# Patient Record
Sex: MALE | Race: WHITE | NOT HISPANIC OR LATINO | Employment: OTHER | URBAN - METROPOLITAN AREA
[De-identification: names, ages, dates, MRNs, and addresses within clinical notes are randomized per-mention and may not be internally consistent; named-entity substitution may affect disease eponyms.]

---

## 2018-03-13 ENCOUNTER — OFFICE VISIT CONVERTED (OUTPATIENT)
Dept: CARDIOLOGY | Facility: CLINIC | Age: 53
End: 2018-03-13
Attending: NURSE PRACTITIONER

## 2018-03-15 ENCOUNTER — OFFICE VISIT CONVERTED (OUTPATIENT)
Dept: SURGERY | Facility: CLINIC | Age: 53
End: 2018-03-15
Attending: UROLOGY

## 2018-10-05 ENCOUNTER — OFFICE VISIT CONVERTED (OUTPATIENT)
Dept: CARDIOLOGY | Facility: CLINIC | Age: 53
End: 2018-10-05
Attending: NURSE PRACTITIONER

## 2018-11-26 ENCOUNTER — OFFICE VISIT CONVERTED (OUTPATIENT)
Dept: SURGERY | Facility: CLINIC | Age: 53
End: 2018-11-26
Attending: UROLOGY

## 2018-12-17 ENCOUNTER — OFFICE VISIT CONVERTED (OUTPATIENT)
Dept: SURGERY | Facility: CLINIC | Age: 53
End: 2018-12-17
Attending: UROLOGY

## 2019-01-29 ENCOUNTER — OFFICE VISIT CONVERTED (OUTPATIENT)
Dept: PULMONOLOGY | Facility: CLINIC | Age: 54
End: 2019-01-29
Attending: INTERNAL MEDICINE

## 2019-03-12 ENCOUNTER — HOSPITAL ENCOUNTER (OUTPATIENT)
Dept: CARDIOLOGY | Facility: HOSPITAL | Age: 54
Discharge: HOME | End: 2019-03-12
Attending: INTERNAL MEDICINE

## 2019-03-27 ENCOUNTER — HOSPITAL ENCOUNTER (OUTPATIENT)
Dept: OTHER | Facility: HOSPITAL | Age: 54
Discharge: HOME | End: 2019-03-27
Attending: INTERNAL MEDICINE

## 2019-03-27 LAB
ALBUMIN SERPL-MCNC: 4.4 G/DL (ref 3.5–5)
ALBUMIN/GLOB SERPL: 1.4 {RATIO} (ref 1.4–2.6)
ALP SERPL-CCNC: 88 U/L (ref 56–119)
ALT SERPL-CCNC: 13 U/L (ref 10–40)
ANION GAP SERPL CALC-SCNC: 15 MMOL/L (ref 8–19)
AST SERPL-CCNC: 24 U/L (ref 15–50)
BILIRUB SERPL-MCNC: 0.21 MG/DL (ref 0.2–1.3)
BUN SERPL-MCNC: 7 MG/DL (ref 5–25)
BUN/CREAT SERPL: 8 {RATIO} (ref 6–20)
CALCIUM SERPL-MCNC: 9.7 MG/DL (ref 8.7–10.4)
CHLORIDE SERPL-SCNC: 99 MMOL/L (ref 99–111)
CHOLEST SERPL-MCNC: 116 MG/DL (ref 107–200)
CHOLEST/HDLC SERPL: 3.1 {RATIO} (ref 3–6)
CONV CO2: 27 MMOL/L (ref 22–32)
CONV TOTAL PROTEIN: 7.5 G/DL (ref 6.3–8.2)
CREAT UR-MCNC: 0.87 MG/DL (ref 0.7–1.2)
GFR SERPLBLD BASED ON 1.73 SQ M-ARVRAT: >60 ML/MIN/{1.73_M2}
GLOBULIN UR ELPH-MCNC: 3.1 G/DL (ref 2–3.5)
GLUCOSE SERPL-MCNC: 92 MG/DL (ref 70–99)
HDLC SERPL-MCNC: 37 MG/DL (ref 40–60)
LDLC SERPL CALC-MCNC: 57 MG/DL (ref 70–100)
OSMOLALITY SERPL CALC.SUM OF ELEC: 282 MOSM/KG (ref 273–304)
POTASSIUM SERPL-SCNC: 4.3 MMOL/L (ref 3.5–5.3)
SODIUM SERPL-SCNC: 137 MMOL/L (ref 135–147)
TRIGL SERPL-MCNC: 108 MG/DL (ref 40–150)
VLDLC SERPL-MCNC: 22 MG/DL (ref 5–37)

## 2019-04-05 ENCOUNTER — OFFICE VISIT CONVERTED (OUTPATIENT)
Dept: CARDIOLOGY | Facility: CLINIC | Age: 54
End: 2019-04-05
Attending: NURSE PRACTITIONER

## 2019-04-05 ENCOUNTER — CONVERSION ENCOUNTER (OUTPATIENT)
Dept: OTHER | Facility: HOSPITAL | Age: 54
End: 2019-04-05

## 2019-04-22 ENCOUNTER — CONVERSION ENCOUNTER (OUTPATIENT)
Dept: CARDIOLOGY | Facility: CLINIC | Age: 54
End: 2019-04-22
Attending: INTERNAL MEDICINE

## 2019-06-08 ENCOUNTER — HOSPITAL ENCOUNTER (OUTPATIENT)
Dept: ULTRASOUND IMAGING | Facility: HOSPITAL | Age: 54
Discharge: HOME | End: 2019-06-08

## 2019-06-20 ENCOUNTER — HOSPITAL ENCOUNTER (OUTPATIENT)
Dept: CT IMAGING | Facility: HOSPITAL | Age: 54
Discharge: HOME | End: 2019-06-20

## 2019-07-01 ENCOUNTER — OFFICE VISIT CONVERTED (OUTPATIENT)
Dept: PULMONOLOGY | Facility: CLINIC | Age: 54
End: 2019-07-01
Attending: PHYSICIAN ASSISTANT

## 2019-08-29 ENCOUNTER — HOSPITAL ENCOUNTER (OUTPATIENT)
Dept: FAMILY MEDICINE CLINIC | Facility: CLINIC | Age: 54
Discharge: HOME | End: 2019-08-29
Attending: FAMILY MEDICINE

## 2019-08-29 ENCOUNTER — OFFICE VISIT CONVERTED (OUTPATIENT)
Dept: FAMILY MEDICINE CLINIC | Facility: CLINIC | Age: 54
End: 2019-08-29
Attending: FAMILY MEDICINE

## 2019-08-29 LAB
ALBUMIN SERPL-MCNC: 4.3 G/DL (ref 3.5–5)
ALBUMIN/GLOB SERPL: 1.7 {RATIO} (ref 1.4–2.6)
ALP SERPL-CCNC: 74 U/L (ref 56–119)
ALT SERPL-CCNC: 25 U/L (ref 10–40)
ANION GAP SERPL CALC-SCNC: 16 MMOL/L (ref 8–19)
AST SERPL-CCNC: 24 U/L (ref 15–50)
BILIRUB SERPL-MCNC: 0.3 MG/DL (ref 0.2–1.3)
BUN SERPL-MCNC: 10 MG/DL (ref 5–25)
BUN/CREAT SERPL: 11 {RATIO} (ref 6–20)
CALCIUM SERPL-MCNC: 9 MG/DL (ref 8.7–10.4)
CHLORIDE SERPL-SCNC: 103 MMOL/L (ref 99–111)
CHOLEST SERPL-MCNC: 110 MG/DL (ref 107–200)
CHOLEST/HDLC SERPL: 3.2 {RATIO} (ref 3–6)
CONV CO2: 22 MMOL/L (ref 22–32)
CONV TOTAL PROTEIN: 6.8 G/DL (ref 6.3–8.2)
CREAT UR-MCNC: 0.94 MG/DL (ref 0.7–1.2)
GFR SERPLBLD BASED ON 1.73 SQ M-ARVRAT: >60 ML/MIN/{1.73_M2}
GLOBULIN UR ELPH-MCNC: 2.5 G/DL (ref 2–3.5)
GLUCOSE SERPL-MCNC: 77 MG/DL (ref 70–99)
HDLC SERPL-MCNC: 34 MG/DL (ref 40–60)
LDLC SERPL CALC-MCNC: 64 MG/DL (ref 70–100)
OSMOLALITY SERPL CALC.SUM OF ELEC: 282 MOSM/KG (ref 273–304)
POTASSIUM SERPL-SCNC: 4.2 MMOL/L (ref 3.5–5.3)
SODIUM SERPL-SCNC: 137 MMOL/L (ref 135–147)
TRIGL SERPL-MCNC: 58 MG/DL (ref 40–150)
VLDLC SERPL-MCNC: 12 MG/DL (ref 5–37)

## 2019-09-19 ENCOUNTER — CONVERSION ENCOUNTER (OUTPATIENT)
Dept: NEUROLOGY | Facility: CLINIC | Age: 54
End: 2019-09-19

## 2019-09-19 ENCOUNTER — OFFICE VISIT CONVERTED (OUTPATIENT)
Dept: NEUROSURGERY | Facility: CLINIC | Age: 54
End: 2019-09-19
Attending: PHYSICIAN ASSISTANT

## 2019-09-26 ENCOUNTER — HOSPITAL ENCOUNTER (OUTPATIENT)
Dept: OTHER | Facility: HOSPITAL | Age: 54
Discharge: HOME | End: 2019-09-26
Attending: NURSE PRACTITIONER

## 2019-09-26 LAB
ALBUMIN SERPL-MCNC: 4.6 G/DL (ref 3.5–5)
ALBUMIN/GLOB SERPL: 1.9 {RATIO} (ref 1.4–2.6)
ALP SERPL-CCNC: 75 U/L (ref 56–119)
ALT SERPL-CCNC: 26 U/L (ref 10–40)
ANION GAP SERPL CALC-SCNC: 14 MMOL/L (ref 8–19)
AST SERPL-CCNC: 19 U/L (ref 15–50)
BILIRUB SERPL-MCNC: 0.42 MG/DL (ref 0.2–1.3)
BUN SERPL-MCNC: 9 MG/DL (ref 5–25)
BUN/CREAT SERPL: 10 {RATIO} (ref 6–20)
CALCIUM SERPL-MCNC: 9.6 MG/DL (ref 8.7–10.4)
CHLORIDE SERPL-SCNC: 103 MMOL/L (ref 99–111)
CHOLEST SERPL-MCNC: 161 MG/DL (ref 107–200)
CHOLEST/HDLC SERPL: 3.7 {RATIO} (ref 3–6)
CONV CO2: 24 MMOL/L (ref 22–32)
CONV TOTAL PROTEIN: 7 G/DL (ref 6.3–8.2)
CREAT UR-MCNC: 0.88 MG/DL (ref 0.7–1.2)
GFR SERPLBLD BASED ON 1.73 SQ M-ARVRAT: >60 ML/MIN/{1.73_M2}
GLOBULIN UR ELPH-MCNC: 2.4 G/DL (ref 2–3.5)
GLUCOSE SERPL-MCNC: 95 MG/DL (ref 70–99)
HDLC SERPL-MCNC: 44 MG/DL (ref 40–60)
LDLC SERPL CALC-MCNC: 101 MG/DL (ref 70–100)
OSMOLALITY SERPL CALC.SUM OF ELEC: 282 MOSM/KG (ref 273–304)
POTASSIUM SERPL-SCNC: 4.3 MMOL/L (ref 3.5–5.3)
SODIUM SERPL-SCNC: 137 MMOL/L (ref 135–147)
TRIGL SERPL-MCNC: 78 MG/DL (ref 40–150)
VLDLC SERPL-MCNC: 16 MG/DL (ref 5–37)

## 2019-10-04 ENCOUNTER — OFFICE VISIT CONVERTED (OUTPATIENT)
Dept: CARDIOLOGY | Facility: CLINIC | Age: 54
End: 2019-10-04
Attending: NURSE PRACTITIONER

## 2019-10-18 ENCOUNTER — HOSPITAL ENCOUNTER (OUTPATIENT)
Dept: MRI IMAGING | Facility: HOSPITAL | Age: 54
Discharge: HOME | End: 2019-10-18
Attending: PHYSICIAN ASSISTANT

## 2019-10-31 ENCOUNTER — OFFICE VISIT CONVERTED (OUTPATIENT)
Dept: NEUROSURGERY | Facility: CLINIC | Age: 54
End: 2019-10-31
Attending: PHYSICIAN ASSISTANT

## 2019-10-31 ENCOUNTER — CONVERSION ENCOUNTER (OUTPATIENT)
Dept: NEUROLOGY | Facility: CLINIC | Age: 54
End: 2019-10-31

## 2019-11-07 ENCOUNTER — OFFICE VISIT CONVERTED (OUTPATIENT)
Dept: PULMONOLOGY | Facility: CLINIC | Age: 54
End: 2019-11-07
Attending: NURSE PRACTITIONER

## 2019-12-13 ENCOUNTER — HOSPITAL ENCOUNTER (OUTPATIENT)
Dept: FAMILY MEDICINE CLINIC | Facility: CLINIC | Age: 54
Discharge: HOME | End: 2019-12-13
Attending: FAMILY MEDICINE

## 2019-12-13 ENCOUNTER — OFFICE VISIT CONVERTED (OUTPATIENT)
Dept: FAMILY MEDICINE CLINIC | Facility: CLINIC | Age: 54
End: 2019-12-13
Attending: FAMILY MEDICINE

## 2019-12-13 LAB
ALBUMIN SERPL-MCNC: 4.1 G/DL (ref 3.5–5)
ALBUMIN/GLOB SERPL: 1.6 {RATIO} (ref 1.4–2.6)
ALP SERPL-CCNC: 64 U/L (ref 56–119)
ALT SERPL-CCNC: 21 U/L (ref 10–40)
ANION GAP SERPL CALC-SCNC: 15 MMOL/L (ref 8–19)
AST SERPL-CCNC: 24 U/L (ref 15–50)
BASOPHILS # BLD AUTO: 0.04 10*3/UL (ref 0–0.2)
BASOPHILS NFR BLD AUTO: 0.4 % (ref 0–3)
BILIRUB SERPL-MCNC: 0.25 MG/DL (ref 0.2–1.3)
BUN SERPL-MCNC: 6 MG/DL (ref 5–25)
BUN/CREAT SERPL: 6 {RATIO} (ref 6–20)
CALCIUM SERPL-MCNC: 9.3 MG/DL (ref 8.7–10.4)
CHLORIDE SERPL-SCNC: 103 MMOL/L (ref 99–111)
CHOLEST SERPL-MCNC: 103 MG/DL (ref 107–200)
CHOLEST/HDLC SERPL: 2.6 {RATIO} (ref 3–6)
CONV ABS IMM GRAN: 0.03 10*3/UL (ref 0–0.2)
CONV CO2: 26 MMOL/L (ref 22–32)
CONV IMMATURE GRAN: 0.3 % (ref 0–1.8)
CONV TOTAL PROTEIN: 6.6 G/DL (ref 6.3–8.2)
CREAT UR-MCNC: 1.02 MG/DL (ref 0.7–1.2)
DEPRECATED RDW RBC AUTO: 44.3 FL (ref 35.1–43.9)
EOSINOPHIL # BLD AUTO: 0.15 10*3/UL (ref 0–0.7)
EOSINOPHIL # BLD AUTO: 1.4 % (ref 0–7)
ERYTHROCYTE [DISTWIDTH] IN BLOOD BY AUTOMATED COUNT: 12.9 % (ref 11.6–14.4)
GFR SERPLBLD BASED ON 1.73 SQ M-ARVRAT: >60 ML/MIN/{1.73_M2}
GLOBULIN UR ELPH-MCNC: 2.5 G/DL (ref 2–3.5)
GLUCOSE SERPL-MCNC: 80 MG/DL (ref 70–99)
HCT VFR BLD AUTO: 41.3 % (ref 42–52)
HDLC SERPL-MCNC: 39 MG/DL (ref 40–60)
HGB BLD-MCNC: 13.6 G/DL (ref 14–18)
LDLC SERPL CALC-MCNC: 52 MG/DL (ref 70–100)
LYMPHOCYTES # BLD AUTO: 3.19 10*3/UL (ref 1–5)
LYMPHOCYTES NFR BLD AUTO: 30.4 % (ref 20–45)
MCH RBC QN AUTO: 30.8 PG (ref 27–31)
MCHC RBC AUTO-ENTMCNC: 32.9 G/DL (ref 33–37)
MCV RBC AUTO: 93.4 FL (ref 80–96)
MONOCYTES # BLD AUTO: 1.09 10*3/UL (ref 0.2–1.2)
MONOCYTES NFR BLD AUTO: 10.4 % (ref 3–10)
NEUTROPHILS # BLD AUTO: 5.98 10*3/UL (ref 2–8)
NEUTROPHILS NFR BLD AUTO: 57.1 % (ref 30–85)
NRBC CBCN: 0 % (ref 0–0.7)
OSMOLALITY SERPL CALC.SUM OF ELEC: 287 MOSM/KG (ref 273–304)
PLATELET # BLD AUTO: 216 10*3/UL (ref 130–400)
PMV BLD AUTO: 12.6 FL (ref 9.4–12.4)
POTASSIUM SERPL-SCNC: 4.1 MMOL/L (ref 3.5–5.3)
RBC # BLD AUTO: 4.42 10*6/UL (ref 4.7–6.1)
SODIUM SERPL-SCNC: 140 MMOL/L (ref 135–147)
T4 FREE SERPL-MCNC: 0.9 NG/DL (ref 0.9–1.8)
TRIGL SERPL-MCNC: 60 MG/DL (ref 40–150)
TSH SERPL-ACNC: 1.63 M[IU]/L (ref 0.27–4.2)
VLDLC SERPL-MCNC: 12 MG/DL (ref 5–37)
WBC # BLD AUTO: 10.48 10*3/UL (ref 4.8–10.8)

## 2019-12-16 ENCOUNTER — HOSPITAL ENCOUNTER (OUTPATIENT)
Dept: FAMILY MEDICINE CLINIC | Facility: CLINIC | Age: 54
Discharge: HOME | End: 2019-12-16
Attending: FAMILY MEDICINE

## 2019-12-16 LAB
IRON SATN MFR SERPL: 23 % (ref 20–55)
IRON SATN MFR SERPL: 28 % (ref 20–55)
IRON SERPL-MCNC: 105 UG/DL (ref 70–180)
IRON SERPL-MCNC: 91 UG/DL (ref 70–180)
TIBC SERPL-MCNC: 376 UG/DL (ref 245–450)
TIBC SERPL-MCNC: 388 UG/DL (ref 245–450)
TRANSFERRIN SERPL-MCNC: 263 MG/DL (ref 215–365)
TRANSFERRIN SERPL-MCNC: 271 MG/DL (ref 215–365)

## 2019-12-20 ENCOUNTER — HOSPITAL ENCOUNTER (OUTPATIENT)
Dept: CT IMAGING | Facility: HOSPITAL | Age: 54
Discharge: HOME | End: 2019-12-20

## 2020-01-24 ENCOUNTER — OFFICE VISIT CONVERTED (OUTPATIENT)
Dept: PULMONOLOGY | Facility: CLINIC | Age: 55
End: 2020-01-24
Attending: NURSE PRACTITIONER

## 2020-01-29 ENCOUNTER — HOSPITAL ENCOUNTER (OUTPATIENT)
Dept: CT IMAGING | Facility: HOSPITAL | Age: 55
Discharge: HOME | End: 2020-01-29
Attending: NURSE PRACTITIONER

## 2020-01-29 LAB
CREAT BLD-MCNC: 0.9 MG/DL (ref 0.6–1.4)
GFR SERPLBLD BASED ON 1.73 SQ M-ARVRAT: >60 ML/MIN/{1.73_M2}

## 2020-03-02 ENCOUNTER — HOSPITAL ENCOUNTER (OUTPATIENT)
Dept: FAMILY MEDICINE CLINIC | Facility: CLINIC | Age: 55
Discharge: HOME | End: 2020-03-02
Attending: FAMILY MEDICINE

## 2020-03-02 ENCOUNTER — OFFICE VISIT CONVERTED (OUTPATIENT)
Dept: FAMILY MEDICINE CLINIC | Facility: CLINIC | Age: 55
End: 2020-03-02
Attending: FAMILY MEDICINE

## 2020-03-02 ENCOUNTER — CONVERSION ENCOUNTER (OUTPATIENT)
Dept: FAMILY MEDICINE CLINIC | Facility: CLINIC | Age: 55
End: 2020-03-02

## 2020-03-02 LAB
ALBUMIN SERPL-MCNC: 4.1 G/DL (ref 3.5–5)
ALBUMIN/GLOB SERPL: 1.6 {RATIO} (ref 1.4–2.6)
ALP SERPL-CCNC: 70 U/L (ref 56–119)
ALT SERPL-CCNC: 13 U/L (ref 10–40)
ANION GAP SERPL CALC-SCNC: 22 MMOL/L (ref 8–19)
AST SERPL-CCNC: 18 U/L (ref 15–50)
BASOPHILS # BLD AUTO: 0.05 10*3/UL (ref 0–0.2)
BASOPHILS NFR BLD AUTO: 0.5 % (ref 0–3)
BILIRUB SERPL-MCNC: 0.16 MG/DL (ref 0.2–1.3)
BUN SERPL-MCNC: 6 MG/DL (ref 5–25)
BUN/CREAT SERPL: 6 {RATIO} (ref 6–20)
CALCIUM SERPL-MCNC: 9.4 MG/DL (ref 8.7–10.4)
CHLORIDE SERPL-SCNC: 105 MMOL/L (ref 99–111)
CHOLEST SERPL-MCNC: 101 MG/DL (ref 107–200)
CHOLEST/HDLC SERPL: 3.2 {RATIO} (ref 3–6)
CONV ABS IMM GRAN: 0.04 10*3/UL (ref 0–0.2)
CONV CO2: 20 MMOL/L (ref 22–32)
CONV IMMATURE GRAN: 0.4 % (ref 0–1.8)
CONV TOTAL PROTEIN: 6.7 G/DL (ref 6.3–8.2)
CREAT UR-MCNC: 0.96 MG/DL (ref 0.7–1.2)
DEPRECATED RDW RBC AUTO: 44.9 FL (ref 35.1–43.9)
EOSINOPHIL # BLD AUTO: 0.19 10*3/UL (ref 0–0.7)
EOSINOPHIL # BLD AUTO: 1.9 % (ref 0–7)
ERYTHROCYTE [DISTWIDTH] IN BLOOD BY AUTOMATED COUNT: 13.2 % (ref 11.6–14.4)
GFR SERPLBLD BASED ON 1.73 SQ M-ARVRAT: >60 ML/MIN/{1.73_M2}
GLOBULIN UR ELPH-MCNC: 2.6 G/DL (ref 2–3.5)
GLUCOSE SERPL-MCNC: 83 MG/DL (ref 70–99)
HCT VFR BLD AUTO: 42.9 % (ref 42–52)
HDLC SERPL-MCNC: 32 MG/DL (ref 40–60)
HGB BLD-MCNC: 13.7 G/DL (ref 14–18)
LDLC SERPL CALC-MCNC: 59 MG/DL (ref 70–100)
LYMPHOCYTES # BLD AUTO: 2.26 10*3/UL (ref 1–5)
LYMPHOCYTES NFR BLD AUTO: 23 % (ref 20–45)
MCH RBC QN AUTO: 29.7 PG (ref 27–31)
MCHC RBC AUTO-ENTMCNC: 31.9 G/DL (ref 33–37)
MCV RBC AUTO: 93.1 FL (ref 80–96)
MONOCYTES # BLD AUTO: 0.87 10*3/UL (ref 0.2–1.2)
MONOCYTES NFR BLD AUTO: 8.8 % (ref 3–10)
NEUTROPHILS # BLD AUTO: 6.43 10*3/UL (ref 2–8)
NEUTROPHILS NFR BLD AUTO: 65.4 % (ref 30–85)
NRBC CBCN: 0 % (ref 0–0.7)
OSMOLALITY SERPL CALC.SUM OF ELEC: 293 MOSM/KG (ref 273–304)
PLATELET # BLD AUTO: 261 10*3/UL (ref 130–400)
PMV BLD AUTO: 12.7 FL (ref 9.4–12.4)
POTASSIUM SERPL-SCNC: 4.3 MMOL/L (ref 3.5–5.3)
RBC # BLD AUTO: 4.61 10*6/UL (ref 4.7–6.1)
SODIUM SERPL-SCNC: 143 MMOL/L (ref 135–147)
TRIGL SERPL-MCNC: 49 MG/DL (ref 40–150)
VLDLC SERPL-MCNC: 10 MG/DL (ref 5–37)
WBC # BLD AUTO: 9.84 10*3/UL (ref 4.8–10.8)

## 2020-03-17 ENCOUNTER — OFFICE VISIT CONVERTED (OUTPATIENT)
Dept: GASTROENTEROLOGY | Facility: CLINIC | Age: 55
End: 2020-03-17
Attending: NURSE PRACTITIONER

## 2020-04-17 ENCOUNTER — TELEMEDICINE CONVERTED (OUTPATIENT)
Dept: CARDIOLOGY | Facility: CLINIC | Age: 55
End: 2020-04-17
Attending: NURSE PRACTITIONER

## 2020-05-14 ENCOUNTER — OFFICE VISIT CONVERTED (OUTPATIENT)
Dept: PULMONOLOGY | Facility: CLINIC | Age: 55
End: 2020-05-14
Attending: INTERNAL MEDICINE

## 2020-08-18 ENCOUNTER — HOSPITAL ENCOUNTER (OUTPATIENT)
Dept: GENERAL RADIOLOGY | Facility: HOSPITAL | Age: 55
Discharge: HOME | End: 2020-08-18
Attending: NURSE PRACTITIONER

## 2020-11-17 ENCOUNTER — TELEMEDICINE CONVERTED (OUTPATIENT)
Dept: CARDIOLOGY | Facility: CLINIC | Age: 55
End: 2020-11-17
Attending: NURSE PRACTITIONER

## 2020-11-27 ENCOUNTER — HOSPITAL ENCOUNTER (OUTPATIENT)
Dept: URGENT CARE | Facility: CLINIC | Age: 55
Discharge: HOME | End: 2020-11-27

## 2020-11-27 LAB — SARS-COV-2 RNA SPEC QL NAA+PROBE: NOT DETECTED

## 2020-12-08 ENCOUNTER — HOSPITAL ENCOUNTER (OUTPATIENT)
Dept: URGENT CARE | Facility: CLINIC | Age: 55
Discharge: HOME | End: 2020-12-08
Attending: FAMILY MEDICINE

## 2020-12-16 ENCOUNTER — OFFICE VISIT CONVERTED (OUTPATIENT)
Dept: ONCOLOGY | Facility: HOSPITAL | Age: 55
End: 2020-12-16
Attending: NURSE PRACTITIONER

## 2020-12-24 ENCOUNTER — OFFICE VISIT CONVERTED (OUTPATIENT)
Dept: ONCOLOGY | Facility: HOSPITAL | Age: 55
End: 2020-12-24
Attending: NURSE PRACTITIONER

## 2021-01-20 ENCOUNTER — HOSPITAL ENCOUNTER (OUTPATIENT)
Dept: GENERAL RADIOLOGY | Facility: HOSPITAL | Age: 56
Discharge: HOME | End: 2021-01-20
Attending: NURSE PRACTITIONER

## 2021-01-21 ENCOUNTER — OFFICE VISIT CONVERTED (OUTPATIENT)
Dept: ONCOLOGY | Facility: HOSPITAL | Age: 56
End: 2021-01-21
Attending: NURSE PRACTITIONER

## 2021-01-25 ENCOUNTER — HOSPITAL ENCOUNTER (OUTPATIENT)
Dept: PET IMAGING | Facility: HOSPITAL | Age: 56
Discharge: HOME | End: 2021-01-25
Attending: NURSE PRACTITIONER

## 2021-01-25 ENCOUNTER — HOSPITAL ENCOUNTER (OUTPATIENT)
Dept: PREADMISSION TESTING | Facility: HOSPITAL | Age: 56
Discharge: HOME | End: 2021-01-25
Attending: INTERNAL MEDICINE

## 2021-01-25 LAB — SARS-COV-2 RNA SPEC QL NAA+PROBE: NOT DETECTED

## 2021-01-28 ENCOUNTER — HOSPITAL ENCOUNTER (OUTPATIENT)
Dept: CARDIOLOGY | Facility: HOSPITAL | Age: 56
Discharge: HOME | End: 2021-01-28
Attending: INTERNAL MEDICINE

## 2021-01-28 ENCOUNTER — OFFICE VISIT CONVERTED (OUTPATIENT)
Dept: ONCOLOGY | Facility: HOSPITAL | Age: 56
End: 2021-01-28
Attending: INTERNAL MEDICINE

## 2021-01-29 ENCOUNTER — HOSPITAL ENCOUNTER (OUTPATIENT)
Dept: GASTROENTEROLOGY | Facility: HOSPITAL | Age: 56
Setting detail: HOSPITAL OUTPATIENT SURGERY
Discharge: HOME | End: 2021-01-29
Attending: INTERNAL MEDICINE

## 2021-01-29 LAB
EPI CELLS NFR FLD: 20 %
LYMPHOCYTES NFR FLD MANUAL: 10 %
MACROPHAGE FLUID: 10 /100{WBCS}
NEUTROPHILS NFR FLD MANUAL: 60 %
VISUAL PRESENCE OF BLOOD: NORMAL

## 2021-02-01 LAB
AMPICILLIN SUSC ISLT: <=0.25
BACTERIA SPEC AEROBE CULT: ABNORMAL
BACTERIA SPEC AEROBE CULT: ABNORMAL
CEFOTAXIME SUSC ISLT.MENING: <=0.12
CEFOTAXIME SUSC ISLT: <=0.12
CEFOTAXIME SUSC ISLT: <=0.12
CEFTRIAXONE SUSC ISLT.MENING: <=0.12
CEFTRIAXONE SUSC ISLT: <=0.12
CEFTRIAXONE SUSC ISLT: <=0.12
CLINDAMYCIN SUSC ISLT: <=0.25
CLINDAMYCIN SUSC ISLT: <=0.25
CONV BRONCHIAL WASH CULTURE: ABNORMAL
CONV NOCARDIA STAIN (PARTIAL,MODIFIED ACID FAST): NORMAL
ERYTHROMYCIN SUSC ISLT: <=0.12
LEVOFLOXACIN SUSC ISLT: 0.5
LEVOFLOXACIN SUSC ISLT: 1
PENICILLIN G SUSC ISLT: 0.12
TETRACYCLINE SUSC ISLT: <=0.25
TETRACYCLINE SUSC ISLT: <=0.25
TIGECYCLINE SUSC ISLT: <=0.06
TIGECYCLINE SUSC ISLT: <=0.06
TMP SMX SUSC ISLT: <=10
VANCOMYCIN SUSC ISLT: 0.5
VANCOMYCIN SUSC ISLT: 0.5

## 2021-02-04 ENCOUNTER — OFFICE VISIT CONVERTED (OUTPATIENT)
Dept: ONCOLOGY | Facility: HOSPITAL | Age: 56
End: 2021-02-04
Attending: NURSE PRACTITIONER

## 2021-02-05 ENCOUNTER — HOSPITAL ENCOUNTER (OUTPATIENT)
Dept: MRI IMAGING | Facility: HOSPITAL | Age: 56
Discharge: HOME | End: 2021-02-05
Attending: NURSE PRACTITIONER

## 2021-02-08 LAB — CONV LEGIONELLA CULTURE: NORMAL

## 2021-02-09 ENCOUNTER — OFFICE VISIT CONVERTED (OUTPATIENT)
Dept: OTOLARYNGOLOGY | Facility: CLINIC | Age: 56
End: 2021-02-09
Attending: OTOLARYNGOLOGY

## 2021-02-11 ENCOUNTER — OFFICE VISIT CONVERTED (OUTPATIENT)
Dept: ONCOLOGY | Facility: HOSPITAL | Age: 56
End: 2021-02-11
Attending: INTERNAL MEDICINE

## 2021-02-11 ENCOUNTER — HOSPITAL ENCOUNTER (OUTPATIENT)
Dept: ONCOLOGY | Facility: HOSPITAL | Age: 56
Discharge: HOME | End: 2021-02-11
Attending: INTERNAL MEDICINE

## 2021-02-16 ENCOUNTER — OFFICE VISIT CONVERTED (OUTPATIENT)
Dept: ONCOLOGY | Facility: HOSPITAL | Age: 56
End: 2021-02-16
Attending: THORACIC SURGERY (CARDIOTHORACIC VASCULAR SURGERY)

## 2021-03-22 ENCOUNTER — HOSPITAL ENCOUNTER (OUTPATIENT)
Dept: PREADMISSION TESTING | Facility: HOSPITAL | Age: 56
Discharge: HOME | End: 2021-03-22
Attending: THORACIC SURGERY (CARDIOTHORACIC VASCULAR SURGERY)

## 2021-03-22 LAB — SARS-COV-2 RNA SPEC QL NAA+PROBE: NOT DETECTED

## 2021-04-06 ENCOUNTER — HOSPITAL ENCOUNTER (OUTPATIENT)
Dept: GENERAL RADIOLOGY | Facility: HOSPITAL | Age: 56
Discharge: HOME | End: 2021-04-06
Attending: THORACIC SURGERY (CARDIOTHORACIC VASCULAR SURGERY)

## 2021-04-06 ENCOUNTER — HOSPITAL ENCOUNTER (OUTPATIENT)
Dept: ONCOLOGY | Facility: HOSPITAL | Age: 56
Discharge: HOME | End: 2021-04-06
Attending: THORACIC SURGERY (CARDIOTHORACIC VASCULAR SURGERY)

## 2021-04-06 ENCOUNTER — OFFICE VISIT CONVERTED (OUTPATIENT)
Dept: ONCOLOGY | Facility: HOSPITAL | Age: 56
End: 2021-04-06
Attending: THORACIC SURGERY (CARDIOTHORACIC VASCULAR SURGERY)

## 2021-04-13 ENCOUNTER — HOSPITAL ENCOUNTER (OUTPATIENT)
Dept: FAMILY MEDICINE CLINIC | Facility: CLINIC | Age: 56
Discharge: HOME | End: 2021-04-13
Attending: FAMILY MEDICINE

## 2021-04-13 ENCOUNTER — HOSPITAL ENCOUNTER (OUTPATIENT)
Dept: VACCINE CLINIC | Facility: HOSPITAL | Age: 56
Discharge: HOME | End: 2021-04-13
Attending: INTERNAL MEDICINE

## 2021-04-13 ENCOUNTER — OFFICE VISIT CONVERTED (OUTPATIENT)
Dept: FAMILY MEDICINE CLINIC | Facility: CLINIC | Age: 56
End: 2021-04-13
Attending: FAMILY MEDICINE

## 2021-04-13 LAB
ALBUMIN SERPL-MCNC: 3.6 G/DL (ref 3.5–5)
ALBUMIN/GLOB SERPL: 1 {RATIO} (ref 1.4–2.6)
ALP SERPL-CCNC: 96 U/L (ref 56–119)
ALT SERPL-CCNC: 9 U/L (ref 10–40)
ANION GAP SERPL CALC-SCNC: 17 MMOL/L (ref 8–19)
AST SERPL-CCNC: 14 U/L (ref 15–50)
BASOPHILS # BLD AUTO: 0.09 10*3/UL (ref 0–0.2)
BASOPHILS NFR BLD AUTO: 0.7 % (ref 0–3)
BILIRUB SERPL-MCNC: <0.15 MG/DL (ref 0.2–1.3)
BUN SERPL-MCNC: 13 MG/DL (ref 5–25)
BUN/CREAT SERPL: 19 {RATIO} (ref 6–20)
CALCIUM SERPL-MCNC: 9.9 MG/DL (ref 8.7–10.4)
CHLORIDE SERPL-SCNC: 104 MMOL/L (ref 99–111)
CHOLEST SERPL-MCNC: 143 MG/DL (ref 107–200)
CHOLEST/HDLC SERPL: 3.1 {RATIO} (ref 3–6)
CONV ABS IMM GRAN: 0.06 10*3/UL (ref 0–0.2)
CONV CO2: 22 MMOL/L (ref 22–32)
CONV IMMATURE GRAN: 0.5 % (ref 0–1.8)
CONV TOTAL PROTEIN: 7.2 G/DL (ref 6.3–8.2)
CREAT UR-MCNC: 0.7 MG/DL (ref 0.7–1.2)
DEPRECATED RDW RBC AUTO: 57.3 FL (ref 35.1–43.9)
EOSINOPHIL # BLD AUTO: 0.27 10*3/UL (ref 0–0.7)
EOSINOPHIL # BLD AUTO: 2.1 % (ref 0–7)
ERYTHROCYTE [DISTWIDTH] IN BLOOD BY AUTOMATED COUNT: 17.2 % (ref 11.6–14.4)
GFR SERPLBLD BASED ON 1.73 SQ M-ARVRAT: >60 ML/MIN/{1.73_M2}
GLOBULIN UR ELPH-MCNC: 3.6 G/DL (ref 2–3.5)
GLUCOSE SERPL-MCNC: 81 MG/DL (ref 70–99)
HCT VFR BLD AUTO: 35.5 % (ref 42–52)
HDLC SERPL-MCNC: 46 MG/DL (ref 40–60)
HGB BLD-MCNC: 10.7 G/DL (ref 14–18)
LDLC SERPL CALC-MCNC: 85 MG/DL (ref 70–100)
LYMPHOCYTES # BLD AUTO: 2.56 10*3/UL (ref 1–5)
LYMPHOCYTES NFR BLD AUTO: 19.7 % (ref 20–45)
MCH RBC QN AUTO: 27.7 PG (ref 27–31)
MCHC RBC AUTO-ENTMCNC: 30.1 G/DL (ref 33–37)
MCV RBC AUTO: 92 FL (ref 80–96)
MONOCYTES # BLD AUTO: 1.01 10*3/UL (ref 0.2–1.2)
MONOCYTES NFR BLD AUTO: 7.8 % (ref 3–10)
NEUTROPHILS # BLD AUTO: 9.03 10*3/UL (ref 2–8)
NEUTROPHILS NFR BLD AUTO: 69.2 % (ref 30–85)
NRBC CBCN: 0 % (ref 0–0.7)
OSMOLALITY SERPL CALC.SUM OF ELEC: 285 MOSM/KG (ref 273–304)
PLATELET # BLD AUTO: 650 10*3/UL (ref 130–400)
PMV BLD AUTO: 9.9 FL (ref 9.4–12.4)
POTASSIUM SERPL-SCNC: 4.7 MMOL/L (ref 3.5–5.3)
RBC # BLD AUTO: 3.86 10*6/UL (ref 4.7–6.1)
SODIUM SERPL-SCNC: 138 MMOL/L (ref 135–147)
TRIGL SERPL-MCNC: 60 MG/DL (ref 40–150)
VLDLC SERPL-MCNC: 12 MG/DL (ref 5–37)
WBC # BLD AUTO: 13.02 10*3/UL (ref 4.8–10.8)

## 2021-04-20 ENCOUNTER — OFFICE VISIT CONVERTED (OUTPATIENT)
Dept: ONCOLOGY | Facility: HOSPITAL | Age: 56
End: 2021-04-20
Attending: THORACIC SURGERY (CARDIOTHORACIC VASCULAR SURGERY)

## 2021-04-20 ENCOUNTER — HOSPITAL ENCOUNTER (OUTPATIENT)
Dept: ONCOLOGY | Facility: HOSPITAL | Age: 56
Discharge: HOME | End: 2021-04-20
Attending: INTERNAL MEDICINE

## 2021-05-04 ENCOUNTER — HOSPITAL ENCOUNTER (OUTPATIENT)
Dept: VACCINE CLINIC | Facility: HOSPITAL | Age: 56
Discharge: HOME | End: 2021-05-04
Attending: INTERNAL MEDICINE

## 2021-05-10 NOTE — H&P
History and Physical      Patient Name: Hollis Haji   Patient ID: [id number]   Sex: Male   YOB: 1965    Primary Care Provider: SETH AVERY   Referring Provider: Jannet AVERY    Visit Date: February 9, 2021    Provider: Rodo Bermudez MD   Location: [facility]   Location Address: [address] [place] [address], [address]  [id number]   Location Phone: [phone]          Chief Complaint     1.  PET scan abnormality    2.  Hypermetabolism within the oral cavity       History Of Present Illness  Hollis Haji is a 55 year old /White male who presents to the office today as a consult from Jannet AVERY.      He presents the clinic today for evaluation of an incidental finding of hypermetabolism within the oral cavity seen on a recent PET scan performed for evaluation of a lobulated mass in the right hilum of the lungs which appears to be a malignancy.  The patient is a longtime smoker, and continues to smoke about 1 pack/day.  He was previously smoking 2 packs/day or more.  He has had weight loss.  He is not currently having any issues with oral cavity pain, notes no history of oral lesions.  He has had no symptoms in the area that was hypermetabolic on scan.  The radiologist did mention the possibility of muscular activity as the cause of the hypermetabolism.  He is currently getting work-up and is due to begin treatment for his lung cancer.       Past Medical History  Anxiety; Arthritis; Bipolar 1 disorder, mixed, mild; Cervical spinal stenosis; Cervical spine pain; COPD (chronic obstructive pulmonary disease); Coronary artery disease; Depression; Essential hypertension; Heart Attack (MI); Herniated disc, C5-6; Hyperlipemia; PTSD (post-traumatic stress disorder); Tobacco abuse; Tobacco abuse counseling         Past Surgical History  Arm Surgery; Colonoscopy; Direct revascularization cardiac with catheter stent, prosthesis, or vein  graft; skin graft         Medication List  albuterol sulfate 90 mcg/actuation inhalation HFA aerosol inhaler; Aspir-81 81 mg oral tablet,delayed release (DR/EC); budesonide-formoterol 160-4.5 mcg/actuation inhalation HFA aerosol inhaler; hydrocodone-acetaminophen 7.5-325 mg oral tablet; hydroxyzine HCl 50 mg oral tablet; lisinopril 10 mg oral tablet; metoprolol succinate 25 mg oral tablet extended release 24 hr; Nitrostat 0.4 mg sublingual tablet, sublingual; omeprazole 40 mg oral capsule,delayed release(DR/EC); prazosin 2 mg oral capsule; ProAir HFA 90 mcg/actuation inhalation HFA aerosol inhaler; quetiapine 300 mg oral tablet; rosuvastatin 40 mg oral tablet; Symbicort 160-4.5 mcg/actuation inhalation HFA aerosol inhaler; Ventolin HFA 90 mcg/actuation inhalation HFA aerosol inhaler         Allergy List  NO KNOWN DRUG ALLERGIES         Family Medical History  Adopted - no noted history; Unobtainable family history due to adoption         Social History  Alcohol (Never); lives with spouse; ; Tobacco (Current every day); Unemployed         Immunizations  Name Date Admin   Influenza 09/30/2019   Influenza 10/30/2018   [id number] 01/08/2018         Review of Systems  · Constitutional  o Admits  o : night sweats  o Denies  o : fever, weight loss  · Eyes  o Denies  o : discharge from eye, impaired vision  · HENT  o Admits  o : *See HPI  · Cardiovascular  o Denies  o : chest pain, irregular heart beats  · Respiratory  o Admits  o : shortness of breath  o Denies  o : wheezing, coughing up blood  · Gastrointestinal  o Admits  o : heartburn  o Denies  o : reflux, vomiting blood  · Genitourinary  o Admits  o : frequency  · Integument  o Denies  o : rash, skin dryness  · Neurologic  o Denies  o : seizures, loss of balance, loss of consciousness, dizziness  · Endocrine  o Denies  o : cold intolerance, heat intolerance  · Heme-Lymph  o Denies  o : easy bleeding, anemia      Vitals  Date Time BP Position Site L\R Cuff Size  HR RR TEMP (F) WT  HT  BMI kg/m2 BSA m2 O2 Sat FR L/min FiO2 HC       02/09/2021 02:59 PM        96.9 181lbs 6oz 6'   24.6 2.04             Physical Examination  · Constitutional  o Appearance  o : well developed, well-nourished, alert and in no acute distress, voice clear and strong  · Head and Face  o Head  o :   § Inspection  § : no deformities or lesions  o Face  o :   § Inspection  § : No facial lesions; House-Brackmann I/VI bilaterally  § Palpation  § : No TMJ crepitus nor  muscle tenderness bilaterally  · Eyes  o Vision  o :   § Visual Fields  § : Extraocular movements are intact. No spontaneous or gaze-induced nystagmus.  o Conjunctivae  o : clear  o Sclerae  o : clear  o Pupils and Irises  o : pupils equal, round, and reactive to light.   · Ears, Nose, Mouth and Throat  o Ears  o :   § External Ears  § : appearance within normal limits, no lesions present  § Otoscopic Examination  § : tympanic membrane appearance within normal limits bilaterally without perforations, well-aerated middle ears  § Hearing  § : intact to conversational voice both ears  o Nose  o :   § External Nose  § : appearance normal  § Intranasal Exam  § : mucosa within normal limits, vestibules normal, no intranasal lesions present, septum midline, sinuses non tender to percussion  o Oral Cavity  o :   § Oral Mucosa  § : oral mucosa normal without pallor or cyanosis  § Lips  § : lip appearance normal  § Teeth  § : Edentulous  § Gums  § : gums pink, non-swollen, no bleeding present  § Tongue  § : tongue appearance normal; normal mobility  § Palate  § : hard palate normal, soft palate appearance normal with symmetric mobility  o Throat  o :   § Oropharynx  § : no inflammation or lesions present, tonsils within normal limits  § Hypopharynx  § : appearance within normal limits, superior epiglottis within normal limits  § Larynx  § : appearance within normal limits, vocal cords within normal limits, no lesions  present  · Neck  o Inspection/Palpation  o : normal appearance, no masses or tenderness, trachea midline; thyroid size normal, nontender, no nodules or masses present on palpation  · Respiratory  o Respiratory Effort  o : breathing unlabored  o Inspection of Chest  o : normal appearance, no retractions  · Cardiovascular  o Heart  o : regular rate and rhythm  · Lymphatic  o Neck  o : no lymphadenopathy present  o Supraclavicular Nodes  o : no lymphadenopathy present  o Preauricular Nodes  o : no lymphadenopathy present  · Skin and Subcutaneous Tissue  o General Inspection  o : Regarding face and neck - there are no rashes present, no lesions present, and no areas of discoloration  · Neurologic  o Cranial Nerves  o : cranial nerves II-XII are grossly intact bilaterally  o Gait and Station  o : normal gait, able to stand without diffculty  · Psychiatric  o Judgement and Insight  o : judgment and insight intact  o Mood and Affect  o : mood normal, affect appropriate          Assessment  · Oral lesion     528.9/K13.70      Plan  · Medications  o Medications have been Reconciled  o Transition of Care or Provider Policy  · Instructions  o He presents the clinic today for evaluation of an incidental finding of hypermetabolism within the oral cavity seen on a recent PET scan performed for evaluation of a lobulated mass in the right hilum of the lungs which appears to be a malignancy. The patient is a longtime smoker, and continues to smoke about 1 pack/day. He was previously smoking 2 packs/day or more. He has had weight loss. He is not currently having any issues with oral cavity pain, notes no history of oral lesions. He has had no symptoms in the area that was hypermetabolic on scan. The radiologist did mention the possibility of muscular activity as the cause of the hypermetabolism. He is currently getting work-up and is due to begin treatment for his lung cancer. Exam of the oral cavity revealed no mucosal lesions or  palpable abnormality. I think that it is likely that the hypermetabolism was due to muscle activity and movement. I discussed this with the patient, and of asked him to contact me should there be any changes so that I may reevaluate him. We had a lengthy discussion about the importance of not smoking and he will make attempts to cut down on his smoking with the goal to quit.  o Electronically Identified Patient Education Materials Provided Electronically  · Correspondence  o ENT Letter to Referring MD (Jannet AVERY) - 02/12/2021            Electronically Signed by: Rodo Bermudez MD -Author on February 12, 2021 01:22:09 PM

## 2021-05-12 NOTE — PROGRESS NOTES
Quick Note      Patient Name: Hollis Haji   Patient ID: [id number]   Sex: Male   YOB: 1965    Primary Care Provider: MARTIN BARBOUR PA-C   Referring Provider: Eugeino Cedeño DO    Visit Date: April 17, 2020    Provider: BENNIE Dupree   Location: [facility]   Location Address: [address] [place] [address], [facility]   [place], [place]  [id number]   Location Phone: [phone]          History Of Present Illness  TELEHEALTH TELEPHONE VISIT  Chief Complaint: Shortness of breath.   Hollis Haji is a 55 year old /White male who is presenting for evaluation via telehealth telephone visit due to Covid-19. Verbal consent obtained before beginning visit. The patient continues to be shortness of breath. He said it is unchanged from his last visit. He continues to smoke 2 packs a day with no desire to stop. Denies any chest pain or pressure. No palpitations, swelling, dizziness, syncope, PND, or orthopnea. He does not get any regular exercise, and if his scales are aligned with ours, he has gained a couple of pounds since his last visit.   Provider spent 5 minutes with the patient during telehealth visit.   The following staff were present during this visit: Provider only.   Past Medical History/Overview of Patient Symptoms     PAST MEDICAL HISTORY:  Coronary artery disease with previous stent to the RCA; hypertension; hyperlipidemia; nicotine dependence.      FAMILY HISTORY:  Positive for heart disease.  Negative for diabetes and hypertension.      PSYCHOSOCIAL HISTORY:  He does not drink alcohol.  He smokes 2 packs per day.      CURRENT MEDICATIONS:  Rosuvastatin 40 mg daily; aspirin 81 mg daily; Hydrocodone t.i.d.; Lisinopril 10 mg daily; Omeprazole 40 mg daily; Prazosin 2  mg daily; Seroquel 300 mg daily; Toprol 25 mg 1/2 mg b.i.d.; Symbicort; Albuterol; Spiriva. The dosage and frequency of the medications were reviewed with the patient.     REVIEW OF SYSTEMS:    Cardiac:  Admits shortness of breath. Denies chest pain, palpitations, swelling.  Respiratory:  Denies chronic or frequent cough, asthma or wheezing.      LABS:  In March, blood sugar 83, triglycerides 49, total cholesterol 101, HDL 32, LDL 59.           Assessment     1.  Coronary artery disease with previous stent without angina.   2.  Hypertension, unknown control.  3.  Hyperlipidemia, with LDL at goal.  4.  Nicotine dependence.       Plan     1.  Smoking cessation counseling was performed, but he declines any aids and has no desire to stop.   2.  Will continue current medication.   3.  Follow up in six months or earlier if needed.       BENNIE Grijalva    This note was transcribed by Kristal Everett. [email or web address]  The above service was transcribed by Kristal Everett, and I attest to the accuracy of the note.  JF/pap             Electronically Signed by: Perlita Everett-, Other -Author on April 23, 2020 01:48:23 PM  Electronically Co-signed by: BENNIE Dupree -Reviewer on April 30, 2020 07:23:58 AM

## 2021-05-13 NOTE — PROGRESS NOTES
Progress Note      Patient Name: Hollis Haji   Patient ID: [id number]   Sex: Male   YOB: 1965    Primary Care Provider: Eugenio Cedeño DO   Referring Provider: Jannet AVERY    Visit Date: November 17, 2020    Provider: BENNIE Dupree   Location: [facility]   Location Address: [address] [place] [address], [address]   [place]  [id number]   Location Phone: [phone]          Chief Complaint     Shortness of breath.       History Of Present Illness  Video Conferencing Visit  Hollis Haji is a 55 year old /White male who is presenting for evaluation via video conferencing. Verbal consent obtained before beginning visit. Mr. Haji who continues to be short of breath. It is mild with moderate exertion and that is long-term and normal for him. He denies any chest pain, palpitations, swelling, dizziness, syncope, PND, or orthopnea. Cardiac-wise, he is feeling very well. He continues to smoke at least 2 packs a day. He says he is just bored at home and not doing anything particular, but he is thinking about stopping smoking. He has also not gone out and done his lab work and he has no way of checking his vitals.   The following staff were present during this visit: Provider only.   PAST MEDICAL HISTORY: Coronary artery disease with previous stent to the RCA; Hyperlipidemia; Hypertension; Nicotine dependence.   CURRENT MEDICATIONS: Rosuvastatin 40 mg daily; nitroglycerin 0.4 mg p.r.n.; aspirin 81 mg daily; hydrocodone t.i.d.; lisinopril 10 mg daily; omeprazole 40 mg daily; prazosin 2 mg daily; Seroquel 300 mg daily; Toprol 25 mg 1/2 b.i.d.; Symbicort b.i.d.; albuterol p.r.n.; Spiriva p.r.n.      ALLERGIES:  No known drug allergies.       Review of Systems  · Cardiovascular  o Admits  o : shortness of breath while walking or lying flat  o Denies  o : palpitations (fast, fluttering, or skipping beats), swelling (feet, ankles, hands), chest pain or angina pectoris  "  · Respiratory  o Denies  o : chronic or frequent cough      Vitals     Patient unable to obtain vitals.  Weight 192.  Height 6'0\".       Physical Examination  · Labs  o Labs  o : Not done recently.          Assessment     1.  Coronary artery disease with previous stent without angina.  2.  Shortness of breath related to chronic lung disease, stable.  3.  Hypertension, uncertain control.  4.  Hyperlipidemia, unknown control.  5.  Nicotine dependence.       Plan     1.  He agrees to get his labs done in the next few weeks, and we will adjust meds if needed.  2.  Smoking cessation counseling was performed.  He said he has tried a lot of medications before with no        success.  He is on Seroquel by a psych provider.  Encouraged to discuss if it is okay to add Wellbutrin to        help with smoking cessation.    3.  Follow up in 6 months with labs or earlier if needed.        BENNIE Grijalva  JF:sima             Electronically Signed by: Mona Simpson-, Other -Author on November 23, 2020 08:04:14 AM  Electronically Co-signed by: BENNIE Dupree -Reviewer on November 27, 2020 09:36:13 PM  "

## 2021-05-14 VITALS
SYSTOLIC BLOOD PRESSURE: 138 MMHG | WEIGHT: 179.25 LBS | TEMPERATURE: 97.8 F | DIASTOLIC BLOOD PRESSURE: 91 MMHG | OXYGEN SATURATION: 100 % | HEIGHT: 72 IN | HEART RATE: 92 BPM | BODY MASS INDEX: 24.28 KG/M2

## 2021-05-14 VITALS — TEMPERATURE: 96.9 F | BODY MASS INDEX: 24.57 KG/M2 | HEIGHT: 72 IN | WEIGHT: 181.37 LBS

## 2021-05-14 NOTE — PROGRESS NOTES
Progress Note      Patient Name: Hollis Haji   Patient ID: [id number]   Sex: Male   YOB: 1965    Primary Care Provider: SETH AVERY   Referring Provider: Jannet AVERY    Visit Date: April 13, 2021    Provider: Eugenio Cedeño DO   Location: [facility]   Location Address: [address] [place] [address]  [id number]   Location Phone: [phone]          Chief Complaint  · Follow up - HTN, Bipolar 1 disorder, HLD, CAD, Cervical Spine Stenosis, COPD  · medication refills      History Of Present Illness  Hollis Haji is a 56 year old /White male who presents for evaluation and treatment of: HTN- He states that he does NOT check his BP outside the office.      HLD- he states that he is taking his statin daily        CAD- No chest pain. He had a recent thoracic surgery for removed of right lung lobes due to cancer and had no cardiac issues.         COPD- He is slightly more sob since his lobectomy. He is still smoking        Lung Cancer- He had lobectomy just 2 weeks ago. He is still to do chemotherapy.       Past Medical History  Disease Name Date Onset Notes   Anxiety --  --    Arthritis --  --    Bipolar 1 disorder, mixed, mild 08/29/2019 He goes to [facility] and sees a Counsellor   Cervical spinal stenosis 10/19/2016 C5/6 secondary to central disc protrusion   Cervical spine pain --  --    COPD (chronic obstructive pulmonary disease) --  --    Coronary artery disease --  --    Depression --  --    Essential hypertension --  --    Heart Attack (MI) --  --    Herniated disc, C5-6 10/19/2016 --    Hyperlipemia --  --    PTSD (post-traumatic stress disorder) 08/29/2019 --    Small cell carcinoma of lung --  --    Tobacco abuse 08/29/2019 --    Tobacco abuse counseling 08/29/2019 He has tried it all w/o any success         Past Surgical History  Procedure Name Date Notes   Arm Surgery 2015 2013   Colonoscopy 2017 --    Direct  Consent: Doc Bernabe, who was seen by synchronous (real-time) audio-video technology, and/or her healthcare decision maker, is aware that this patient-initiated, Telehealth encounter on 5/28/2020 is a billable service, with coverage as determined by her insurance carrier. She is aware that she may receive a bill and has provided verbal consent to proceed: Yes. Doc Bernabe is a 39 y.o. female    Patient's last menstrual period was 05/23/2020 (exact date). has a past medical history of BMI 30.0-30.9,adult (3/17/2017), COVID-19 (5/28/2020), IBS (irritable bowel syndrome), IUD (intrauterine device) in place (3/17/2017), Pre-diabetes (4/19/2017), Stool color black, and Vitamin D deficiency (4/19/2017). 4/18/2020 [facility] She is a CNA nurse. Symptomatic 5 days of cough, fever, headache, sore throat, myalgias, lost sense of smell. Still testing Positive 2.5 weeks after. Then at 3.5 weeks she tested negative. Adjustment disorder with anxiety about health  Emotionally she's doing a whole lot better since our last conversation. Sleep is better. HLD: we discussed diet for now    Prediabetes: a1c 5.8% 5/2020    Weight loss counseling we discussed exercise and diet and calories count. Aim for 1500 daily. Reviewed: active problem list, medication list, allergies, notes from last encounter, lab results    A comprehensive review of systems was negative except for that written in the HPI. Assessment & Plan:   Diagnoses and all orders for this visit:    1. High cholesterol    2. Prediabetes    3. Weight loss counseling, encounter for    4. COVID-19      Follow-up and Dispositions    · Return in about 1 year (around 5/28/2021) for annual exam, sooner as needed.        I spent at least 15 minutes with this established patient, and >50% of the time was spent counseling and/or coordinating care regarding annual exam and stress, was positive for covid 19  998  Subjective:   Conrad Cooper is a 39 y.o. female who was seen for Results and Cough      Prior to Admission medications    Medication Sig Start Date End Date Taking? Authorizing Provider   cyanocobalamin 1,000 mcg tablet Take 1,000 mcg by mouth daily. Yes Provider, Historical   cholecalciferol (VITAMIN D3) (1000 Units /25 mcg) tablet Take 1,000 Units by mouth daily. Yes Provider, Historical   ascorbate sodium/Zn/Echin purp (ZINC WITH VIT C AND ECHINACEA PO) Take  by mouth. Yes Provider, Historical   homeopathic drugs (SAMBUCUS PO) Take  by mouth. Yes Provider, Historical   promethazine-dextromethorphan (PROMETHAZINE-DM) 6.25-15 mg/5 mL syrup Take 5 mL by mouth nightly as needed for Cough. 4/18/20  Yes Rani Quintana MD   fluticasone propionate (FLONASE) 50 mcg/actuation nasal spray 2 Sprays by Both Nostrils route daily.  3/6/20  Yes Stephane Calhoun MD     Allergies   Allergen Reactions    Latex Rash    Banana Other (comments)     GERD    Kiwi Other (comments)     Itchy throat    Macrobid [Nitrofurantoin Monohyd/M-Cryst] Hives       Past Medical History:   Diagnosis Date    BMI 30.0-30.9,adult 3/17/2017    COVID-19 5/28/2020    IBS (irritable bowel syndrome)     IUD (intrauterine device) in place 3/17/2017    Pre-diabetes 4/19/2017    Stool color black     Vitamin D deficiency 4/19/2017     Past Surgical History:   Procedure Laterality Date    HX ORTHOPAEDIC      Lt wrist cyst removed         Objective:   Vital Signs: (As obtained by patient/caregiver at home)  Visit Vitals  /84   Pulse 89   Ht 5' 2\" (1.575 m)   Wt 155 lb 9.6 oz (70.6 kg)   LMP 05/23/2020 (Exact Date)   BMI 28.46 kg/m²        Constitutional: [x] Appears well-developed and well-nourished [x] No apparent distress      [] Abnormal -     Mental status: [x] Alert and awake  [x] Oriented to person/place/time [x] Able to follow commands    [] Abnormal -     Eyes:   EOM    [x]  Normal    [] Abnormal -   Sclera  [x] revascularization cardiac with catheter stent, prosthesis, or vein graft --  --    skin graft --  --          Medication List  Name Date Started Instructions   albuterol sulfate 90 mcg/actuation inhalation HFA aerosol inhaler  inhale 1 puff (90 mcg) by inhalation route every 6 hours as needed   Aspir-81 81 mg oral tablet,delayed release (DR/EC)  take 1 tablet (81 mg) by oral route once daily   budesonide-formoterol 160-4.5 mcg/actuation inhalation HFA aerosol inhaler 11/04/2020 INHALE 2 PUFFS TWICE DAILY   hydrocodone-acetaminophen 7.5-325 mg oral tablet  take 1 tablet by oral route every 8 hours as needed for pain   hydroxyzine HCl 50 mg oral tablet  take 1 tablet (50 mg) by oral route 4 times per day   lisinopril 10 mg oral tablet 11/04/2020 TAKE ONE TABLET BY MOUTH EVERY DAY   metoprolol succinate 25 mg oral tablet extended release 24 hr 11/04/2020 TAKE 1/2 TABLET BY MOUTH TWICE DAILY   Nitrostat 0.4 mg sublingual tablet, sublingual 12/10/2019 place 1 tablet (0.4 mg) by sublingual route every 5 minutes as needed for chest pain. Do not exceed 3 doses in 15 minutes.   omeprazole 40 mg oral capsule,delayed release(DR/EC) 11/04/2020 TAKE ONE CAPSULE BY MOUTH ONCE EVERY DAY BEFORE a MEAL   oxycodone 5 mg oral tablet  take 1 tablet (5 mg) by oral route every 6 hours as needed   prazosin 2 mg oral capsule  take 1 capsule by oral route daily for 999 days   ProAir HFA 90 mcg/actuation inhalation HFA aerosol inhaler  inhale 1 puff (90 mcg) by inhalation route every 6 hours as needed   quetiapine 300 mg oral tablet  take 1 tablet (300 mg) by oral route once daily   rosuvastatin 40 mg oral tablet 10/27/2020 TAKE ONE TABLET BY MOUTH EVERY DAY   Symbicort 160-4.5 mcg/actuation inhalation HFA aerosol inhaler 09/01/2020 2 PUFF INH RTBID   Ventolin HFA 90 mcg/actuation inhalation HFA aerosol inhaler  --          Allergy List  Allergen Name Date Reaction Notes   NO KNOWN DRUG ALLERGIES --  --  --        Allergies  Normal    [] Abnormal -          Discharge [x]  None visible   [] Abnormal -     HENT: [x] Normocephalic, atraumatic  [] Abnormal -   [] Mouth/Throat: Mucous membranes are moist    External Ears [x] Normal  [] Abnormal -    Neck: [x] No visualized mass [] Abnormal -     Pulmonary/Chest: [x] Respiratory effort normal   [x] No visualized signs of difficulty breathing or respiratory distress        [] Abnormal -      Musculoskeletal:   [x] Normal gait with no signs of ataxia         [x] Normal range of motion of neck        [] Abnormal -     Neurological:        [x] No Facial Asymmetry (Cranial nerve 7 motor function) (limited exam due to video visit)          [x] No gaze palsy        [] Abnormal -          Skin:        [x] No significant exanthematous lesions or discoloration noted on facial skin                Psychiatric:       [x] Normal Affect [] Abnormal -        [x] No Hallucinations      We discussed the expected course, resolution and complications of the diagnosis(es) in detail. Medication risks, benefits, costs, interactions, and alternatives were discussed as indicated. I advised her to contact the office if her condition worsens, changes or fails to improve as anticipated. She expressed understanding with the diagnosis(es) and plan. Leonarda Snider is a 39 y.o. female being evaluated by a video visit encounter for concerns as above. A caregiver was present when appropriate. Due to this being a TeleHealth encounter (During [facility]- public health emergency), evaluation of the following organ systems was limited: Vitals/Constitutional/EENT/Resp/CV/GI//MS/Neuro/Skin/Heme-Lymph-Imm.   Pursuant to the emergency declaration under the [address] [place] [address] and the [employer] Act, this Virtual  Visit was conducted, with patient's (and/or legal guardian's) consent, to reduce the patient's risk of Reconciled  Family Medical History  Disease Name Relative/Age Notes   Adopted - no noted history  --    Unobtainable family history due to adoption  --          Social History  Finding Status Start/Stop Quantity Notes   Alcohol Never --/-- --  does not drink, less than 1 drink per day, has been drinking for less than 1 year  08/29/2019 -    lives with spouse --  --/-- --  --     --  --/-- --  --    Tobacco Current every day --/-- 2 PPD current every day smoker, 2 packs per day, smoked 31 or more years   Unemployed --  --/-- --  --          Immunizations  NameDate Admin Mfg Trade Name Lot Number Route Inj VIS Given VIS Publication   Owomdjtqp30/30/2019 SKB Fluarix, quadrivalent, preservative free [id number] NE NE 09/30/2019    Comments: [facility]   [id number]/08/2018 NE Not Entered  NE NE 01/08/2018    Comments: [facility]         Review of Systems  · Constitutional  o Admits  o : fatigue  · Eyes  o Denies  o : blurred vision, changes in vision  · HENT  o Denies  o : headaches  · Cardiovascular  o Denies  o : chest pain, irregular heart beats, rapid heart rate  · Respiratory  o Admits  o : dyspnea on exertion  o Denies  o : shortness of breath, wheezing, cough      Vitals  Date Time BP Position Site L\R Cuff Size HR RR TEMP (F) WT  HT  BMI kg/m2 BSA m2 O2 Sat FR L/min FiO2 HC       03/02/2020 10:01 /65 Sitting    73 - R   204lbs 16oz 6'   27.8 2.17 97 %  21%    03/17/2020 10:58 /63 Sitting    70 - R  98.8 201lbs 4oz 6'   27.29 2.15       04/13/2021 11:08 /91 Sitting    92 - R  97.8 179lbs 4oz 6'   24.31 2.03 100 %  21%          Physical Examination  · Constitutional  o Appearance  o : well-nourished, well developed, alert, in no acute distress, well-tended appearance  · Head and Face  o Head  o :   § Inspection  § : atraumatic, normocephalic  o Face  o :   § Inspection  § : no facial lesions  o HEENT  o : Unremarkable  · Eyes  o Conjunctivae  o : conjunctivae normal  o Sclerae  o :  exposure to COVID-19 and provide necessary medical care. Services were provided through a video synchronous discussion virtually to substitute for in-person clinic visit. Patient and provider were located at their individual homes.         Alida Moncada MD sclerae white  o Pupils and Irises  o : pupils equal and round, pupils reactive to light bilaterally  o Eyelids/Ocular Adnexae  o : eyelid appearance normal  · Ears, Nose, Mouth and Throat  o Ears  o :   § External Ears  § : appearance within normal limits, no lesions present  § Otoscopic Examination  § : tympanic membrane appearance within normal limits bilaterally without perforations, mobility normal  o Nose  o :   § External Nose  § : appearance normal  o Oral Cavity  o :   § Oral Mucosa  § : oral mucosa normal  § Lips  § : lip appearance normal  § Teeth  § : eudentureless  § Gums  § : gums pink, non-swollen, no bleeding present  § Tongue  § : tongue appearance normal  § Palate  § : hard palate normal, soft palate appearance normal  o Throat  o :   § Oropharynx  § : no inflammation or lesions present, tonsils within normal limits  · Neck  o Inspection/Palpation  o : normal appearance, no masses or tenderness, trachea midline  o Thyroid  o : gland size normal, nontender, no nodules or masses present on palpation  · Respiratory  o Respiratory Effort  o : breathing unlabored  o Auscultation of Lungs  o : normal breath sounds  · Cardiovascular  o Heart  o :   § Auscultation of Heart  § : regular rate, normal rhythm, no murmurs present  o Peripheral Vascular System  o :   § Extremities  § : no edema  · Lymphatic  o Neck  o : no lymphadenopathy           Assessment  · Screening for depression     V79.0/Z13.89  · Essential hypertension     401.9/I10  good control  · Tobacco abuse     305.1/Z72.0  I instructed him he needs to QUIT!  · Hyperlipemia     272.4/E78.5  will update   · Coronary artery disease     414.00/I25.10  stable  · Small cell carcinoma of lung     162.9/C34.90  [facility]      Plan  · Orders  o ACO-18: Negative screen for clinical depression using a standardized tool () - V79.0/Z13.89 - 04/13/2021  o CBC with Auto Diff [facility] (24482) - 162.9/C34.90 - 04/13/2021  o CMP  [facility] (93930) - 272.4/E78.5, 414.00/I25.10, 401.9/I10 - 04/13/2021  o Lipid Panel [facility] (69868) - 272.4/E78.5, 414.00/I25.10, 401.9/I10 - 04/13/2021  o ACO-39: Current medications updated and reviewed (1159F, ) - - 04/13/2021  · Medications  o lisinopril 10 mg oral tablet   SIG: take 1 tablet (10 mg) by oral route once daily for 90 days   DISP: (90) Tablet with 3 refills  Adjusted on 04/13/2021     o metoprolol succinate 25 mg oral tablet extended release 24 hr   SIG: TAKE 1/2 TABLET BY MOUTH TWICE DAILY   DISP: (90) Tablet with 3 refills  Adjusted on 04/13/2021     o omeprazole 40 mg oral capsule,delayed release(DR/EC)   SIG: take 1 capsule (40 mg) by oral route once daily before a meal for 90 days   DISP: (90) Capsule with 3 refills  Adjusted on 04/13/2021     o rosuvastatin 40 mg oral tablet   SIG: take 1 tablet (40 mg) by oral route once daily at bedtime for 90 days   DISP: (90) Tablet with 3 refills  Adjusted on 04/13/2021     o Medications have been Reconciled  o Transition of Care or Provider Policy  · Instructions  o Depression Screen completed and scanned into the EMR under the designated folder within the patient's documents.  o Today's PHQ-9 result is 3  o Patient advised to monitor blood pressure (B/P) at home and journal readings. Patient informed that a B/P reading at home of more than 130/80 is considered hypertension. For readings greater yrko178/90 or higher patient is advised to follow up in the office with readings for management. Patient advised to limit sodium intake.  o Patient is taking medications as prescribed and doing well.   o Take all medications as prescribed/directed.  o Patient instructed/educated on their diet and exercise program.  o Patient was educated/instructed on their diagnosis, treatment and medications prior to discharge from the clinic today.  o Patient counseled to stop smoking.  o Patient instructed to seek medical attention urgently for new or worsening  symptoms.  o Call the office with any concerns or questions.  o Bring all medicines with their bottles to each office visit.  · Disposition  o Call or Return if symptoms worsen or persist.  o Return Visit Request in/on 6 months +/- 2 days ([id number]).            Electronically Signed by: Eugenio Cedeño, DO -Author on April 13, 2021 11:52:03 AM

## 2021-05-15 VITALS
HEART RATE: 73 BPM | HEIGHT: 72 IN | BODY MASS INDEX: 27.77 KG/M2 | SYSTOLIC BLOOD PRESSURE: 104 MMHG | WEIGHT: 205 LBS | DIASTOLIC BLOOD PRESSURE: 65 MMHG | OXYGEN SATURATION: 97 %

## 2021-05-15 VITALS
WEIGHT: 204 LBS | HEIGHT: 72 IN | TEMPERATURE: 97.7 F | BODY MASS INDEX: 27.63 KG/M2 | OXYGEN SATURATION: 97 % | HEART RATE: 70 BPM | SYSTOLIC BLOOD PRESSURE: 106 MMHG | DIASTOLIC BLOOD PRESSURE: 68 MMHG

## 2021-05-15 VITALS
HEART RATE: 70 BPM | BODY MASS INDEX: 27.26 KG/M2 | HEIGHT: 72 IN | WEIGHT: 201.25 LBS | TEMPERATURE: 98.8 F | DIASTOLIC BLOOD PRESSURE: 63 MMHG | SYSTOLIC BLOOD PRESSURE: 108 MMHG

## 2021-05-15 VITALS
SYSTOLIC BLOOD PRESSURE: 105 MMHG | DIASTOLIC BLOOD PRESSURE: 62 MMHG | BODY MASS INDEX: 28.21 KG/M2 | WEIGHT: 208.25 LBS | HEIGHT: 72 IN | OXYGEN SATURATION: 96 % | HEART RATE: 61 BPM

## 2021-05-15 VITALS
WEIGHT: 206 LBS | SYSTOLIC BLOOD PRESSURE: 114 MMHG | HEART RATE: 82 BPM | BODY MASS INDEX: 27.9 KG/M2 | DIASTOLIC BLOOD PRESSURE: 76 MMHG | HEIGHT: 72 IN

## 2021-05-15 VITALS
SYSTOLIC BLOOD PRESSURE: 116 MMHG | BODY MASS INDEX: 27.4 KG/M2 | DIASTOLIC BLOOD PRESSURE: 77 MMHG | WEIGHT: 202.31 LBS | HEIGHT: 72 IN

## 2021-05-15 VITALS
BODY MASS INDEX: 26.41 KG/M2 | WEIGHT: 195 LBS | HEIGHT: 72 IN | SYSTOLIC BLOOD PRESSURE: 122 MMHG | DIASTOLIC BLOOD PRESSURE: 88 MMHG | HEART RATE: 60 BPM

## 2021-05-15 VITALS — HEART RATE: 77 BPM | HEIGHT: 72 IN | WEIGHT: 204 LBS | BODY MASS INDEX: 27.63 KG/M2

## 2021-05-15 VITALS — HEIGHT: 72 IN | BODY MASS INDEX: 27.65 KG/M2 | WEIGHT: 204.12 LBS | RESPIRATION RATE: 12 BRPM

## 2021-05-16 VITALS
WEIGHT: 204 LBS | BODY MASS INDEX: 27.63 KG/M2 | DIASTOLIC BLOOD PRESSURE: 88 MMHG | HEART RATE: 64 BPM | SYSTOLIC BLOOD PRESSURE: 132 MMHG | HEIGHT: 72 IN

## 2021-05-16 VITALS
SYSTOLIC BLOOD PRESSURE: 122 MMHG | BODY MASS INDEX: 26.55 KG/M2 | HEIGHT: 72 IN | DIASTOLIC BLOOD PRESSURE: 86 MMHG | WEIGHT: 196 LBS

## 2021-05-16 VITALS — RESPIRATION RATE: 16 BRPM | HEIGHT: 72 IN | BODY MASS INDEX: 27.63 KG/M2 | WEIGHT: 204 LBS

## 2021-05-16 VITALS
BODY MASS INDEX: 26.55 KG/M2 | WEIGHT: 196 LBS | HEART RATE: 70 BPM | DIASTOLIC BLOOD PRESSURE: 88 MMHG | HEIGHT: 72 IN | SYSTOLIC BLOOD PRESSURE: 114 MMHG

## 2021-05-20 ENCOUNTER — HOSPITAL ENCOUNTER (OUTPATIENT)
Dept: ONCOLOGY | Facility: HOSPITAL | Age: 56
Discharge: HOME | End: 2021-05-20
Attending: INTERNAL MEDICINE

## 2021-05-20 ENCOUNTER — OFFICE VISIT CONVERTED (OUTPATIENT)
Dept: ONCOLOGY | Facility: HOSPITAL | Age: 56
End: 2021-05-20
Attending: INTERNAL MEDICINE

## 2021-05-20 LAB
ALBUMIN SERPL-MCNC: 4.2 G/DL (ref 3.5–5)
ALBUMIN/GLOB SERPL: 1.5 {RATIO} (ref 1.4–2.6)
ALP SERPL-CCNC: 92 U/L (ref 56–119)
ALT SERPL-CCNC: 12 U/L (ref 10–40)
ANION GAP SERPL CALC-SCNC: 11 MMOL/L (ref 8–19)
AST SERPL-CCNC: 16 U/L (ref 15–50)
BASOPHILS # BLD AUTO: 0.05 10*3/UL (ref 0–0.2)
BASOPHILS NFR BLD AUTO: 0.3 % (ref 0–3)
BILIRUB SERPL-MCNC: <0.15 MG/DL (ref 0.2–1.3)
BUN SERPL-MCNC: 12 MG/DL (ref 5–25)
BUN/CREAT SERPL: 14 {RATIO} (ref 6–20)
CALCIUM SERPL-MCNC: 9.5 MG/DL (ref 8.7–10.4)
CHLORIDE SERPL-SCNC: 100 MMOL/L (ref 99–111)
CONV ABS IMM GRAN: 0.02 10*3/UL (ref 0–0.54)
CONV CO2: 27 MMOL/L (ref 22–32)
CONV EOSINOPHILS PERCENT BY MANUAL COUNT: 1.5 % (ref 0–7)
CONV IMMATURE GRAN: 0.1 % (ref 0–0.4)
CONV TOTAL PROTEIN: 7 G/DL (ref 6.3–8.2)
CREAT UR-MCNC: 0.86 MG/DL (ref 0.7–1.2)
EOSINOPHIL # BLD MANUAL: 0.26 10*3/UL (ref 0–0.7)
ERYTHROCYTE [DISTWIDTH] IN BLOOD BY AUTOMATED COUNT: 15.9 % (ref 11.5–14.5)
ERYTHROCYTE [DISTWIDTH] IN BLOOD BY AUTOMATED COUNT: 52 FL
GFR SERPLBLD BASED ON 1.73 SQ M-ARVRAT: >60 ML/MIN/{1.73_M2}
GLOBULIN UR ELPH-MCNC: 2.8 G/DL (ref 2–3.5)
GLUCOSE SERPL-MCNC: 90 MG/DL (ref 70–99)
HBA1C MFR BLD: 13.4 G/DL (ref 14–18)
HCT VFR BLD AUTO: 41.6 % (ref 42–52)
LYMPHOCYTES # BLD AUTO: 3.43 10*3/UL (ref 1–5)
LYMPHOCYTES NFR BLD AUTO: 20.2 % (ref 20–45)
MAGNESIUM SERPL-MCNC: 1.96 MG/DL (ref 1.6–2.3)
MCH RBC QN AUTO: 28.4 PG (ref 27–31)
MCHC RBC AUTO-ENTMCNC: 32.2 G/DL (ref 33–37)
MCV RBC AUTO: 88.1 FL (ref 80–96)
MONOCYTES # BLD AUTO: 1.31 10*3/UL (ref 0.2–1.2)
MONOCYTES NFR BLD MANUAL: 7.7 % (ref 3–10)
NEUTROPHILS # BLD AUTO: 11.87 10*3/UL (ref 2–8)
NEUTROPHILS NFR BLD MANUAL: 70.2 % (ref 30–85)
OSMOLALITY SERPL CALC.SUM OF ELEC: 277 MOSM/KG (ref 273–304)
PLATELET # BLD AUTO: 279 10*3/UL (ref 130–400)
PMV BLD AUTO: 10.5 FL (ref 7.4–10.4)
POTASSIUM SERPL-SCNC: 4.4 MMOL/L (ref 3.5–5.3)
RBC MORPH BLD: 4.72 10*6/UL (ref 4.7–6.1)
SODIUM SERPL-SCNC: 134 MMOL/L (ref 135–147)
WBC # BLD AUTO: 16.94 10*3/UL (ref 4.8–10.8)

## 2021-05-23 ENCOUNTER — TRANSCRIBE ORDERS (OUTPATIENT)
Dept: ADMINISTRATIVE | Facility: HOSPITAL | Age: 56
End: 2021-05-23

## 2021-05-23 DIAGNOSIS — Z12.2 ENCOUNTER FOR SCREENING FOR LUNG CANCER: Primary | ICD-10-CM

## 2021-05-28 VITALS
SYSTOLIC BLOOD PRESSURE: 102 MMHG | TEMPERATURE: 96.7 F | HEIGHT: 72 IN | WEIGHT: 183.2 LBS | OXYGEN SATURATION: 99 % | WEIGHT: 181 LBS | DIASTOLIC BLOOD PRESSURE: 70 MMHG | TEMPERATURE: 96.5 F | RESPIRATION RATE: 20 BRPM | SYSTOLIC BLOOD PRESSURE: 104 MMHG | BODY MASS INDEX: 24.52 KG/M2 | HEART RATE: 93 BPM | BODY MASS INDEX: 24.85 KG/M2 | OXYGEN SATURATION: 100 % | DIASTOLIC BLOOD PRESSURE: 65 MMHG | HEART RATE: 75 BPM

## 2021-05-28 VITALS
SYSTOLIC BLOOD PRESSURE: 118 MMHG | OXYGEN SATURATION: 96 % | WEIGHT: 194 LBS | RESPIRATION RATE: 18 BRPM | BODY MASS INDEX: 26.28 KG/M2 | TEMPERATURE: 97.6 F | HEIGHT: 72 IN | DIASTOLIC BLOOD PRESSURE: 66 MMHG | HEART RATE: 89 BPM

## 2021-05-28 VITALS
RESPIRATION RATE: 14 BRPM | DIASTOLIC BLOOD PRESSURE: 71 MMHG | OXYGEN SATURATION: 97 % | WEIGHT: 202 LBS | SYSTOLIC BLOOD PRESSURE: 116 MMHG | HEART RATE: 73 BPM | RESPIRATION RATE: 14 BRPM | SYSTOLIC BLOOD PRESSURE: 111 MMHG | HEART RATE: 68 BPM | BODY MASS INDEX: 28.58 KG/M2 | DIASTOLIC BLOOD PRESSURE: 76 MMHG | WEIGHT: 211 LBS | TEMPERATURE: 98 F | OXYGEN SATURATION: 95 % | TEMPERATURE: 98.2 F | HEIGHT: 72 IN | BODY MASS INDEX: 27.36 KG/M2 | HEIGHT: 72 IN

## 2021-05-28 VITALS
RESPIRATION RATE: 12 BRPM | DIASTOLIC BLOOD PRESSURE: 71 MMHG | OXYGEN SATURATION: 100 % | SYSTOLIC BLOOD PRESSURE: 114 MMHG | HEART RATE: 61 BPM | WEIGHT: 199 LBS | BODY MASS INDEX: 26.95 KG/M2 | HEIGHT: 72 IN | TEMPERATURE: 97.6 F

## 2021-05-28 VITALS
OXYGEN SATURATION: 98 % | TEMPERATURE: 98.2 F | HEIGHT: 72 IN | SYSTOLIC BLOOD PRESSURE: 116 MMHG | DIASTOLIC BLOOD PRESSURE: 64 MMHG | WEIGHT: 200.44 LBS | RESPIRATION RATE: 14 BRPM | BODY MASS INDEX: 27.15 KG/M2 | HEART RATE: 60 BPM

## 2021-05-28 VITALS
BODY MASS INDEX: 24.43 KG/M2 | TEMPERATURE: 98.4 F | WEIGHT: 178.79 LBS | DIASTOLIC BLOOD PRESSURE: 72 MMHG | RESPIRATION RATE: 20 BRPM | BODY MASS INDEX: 24.25 KG/M2 | RESPIRATION RATE: 18 BRPM | TEMPERATURE: 98 F | SYSTOLIC BLOOD PRESSURE: 121 MMHG | HEART RATE: 84 BPM | DIASTOLIC BLOOD PRESSURE: 83 MMHG | OXYGEN SATURATION: 95 % | OXYGEN SATURATION: 100 % | WEIGHT: 180.12 LBS | SYSTOLIC BLOOD PRESSURE: 140 MMHG | HEART RATE: 96 BPM

## 2021-05-28 VITALS
DIASTOLIC BLOOD PRESSURE: 70 MMHG | RESPIRATION RATE: 18 BRPM | HEIGHT: 72 IN | TEMPERATURE: 96.1 F | HEART RATE: 92 BPM | BODY MASS INDEX: 25.06 KG/M2 | OXYGEN SATURATION: 98 % | WEIGHT: 185 LBS | SYSTOLIC BLOOD PRESSURE: 114 MMHG

## 2021-05-28 NOTE — PROGRESS NOTES
Patient: STEPHANE MCKEON     Acct: [id number]     Report: #[id number]  UNIT #: [id number]     : 1965    Encounter Date:2021  PRIMARY CARE: Eugenio Espinoza  ***Signed***  --------------------------------------------------------------------------------------------------------------------  Encounter Date      2021      LUNG CANCER            History of Present Illness      This is a pleasant 57 yo male who presents today for postoperative follow-up     after undergoing a right middle and lower bilobectomy for a stage IIb squamous     cell carcinoma.  Postoperatively he did well and was discharged home.  Since     discharge she is overall doing well except for complaints of neuropathic pain     that is exacerbated by coughing and movement.  He continues to smoke but     otherwise denies shortness of breath or dyspnea on exertion, fever, chills            Procedure: Right VATS exploration with posterior lateral thoracotomy and and     middle bilobectomy            Pathology: Stage IIb squamous cell carcinoma            ALLERGY/MEDS      Allergies      Coded Allergies:             NO KNOWN DRUG ALLERGIES (Verified  Allergy, Unknown, 21)            Medications      Last Reconciled on 21 13:16 by FLACO CAMACHO      MDI-Ipratropium/Albuterol (Combivent Respimat) 4 Gm Inh      1 PUFF INH RTQID, #1 INH         Reported         21       Lisinopril* (Lisinopril*) 5 Mg Tablet      MG PO HS, #30 TAB 0 Refills         Reported         21       HYDROcodone-Acetaminophen 7.5-325 Mg (HYDROcodone-Acetaminophen 7.5-325 Mg) 1     Tab Tablet      1 TAB PO Q8H PRN for PAIN, TAB         Reported         21       MDI-Albuterol (Proair HFA) 8.5 Gm Hfa.aer.ad      2 PUFFS INH Q4-6H PRN for SHORTNESS OF BREATH, #1 MDI 5 Refills         Prov: Rome Simpson         21       hydrOXYzine HCL (hydrOXYzine HCL) 50 Mg Tablet      80 MG PO TID, #90 TAB 0 Refills         Reported          12/16/20       Budesonide/Formoterol Fumarate (Symbicort 160/4.5 Mcg) Unknown Strength Inh      INH RTBID, #1 INH 0 Refills         Reported         12/8/20       Aspirin Chew (Aspirin Baby) 81 Mg Tab.chew      81 MG PO QDAY, #30 TAB.CHEW 0 Refills         Reported         12/8/20       Tiotropium Bromide (Spiriva Respimat 2.5 mcg/Puff) 4 Gm Mist.inhal               Prov: [facility]         1/24/20       Rosuvastatin Calcium (Crestor*) 40 Mg Tablet      40 MG PO HS, #30 TAB 0 Refills         Reported         11/7/19       Omeprazole (priLOSEC) 10 Mg Capsule.dr      40 MG PO QDAY, CAP         Reported         11/7/19       Prazosin HCl (Prazosin HCl) 2 Mg Capsule      2 MG PO QDAY, #60 CAP 0 Refills         Reported         7/1/19       QUEtiapine XR (SEROquel XR) 300 Mg Tab.er.24h      300 MG PO HS for 30 Days, #30 TAB.SR         Reported         6/29/17       Metoprolol Tartrate (Metoprolol Tartrate) 25 Mg Tablet      25 MG PO BID, #90 TAB 0 Refills         Reported         6/29/17      Medications Reviewed:  No Changes made to meds            Yes: Other Surgeries      Smoking status:  Current every day smoker (1.5 ppd x 40 years, 1 ppd currently)      Smoking packs/day:  1      Smoking history:  > 50 pack years      Alcohol intake:  None      Medical History:  Yes: Arthritis (BACK), Chronic Bronchitis/COPD (INHALER),     Depression, Anxiety, Bipolar Disorder, PTSD, Heart Attack (2013- 2 STENTS PLACED    SEES DR. OATES STRESS TEST 5/2017-OK), High Blood Pressure, Reflux Disease,     Shortness Of Breath; No: Blood Disease, Chemotherapy/Cancer, Deafness or Ringing    Ears, Seizures, Hemorrhoids/Rectal Prob, Sinus Trouble, Miscellaneous     Medical/oth            Vitals      Weight 178 lbs 12.689 oz / 81.1 kg      Temperature 98.4 F / 36.89 C - Temporal      Pulse 96      Respirations 18      Blood Pressure 140/72 Sitting, Left Arm      Pulse Oximetry 100%, RM AIR            General Appearance:  Alert,  Oriented X3      HEENT:  Orophraynx clear, No Erythema, No Exudates      Respiratory:  CTAB, Other (Incision well-healed no erythema or exudate)      Abdomen:  Soft, No NABS, No Masses, No Hernias, No Hepatosplenomegaly      Cardiovascular:  No Chest Tenderness; Regular Rate and Rhythm      Lymphatic:  No Neck      Extremeties:  Pulses Positive all 4 Ext      Neurological:  Mental Status WNL      Skin:  No Rash, No Cellulitis      Psychiatric:  Appropriate Affect            Imaging/Interpretation      I Personally reviewed the chest x-ray which shows expected postoperative changes    and a small right apical pneumothorax            Overall Mr. Haji is doing well and pleased with his progress.  We reviewed     his pathology with the patient and his wife.  We will plan to see him in     continued follow-up in 2 weeks along with medical oncology for consideration of     adjuvant therapy.  We will also plan to refill his oxycodone as well as start     him on gabapentin 300 mg p.o. 3 times daily to assist with his postthoracotomy     pain            PREVENTION      Hx Influenza Vaccination:  Yes      Date Influenza Vaccine Given:  Dec 1, 2020      Influenza Vaccine Declined:  No      2 or More Falls in Past Year?:  No      Fall Past Year with Injury?:  No      Hx Pneumococcal Vaccination:  Yes      Encouraged to follow-up with:  PCP regarding preventative exams.      Chart initiated by      JOSEPH RANDOLPH MA            Electronically signed by CARLOS OVLALES  04/06/2021 14:14       Disclaimer: Converted document may not contain table formatting or lab diagrams. Please see [employer] for the authenticated document.

## 2021-05-28 NOTE — PROGRESS NOTES
Patient: STEPHANE MCKEON     Acct: [id number]     Report: #[id number]  UNIT #: [id number]     : 1965    Encounter Date:2021  PRIMARY CARE: Eugenio Espinoza  ***Signed***  --------------------------------------------------------------------------------------------------------------------  NURSE INTAKE      Visit Type      New Patient Visit            Chief Complaint      LUNG CANCER      Intent of Therapy:  Curative            Referring Provider/Copies To      Referring Provider:  [facility]      Copies To:   [facility]            History and Present Illness      HPI - Oncology Interim      Patient presents for evaluation of newly diagnosed clear cell carcinoma the     lung.  Patient reports that he was diagnosed with pneumonia twice since 2020.  He was treated with antibiotics.  His cough improved but did not ever     completely go away.  This eventually led to a CT scan which demonstrated a mass     in the right hilar area.  He was then referred to pulmonology.  He underwent br    onchoscopy with biopsy of the right lower lobe 2021 which showed squamous     cell carcinoma.  Lymph node sampling was negative in the 11 L and 7 station but     positive in the 11 R station (N1).  He then underwent PET scan and MRI of the     brain.  He continues to report some nonproductive cough.  He does have inhalers     and nebulizer for underlying COPD.  He is working toward smoking cessation and     is down from 2.5 packs/day to 1 pack/day.  He reports good appetite and energy     level.  He denies any masses lymphadenopathy.  No unusual aches or pains.      He presents today for discussion of treatment options.  His PET scan did show     some questionable area in the mouth.  He reports seeing ENT yesterday with a     negative evaluation.  Those records will be requested.            Cancer Details            Squamous cell carcinoma.  Right lower lobe.            Metastatic Sites       Lung            Clinical Staging      T2a, N1, M0 (stage IIb)            Clinical Trial Participant      No            ECOG Performance Status      0            Most Recent Lab Findings      Laboratory Tests      21 22:24            Laboratory Tests            Test       21      22:24             Magnesium Level       1.83 mg/dL      (1.60-2.30)            Most Recent Imaging Findings      Patient: STEPHANE MCKEON   Acct: #[id number]   Report: #[id number]            UNIT #: [id number]    DOS: 21 1509      INSURANCE:BLUE MEDICAID   ORDER #:[id number]      LOCATION:PET     : 1965            PROVIDERS      ADMITTING:     ATTENDING: BALTAZAR MUNGUIA      FAMILY:  Eugenio Espinoza   ORDERING:  BALTAZAR MUNGUIA         OTHER: Eugenio Espinoza   DICTATING:  Hugh Rojas MD            REQ #:[id number]   EXAM:ISKBSMIDTH - SKULL BASE-MIDTHIGH INIT fdg      REASON FOR EXAM:  R91.8      REASON FOR VISIT:  R91.8            *******Signed******         PROCEDURE:   PET CT SKULL BASE TO MID THIGH INITIAL             COMPARISON:   [facility], CT, CHEST W/O CONTRAST,     2021, 12:15.  [facility], CR, CHEST AP/PA 1 VIEW, 2020, 10:02.  [facility], CT,       ABDOMEN W/ CONTRAST, 2020, 13:15.  [facility], CT, CHEST W/O     CONTRAST,       2019, 13:17.             INDICATIONS:   LUNG MASS             TECHNIQUE:   After obtaining the patient's consent, F-18 FDG was administered     intravenously.  A PET       scan with concurrent CT imaging was performed from the skull to the thigh with     multiplanar imaging.             RADIONUCLIDE:     12.53 MCI   F18 FDG- I.V.      LABS:                          Blood Glucose 88 mg/dl      UPTAKE TIME:        59 mins      MEDIASTINAL BACKGROUND UPTAKE:  SUV MAX 1.8.             FINDINGS:         Marked activity in the mouth is probably due to muscular  activity, direct     visualization is       recommended.             Rounded 3.8 cm lobulated mass is again seen in the right hilum.  SUV max 17.2.      The right lower       lobe bronchus is occluded.             No abnormal uptake is seen in the abdomen and pelvis.  No abnormal skeletal     uptake is evident.             CONCLUSION:         PET-CT demonstrating marked activity in the mouth probably due to muscular acti    vity, direct       visualization is recommended.             3.8 cm lobulated hypermetabolic mass, right hilum.              LEOPOLDO ORDAZ MD             Electronically Signed and Approved By: LEOPOLDO ORDAZ MD on 2021 at 16:40                                  Until signed, this is an unconfirmed preliminary report that may contain      errors and is subject to change.                                              COUST:      D:21 1640            Patient: STEPHANE MCKEON   Acct: #[id number]   Report: #[id number]            UNIT #: [id number]    DOS: 21 1225      INSURANCE:BLUE MEDICAID   ORDER #:[id number]      LOCATION:MRI     : 1965            PROVIDERS      ADMITTING:     ATTENDING: BALTAZAR MUNGUIA      FAMILY:  NONE,MD   ORDERING:  BALTAZAR MUNGUIA         OTHER:    DICTATING:  JOBY PRICE MD            REQ #:[id number]   EXAM:BRAB - MRI BRAIN w wo CONTRAST      REASON FOR EXAM:  LUNG CA      REASON FOR VISIT:  LUNG CA            *******Signed******         PROCEDURE:   MRI BRAIN WITHOUT AND WITH CONTRAST             COMPARISON:   [facility], PET, SKULL BASE TO MID THIGH INITIAL,     2021, 15:28.        [facility], CT, CHEST W/O CONTRAST, 2021, 12:15.      [facility], CR, CHEST AP/PA 1 VIEW, 2020, 10:02.             INDICATIONS:   MALIGNANT NEOPLASM OF LUNG             CONTRAST:   18ml  Multihance I.V.             TECHNIQUE:   A variety of imaging planes and parameters  were utilized for     visualization of suspected       pathology.  Images were performed without and with gadolinium contrast.             FINDINGS:         There is no abnormal diffusion restriction or findings to indicate acute     ischemia.  No mass effect       or midline shift.  The ventricles and cortical sulci are normally configured.      There is no       enhancing intra-axial lesion to indicate intracranial metastasis.  Mild     prominence of the       ventricles and cortical sulci consistent with generalized cerebral atrophy.  No     findings of       intracranial hemorrhage.  No abnormal extra-axial fluid collection.  Globes are     intact and       symmetric.  There is no retro-orbital abnormality.  The mastoid air cells are we    ll-aerated.        Craniocervical junction within normal limits.  Posterior fossa without acute     abnormality.             CONCLUSION:         1. No findings of intracranial metastatic disease.      2. No acute intracranial abnormality.              JOBY PRICE MD             Electronically Signed and Approved By: JOBY PRICE MD on 2/05/2021 at 13:34                               Until signed, this is an unconfirmed preliminary report that may contain      errors and is subject to change.                                              JOBYM:      D:02/05/21 1334            PAST, FAMILY   Past Medical History      Past Medical History:  COPD, Depression, Heart Attack, High Cholesterol, Mental     Disease, Short of Air      Hematology/Oncology (M):  Lung Cancer, None      Genetic/Metabolic:  None            Past Surgical History      Lung Biopsy            Family History      Family History:  No Family History            Patient is adopted and does not know his biologic family history            Social History      Marital Status:        Lives independently:  Yes      Occupation:  DISABLED            Tobacco Use      Tobacco status:  Current every day smoker (1.5 ppd x  40 years, 1 ppd currently)      Smoking packs/day:  1      Currently Vaping:  No      Smoking history:  > 50 pack years            Alcohol Use      Alcohol intake:  None            Substance Use      Substance use:  Painkillers            REVIEW OF SYSTEMS      General:  Admits: Night Sweats      Respiratory:  Admits: Shortness of Air      Musculoskeletal:  Admits Back Pain      Psychiatric:  Admits Depression            VITAL SIGNS AND SCORES      Vitals      Height 6 ft  / 182.88 cm      Weight 181 lbs  / 82.177170 kg      BSA 2.04 m2      BMI 24.5 kg/m2      Temperature 96.5 F / 35.83 C      Pulse 93      Blood Pressure 102/65, Left Arm      Pulse Oximetry 99%            Pain Score      Experiencing any pain?:  Yes      Pain Scale Used:  Numerical      Pain Intensity:  7            Fatigue Score      Experiencing any fatigue?:  No            Rehab to be Ordered      Type of Referral to be Ordered:  No needs identified            EXAM      General Appearance:  Positive for: Alert, Cooperative;          Negative for: Acute Distress      Eye:  Positive for: Anicteric Sclerae, Moist Conjunctiva      Neck:  Positive for: Supple;          Negative for: JVD, Masses      Respiratory:  Positive for: CTAB, Normal Respiratory Effort      Abdomen/Gastro:  Positive for: Normal Active Bowel Sounds, Soft;          Negative for: Distention, Hepatosplenomegaly, Tenderness      Cardiovascular:  Positive for: RRR;          Negative for: Gallop, Murmur, Peripheral Edema, Rub      Psychiatric:  Positive for: Appropriate Affect, Intact Judgement      Lymphatic:  Negative for: Cervical, Infraclavicular, Supraclavicular            PREVENTION      Hx Influenza Vaccination:  Yes      Date Influenza Vaccine Given:  Dec 1, 2020      Influenza Vaccine Declined:  No      Hx Pneumococcal Vaccination:  Yes      Encouraged to follow-up with:  PCP regarding preventative exams.            ALLERGY/MEDS      Allergies      Coded Allergies:              NO KNOWN DRUG ALLERGIES (Verified  Allergy, Unknown, 2/4/21)            Medications      Last Reconciled on 2/11/21 13:16 by FLACO CAMACHO      MDI-Ipratropium/Albuterol (Combivent Respimat) 4 Gm Inh      1 PUFF INH RTQID, #1 INH         Reported         2/4/21       Lisinopril* (Lisinopril*) 5 Mg Tablet      MG PO HS, #30 TAB 0 Refills         Reported         1/29/21       HYDROcodone-Acetaminophen 7.5-325 Mg (HYDROcodone-Acetaminophen 7.5-325 Mg) 1     Tab Tablet      1 TAB PO Q8H PRN for PAIN, TAB         Reported         1/29/21       MDI-Albuterol (Proair HFA) 8.5 Gm Hfa.aer.ad      2 PUFFS INH Q4-6H PRN for SHORTNESS OF BREATH, #1 MDI 5 Refills         Prov: Rome Simpson         1/28/21       hydrOXYzine HCL (hydrOXYzine HCL) 50 Mg Tablet      80 MG PO TID, #90 TAB 0 Refills         Reported         12/16/20       Budesonide/Formoterol Fumarate (Symbicort 160/4.5 Mcg) Unknown Strength Inh      INH RTBID, #1 INH 0 Refills         Reported         12/8/20       Aspirin Chew (Aspirin Baby) 81 Mg Tab.chew      81 MG PO QDAY, #30 TAB.CHEW 0 Refills         Reported         12/8/20       Tiotropium Bromide (Spiriva Respimat 2.5 mcg/Puff) 4 Gm Mist.inhal               Prov: BALTAZAR MUNGUIA [facility]         1/24/20       Rosuvastatin Calcium (Crestor*) 40 Mg Tablet      40 MG PO HS, #30 TAB 0 Refills         Reported         11/7/19       Omeprazole (priLOSEC) 10 Mg Capsule.dr      40 MG PO QDAY, CAP         Reported         11/7/19       Prazosin HCl (Prazosin HCl) 2 Mg Capsule      2 MG PO QDAY, #60 CAP 0 Refills         Reported         7/1/19       QUEtiapine XR (SEROquel XR) 300 Mg Tab.er.24h      300 MG PO HS for 30 Days, #30 TAB.SR         Reported         6/29/17       Metoprolol Tartrate (Metoprolol Tartrate) 25 Mg Tablet      25 MG PO BID, #90 TAB 0 Refills         Reported         6/29/17      Medications Reviewed:  No Changes made to meds            IMPRESSION/PLAN      Diagnosis      Primary cancer of  right lower lobe of lung - C34.31      Biopsy-proven squamous cell carcinoma the lung.  Patient has good performance     status, ECOG PS 0.  The right lower lobe and 11 are samara station both positive     consistent with a T2 a, N1, M0 = stage IIb lung cancer.  Based on NCCN guide    lines, he will be referred to thoracic oncology for consideration of resection.     His recent PFTs appear to be adequate.  Resection will be followed by     consideration of adjuvant chemotherapy based on final pathologic stage.  I     encouraged patient to continue working toward smoking cessation which he is     actively doing.  I will see him back in the postoperative setting to review his     final pathology or sooner if he is deemed not to be a candidate for resection.            Pain      Follow-up with PCP/Pain Management            Advanced Care Plan Discussion      Declines Discussion [id number]            Tobacco Counseling      Tobacco Cessation Counseling:  for 3 - 10 minutes            Electronically signed by FLACO CAMACHO  02/11/2021 13:16       Disclaimer: Converted document may not contain table formatting or lab diagrams. Please see [employer] for the authenticated document.

## 2021-05-28 NOTE — PROGRESS NOTES
Patient: STEPHANE HAJI     Acct: [id number]     Report: #[id number]  UNIT #: [id number]     : 1965    Encounter Date:2021  PRIMARY CARE: Eugenio Espinoza  ***Signed***  --------------------------------------------------------------------------------------------------------------------  TELEHEALTH NOTE      History of Present Illness            Chief Complaint: (LUNG CA)            Stephane Haji is presenting for evaluation via Telehealth visit by phone.     Verbal consent obtained before beginning visit.            Provider spent (30) minutes with the patient during telehealth visit.            The following staff were present during the visit: (Dr. Simpson)                         Past Med History      This is a 54 yo male who was found to have a right lower lobe NSCLC after     experiencing two bouts of pneumonia.  He has a history of tobacco abuse and     recently cut from 2.5 ppd to 1 ppd.  He has undergone EBUS as well as staging P    ET CT and Brain MRI      Overview of Symptoms      FEV1 2.99 73%      DLCO 51%            I personally reviewed the PET CT:  FINDINGS:         Marked activity in the mouth is probably due to muscular activity, direct     visualization is       recommended.             Rounded 3.8 cm lobulated mass is again seen in the right hilum.  SUV max 17.2.      The right lower       lobe bronchus is occluded.             No abnormal uptake is seen in the abdomen and pelvis.  No abnormal skeletal     uptake is evident.             CONCLUSION:         PET-CT demonstrating marked activity in the mouth probably due to muscular     activity, direct       visualization is recommended.             3.8 cm lobulated hypermetabolic mass, right hilum.                   Brain MRI shows no evidence of metastatic disease.            EBUS showed evidence of NSCLC in the right hilar mass as well as 11R lymph node            Most Recent Lab Findings      Laboratory Tests       2/2/21 22:24            Laboratory Tests            Test       2/2/21      22:24             Magnesium Level       1.83 mg/dL      (1.60-2.30)            Allergies/Medications      Allergies:        Coded Allergies:             NO KNOWN DRUG ALLERGIES (Verified  Allergy, Unknown, 2/15/21)      Medications    Last Reconciled on 2/11/21 13:16 by FLACO CAMACHO      MDI-Ipratropium/Albuterol (Combivent Respimat) 4 Gm Inh      1 PUFF INH RTQID, #1 INH         Reported         2/4/21       Lisinopril* (Lisinopril*) 5 Mg Tablet      MG PO HS, #30 TAB 0 Refills         Reported         1/29/21       HYDROcodone-Acetaminophen 7.5-325 Mg (HYDROcodone-Acetaminophen 7.5-325 Mg) 1     Tab Tablet      1 TAB PO Q8H PRN for PAIN, TAB         Reported         1/29/21       MDI-Albuterol (Proair HFA) 8.5 Gm Hfa.aer.ad      2 PUFFS INH Q4-6H PRN for SHORTNESS OF BREATH, #1 MDI 5 Refills         Prov: Rome Simpson         1/28/21       hydrOXYzine HCL (hydrOXYzine HCL) 50 Mg Tablet      80 MG PO TID, #90 TAB 0 Refills         Reported         12/16/20       Budesonide/Formoterol Fumarate (Symbicort 160/4.5 Mcg) Unknown Strength Inh      INH RTBID, #1 INH 0 Refills         Reported         12/8/20       Aspirin Chew (Aspirin Baby) 81 Mg Tab.chew      81 MG PO QDAY, #30 TAB.CHEW 0 Refills         Reported         12/8/20       Tiotropium Bromide (Spiriva Respimat 2.5 mcg/Puff) 4 Gm Mist.inhal               Prov: BAL[facility]AR MUNGUIA [facility]         1/24/20       Rosuvastatin Calcium (Crestor*) 40 Mg Tablet      40 MG PO HS, #30 TAB 0 Refills         Reported         11/7/19       Omeprazole (priLOSEC) 10 Mg Capsule.dr      40 MG PO QDAY, CAP         Reported         11/7/19       Prazosin HCl (Prazosin HCl) 2 Mg Capsule      2 MG PO QDAY, #60 CAP 0 Refills         Reported         7/1/19       QUEtiapine XR (SEROquel XR) 300 Mg Tab.er.24h      300 MG PO HS for 30 Days, #30 TAB.SR         Reported         6/29/17       Metoprolol Tartrate  (Metoprolol Tartrate) 25 Mg Tablet      25 MG PO BID, #90 TAB 0 Refills         Reported         6/29/17            Plan/Instructions      Mr. Haji presents today for evaluation of his newly diagnosed [id number] RLL sq    uamous cell ca.  We have reviewed his imaging and preoperative studies.  His     cardiopulmonary status supports resection.  We discussed the risks and benefits     of surgery including a lower lobectomy possible bilobectomy.  We will plan for     this in the near future.            Electronically signed by CARLOS OVALLES  02/17/2021 15:35       Disclaimer: Converted document may not contain table formatting or lab diagrams. Please see [employer] for the authenticated document.

## 2021-05-28 NOTE — PROGRESS NOTES
Patient: HOLLIS MCKEON     Acct: [id number]     Report: #[id number]  UNIT #: [id number]     : 1965    Encounter Date:2020  PRIMARY CARE: Eugenio Espinoza  ***Signed***  --------------------------------------------------------------------------------------------------------------------  History of Present Illness            Chief Complaint: F/U, COPD            Hollis Mckeon is presenting for evaluation via Telehealth visit. Verbal     consent obtained before beginning visit.            PAST MEDICAL HISTORY/OVERVIEW OF PATIENT SYMPTOMS            Symptoms: Cough, Wheezing, Soa            Any known Exposure to COVID-19: NO            Current Smoker: Pt started smoking at 11 years old, 1 1/2 ppd x 44 years, now     smokes 1 ppd            Provider spent 17 minutes with the patient during telehealth visit.            The following staff were present during this visit: Melody Jo CMA, Jannet AVERY             The patient is a 55 year old male patient of Dr. Moraes who is an everyday     smoker, smoking about a pack of cigarettes a day and has COPD with emphysema who    presents for TeleHealth follow up visit today. The patient states that he was     seen in the ER on 2020 due to having chest pain and chest discomfort and     having a cough, shortness of breath and wheezing.  The patient states that he     was told he had pneumonia and pleurisy and was prescribed Levaquin.  The patient    states he took Levaquin and unfortunately 2-3 of the tablets fell into the     garbage can and he was not able to finish his course and he was needing our     office to resend over three tablets of Levaquin for him to be able to finish his    seven days of treatment. The patient did have a chest x-ray in the ER on     2020 and it did show mild patchy airspace changes present within the right    lung base likely related to a mild pneumonia.  The patient states that since      hospital stay he is feeling somewhat better. The patient states he is still     having a productive cough and is still having some shortness of breath and     wheezing.  The patient states he is taking Spiriva and Symbicort everyday as     prescribed and uses albuterol inhaler as needed.  The patient states he use to     have a nebulizer machine and unfortunately he lost his nebulizer machine when he    moved and is needing our office to write a prescription for a new nebulizer     machine.  The patient currently denies any fever, chills, night sweats,     hemoptysis, swollen glands in the head and neck,  chest pain, chest tightness,     abdominal pain, nausea, vomiting, diarrhea.  The patient denies any headaches,     myalgias, sore throat, changes in sense of taste and/or smell or any other     coronavirus or flu-like symptoms.  The patient states that he did have a COVID19    test back on 2020 and it did come back not detected. The patient states he    is able to perform his ADLs.            I have personally reviewed the review of systems, past family, social, surgical     and medical histories and I agree with those as entered in the chart.              Physical exam deferred due to TeleHealth visit.              I reviewed patient's chest x-ray dated for 2020.              I reviewed Dr. Moraes's last office visit note.                     [facility]                PACS RADIOLOGY REPORT            Patient: STEPHANE MCKEON   Acct: #[id number]   Report: #[id number]            UNIT #: [id number]    DOS: 20 0942      INSURANCE:BLUE MEDICAID   ORDER #:[id number]      LOCATION:ER     : 1965            PROVIDERS      ADMITTING:     ATTENDING:       FAMILY:     ORDERING:  STAR FLORIAN         OTHER:    DICTATING:  Bobbi Santoro MD            REQ #:[id number]   EXAM:CXR1 - CHEST 1 View AP PA      REASON FOR EXAM:  Chest Pain       REASON FOR VISIT:  ANNEX-HEART COMPLICATIONS            *******Signed******         PROCEDURE:   CHEST AP/PA SINGLE VIEW             COMPARISON:   [facility], , CHEST AP/PA 1 VIEW, 4/06/2016,     13:54.             INDICATIONS:   RIGHT LOWER Chest Pain/POST NEEDLE ABRASION 3 WEEKS AGO             FINDINGS:         The lungs are adequately expanded.  Patchy airspace changes are present within     the right lung base.        The left lung appears clear.  No pneumothorax.  No significant pleural     effusion.. The pulmonary       vasculature appears within normal limits. The cardiac and mediastinal contours     are within normal       limits. The osseous structures appear intact.             CONCLUSION:         Mild patchy airspace changes are present within the right lung base, likely     related to a mild       pneumonia.  If there has been recent history of biopsy as per clinical history,     atelectasis and a       small amount of pulmonary hemorrhage possibly related to biopsy changes is also     in the       differential.  No evidence of pneumothorax.                LEA DE LA ROSA MD             Electronically Signed and Approved By: LEA DE LA ROSA MD on 12/09/2020 at 10:32                               Until signed, this is an unconfirmed preliminary report that may contain      errors and is subject to change.                                              KOGIL:      D:12/09/20 1032            .              Allergies and Medications      Allergies:        Coded Allergies:             NO KNOWN DRUG ALLERGIES (Verified  Allergy, Unknown, 12/8/20)      Medications    Last Reconciled on 12/16/20 13:38 by BALTAZAR MUNGUIA,       levoFLOXacin (levoFLOXacin) 500 Mg Tablet      500 MG PO QDAY for 3 Days, #3 TAB         [facility]         12/16/20       Nicotine Polacrilex (Nicorette) 2 Mg Gum      2 MG PO Q4-6H, #180 BOX         Prov: [facility]         12/16/20        Nicotine 21 Mg Patch (Nicoderm 21 Mg Patch) 1 Each Patch.td24      21 MG TRANSDERM QDAY for 30 Days, #30 PATCH         Prov: BALTAZAR MUNGUIA [facility]         12/16/20       Albuterol/Ipratropium (Duoneb) 3 Ml Ampul.neb      3 ML INH Q4H PRN for SHORTNESS OF BREATH, #120 NEB 5 Refills         Prov: BALTAZAR MUNGUIA [facility]         12/16/20       hydrOXYzine HCL (hydrOXYzine HCL) 50 Mg Tablet      50 MG PO TID, #90 TAB 0 Refills         Reported         12/16/20       Budesonide/Formoterol Fumarate (Symbicort 160/4.5 Mcg) Unknown Strength Inh      INH RTBID, #1 INH 0 Refills         Reported         12/8/20       Aspirin Chew (Aspirin Baby) 81 Mg Tab.chew      81 MG PO QDAY, #30 TAB.CHEW 0 Refills         Reported         12/8/20       (nebulizer tubing)   No Conflict Check      #1         [facility]         2/3/20       Tiotropium Bromide (Spiriva Respimat 2.5 mcg/Puff) 4 Gm Mist.inhal               Prov: [facility]         1/24/20       MDI-Albuterol (Proair HFA) 8.5 Gm Hfa.aer.ad      2 PUFFS INH Q4-6H PRN for SHORTNESS OF BREATH, #1 MDI 5 Refills         Prov: BALTAZAR MUNGUIA [facility]         11/7/19       Rosuvastatin Calcium (Crestor*) 40 Mg Tablet      40 MG PO HS, #30 TAB 0 Refills         Reported         11/7/19       Omeprazole (priLOSEC) 10 Mg Capsule.dr      20 MG PO QDAY, CAP         Reported         11/7/19       NEB-Albuterol Sulf (Albuterol) 2.5 Mg/3 Ml Vial.neb      2.5 MG INH Q4H PRN for SHORTNESS OF BREATH, #120 NEB 0 Refills         Prov: BALTAZAR MUNGUIA [facility]         11/7/19       Prazosin HCl (Prazosin HCl) 2 Mg Capsule      2 MG PO QDAY, #60 CAP 0 Refills         Reported         7/1/19       Nitroglycerin (Nitrostat*) 0.4 Mg Tablet      0.4 MG SL ASDIR, #25 TAB 1 Refill         Reported         6/29/17       QUEtiapine XR (SEROquel XR) 300 Mg Tab.er.24h      300 MG PO HS for 30 Days, #30 TAB.SR         Reported         6/29/17       Metoprolol Tartrate (Metoprolol  Tartrate) 25 Mg Tablet      12.5 MG PO BID, #90 TAB 0 Refills         Reported         6/29/17       HYDROcodone-Acetaminophen 5-325 Mg (HYDROcodone-Acetaminophen 5-325 Mg) 1 Each     Tablet      1 TAB PO Q4-6H PRN for BREAKTHROUGH PAIN, TAB 0 Refills         Reported         6/29/17            Assessment      Cough R05, Shortness of Air  R06.02            Plan      Orders:  Phone Eval 11-20 mi [id number]      Instructions      * Chronic conditions reviewed and taken in consideration for today's treatment       plan.      * Plan Of Care: ()      * Patient instructed to seek medical attention urgently for new or worsening       symptoms.      * Patient was educated/instructed on their diagnosis, treatment and medications       today.      * Recommend self monitoring. Instructions given.      * Recommend self quarantine for 14 days.      * Recommend self quarantine until without fever for 72 hours without using fever       reducing medications.      * Recommends over the counter medications for symptom management.            ASSESSMENT:       1.  COPD with emphysema with acute exacerbation.      2.  Bacterial pneumonia from unspecified organism.      3.  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 Villa.      4. History of lung nodule, resolved on imaging.      5. Tobacco abuse with cigarettes, ongoing.            PLAN:      1. The patient reports that he was prescribed Levaquin from the ER and     unfortunately he dropped he believes 2-3 tablets in the garbage can and is not     able to finish antibiotic regimen.  I will send Levaquin 500 mg for patient to     take for three days as he misplaced three of his tablets in the garbage can.      2.  The patient to continue Symbicort and Spiriva everyday as prescribed and     rinse his mouth out after each use.      3. The patient to continue albuterol inhaler as needed.      4. I will write patient a prescription for nebulizer  machine and I will also     write prescription for patient to use DuoNebs up to four times a day as needed.      5.  I will repeat chest CT scan again in six weeks to ensure resolution of     pneumonia.      6.  I spent five minutes today counseling the patient on smoking cessation.  I     counseled the patient on the risks of continued smoking including the risk of     lung cancer, head and neck cancer, renal cancer, heart disease, stroke, and     early death. Nicotine gum and nicotine patches sent to the pharmacy. The patient     is also advised to decrease the number of cigarettes he is smoking up to the     point to where he can quit.      7.  Patient is advised to call the office, 911 or go to the ER with any new or     worsening symptoms.      8.  The patient reports he is up-to-date with his flu and pneumonia vaccines.      9. Follow up with TeleHealth visit with myself next week and then follow up with     Dr. Simpson in six weeks after repeat chest CT scan.            Electronically signed by BALTAZAR MUNGUIA [facility]  12/21/2020 11:30       Disclaimer: Converted document may not contain table formatting or lab diagrams. Please see [email or web address] for the authenticated document.

## 2021-05-28 NOTE — PROGRESS NOTES
Patient: HOLLIS MCKEON     Acct: [id number]     Report: #[id number]  UNIT #: [id number]     : 1965    Encounter Date:2020  PRIMARY CARE: Eugenio Espionza  ***Signed***  --------------------------------------------------------------------------------------------------------------------  TELEHEALTH NOTE      History of Present Illness      Chief Complaint: 4 month follow up            Hollis Mckeon is presenting for evaluation via Telehealth visit. Verbal     consent obtained before beginning visit.            Provider spent 11 minutes with the patient during telehealth visit.            The following staff were present during the visit: Rosy Banuelos MA, Lexii Moraes DO            The patient is a 55 year old current every day smoker of cigarettes, he has     smoked 1.5 pack per day for the past 45 years. He has no specific complaints. He    is maintained on spiriva 2.5 and Symbicort 160 2 puffs twice daily. He cannot     quantify how much he uses his rescue but says he probably uses it more than he     should. He has no interest in quitting smoking. He has been referred to GI     physician who has asked for the patient to have an EGD. The patient was also     found to have a subcapsular liver lesion measuring 1.7 X 1.1 cm abutting the     right hepatic lobe. It was felt to be a possible lipoma or pseudo-lipoma. The     patient states he is aware of this finding and he is following up Dr. Villa but    has postponed the EGD secondary to COVID-19 pandemic. He has not required any     steroids for his breathing, no hospitalizations and is generally in good health     and denies complaints.             I personally reviewed her CT scan of the chest from 19 and the 5 mm nodule    had resolved.                      [facility]                PACS RADIOLOGY REPORT            Patient: HOLLIS MCKEON   Acct: #[id number]    Report: #[id number]            UNIT #: [id number]    DOS: 20       INSURANCE:BLUE MEDICAID   ORDER #:[id number]      LOCATION:CT     : 1965            PROVIDERS      ADMITTING:     ATTENDING: BALTAZAR MUNGUIA      FAMILY:  KATHY SALDANA   ORDERING:  BALTAZAR MUNGUIA         OTHER:    DICTATING:  JOBY PRICE MD            REQ #:[id number]   EXAM:ABDW - CT ABDOMEN with CONTRAST      REASON FOR EXAM:  ABN CT      REASON FOR VISIT:  ABN CT            *******Signed******         PROCEDURE:   CT ABDOMEN WITH INTRAVENOUS CONTRAST             COMPARISON:   [facility], CT, ABD PEL W/O CONTRAST,     2018, 7:20.  [facility], CT, CHEST W/O CONTRAST, 2019, 13:17.  [facility], CT, ABD       PEL W/O CONTRAST, 2019, 12:54.             INDICATIONS:   GENERALIZED ABDOMINAL PAIN X SEVERAL WEEKS. ABNORMAL X RAY             TECHNIQUE:   After obtaining the patient's consent, CT images were created with     non-ionic intravenous       contrast material.               PROTOCOL:     Standard imaging protocol performed                RADIATION:     DLP: 863 mGy*cm          Automated exposure control was utilized to minimize radiation dose.       CONTRAST:   100 cc Isovue 370 I.V.             FINDINGS:         The lung bases are clear.  Heart size normal.  No pericardial effusion or     pleural effusion.             The liver again demonstrates small fat containing subcapsular lesion abutting     the right hepatic       lobe which measures 1.7 x 1.1 cm on image 23, stable.  No new liver lesion.  No     intrahepatic       biliary dilatation.  Gallbladder present.  No pericholecystic inflammation.      Normal adrenal glands.        Pancreas without findings of pancreatitis.  Kidneys demonstrate symmetric     enhancement.  There is       no obstructing renal or proximal ureteral calculus.  The distal ureters and     bladder are not  within       the field of view of the exam.             No abnormally dilated loops of bowel.  The portal vein, splenic vein and     superior mesenteric vein       are patent.  The visualized abdominal aortic branch vasculature is patent.      Atherosclerotic       calcifications noted at the iliac vessels.  No acute osseous abnormality.             CONCLUSION:         1. No acute abnormality in the abdomen.      2. Subcapsular 1.7 cm fat containing lesion adjacent to the right hepatic lobe     is stable and again       could relate to lipoma or pseudolipoma, consider 6 month follow-up to ensure     stability as this was       a new finding on the prior exam from December 2019.              JOBY PRICE MD             Electronically Signed and Approved By: JOBY PRICE MD on 1/29/2020 at 13:53                        Until signed, this is an unconfirmed preliminary report that may contain      errors and is subject to change.                                              BIANCA:      D:01/29/20 1353                         Past Med History      Past Med History: COPD            Current Smoker: Pt started smoking at 11 years old, 1 1/2 ppd x 44 years            Flu and Pneumonia Vaccines: Current      Overview of Symptoms      Pt complains of: No complaints            Pt Denies: SOA, wheezing, Cough, fever, chills, nausea, vomiting            Allergies/Medications      Allergies:        Coded Allergies:             NO KNOWN ALLERGIES (Unverified , 1/24/20)      Medications    Last Reconciled on 5/14/20 14:09 by ETHAN REILLY,       Tiotropium Bromide (Spiriva Respimat 2.5 mcg/Puff) 4 Gm Mist.inhal      2 PUFFS INH RTQDAY, #1 MDI 5 Refills         Prov: ETHAN REILLY         5/14/20       Budesonide/Formoterol Fumarate (Symbicort 160/4.5 Mcg) 10.2 Gm Inh      2 PUFF INH RTBID, #1 INH 0 Refills         Prov: ETHAN REILLY         5/14/20       MDI-Albuterol (Ventolin HFA) 8 Gm Hfa.aer.ad      2 PUFFS INH Q4-6H PRN for  SHORTNESS OF BREATH, #1 MDI 3 Refills         Prov: ETHAN REILLY         5/14/20       Omeprazole (Omeprazole*) 40 Mg Capsule      40 MG PO QDAY, #30 CAP 0 Refills         Reported         5/14/20       Aspirin Chew (Aspirin Baby) 81 Mg Tab.chew      81 MG PO QDAY, #30 TAB.CHEW 0 Refills         Reported         5/14/20       (nebulizer tubing)   No Conflict Check      #1         Prov: BALTAZAR MUNGUIA [facility]         2/3/20       Tiotropium Bromide (Spiriva Respimat 2.5 mcg/Puff) 4 Gm Mist.inhal               [facility]: [facility]         1/24/20       MDI-Albuterol (Proair HFA) 8.5 Gm Hfa.aer.ad      2 PUFFS INH Q4-6H PRN for SHORTNESS OF BREATH, #1 MDI 5 Refills         Prov: BALTAZAR MUNGUIA [facility]         11/7/19       Rosuvastatin Calcium (Crestor*) 40 Mg Tablet      40 MG PO HS, #30 TAB 0 Refills         Reported         11/7/19       Omeprazole (priLOSEC) 10 Mg Capsule.dr      20 MG PO QDAY, CAP         Reported         11/7/19       NEB-Albuterol Sulf (Albuterol) 2.5 Mg/3 Ml Vial.neb      2.5 MG INH Q4H PRN for SHORTNESS OF BREATH, #120 NEB 0 Refills         Prov[facility]GUIA [facility]         11/7/19       Lisinopril* (Lisinopril*) 10 Mg Tablet      10 MG PO QDAY, #30 TAB 0 Refills         Reported         7/1/19       Prazosin HCl (Prazosin HCl) 2 Mg Capsule      2 MG PO QDAY, #60 CAP 0 Refills         Reported         7/1/19       Nitroglycerin (Nitrostat*) 0.4 Mg Tablet      0.4 MG SL ASDIR, #25 TAB 1 Refill         Reported         6/29/17       QUEtiapine XR (SEROquel XR) 300 Mg Tab.er.24h      300 MG PO HS for 30 Days, #30 TAB.SR         Reported         6/29/17       Metoprolol Tartrate (Metoprolol Tartrate) 25 Mg Tablet      12.5 MG PO BID, #90 TAB 0 Refills         Reported         6/29/17       HYDROcodone-Acetaminophen 5-325 Mg (HYDROcodone-Acetaminophen 5-325 Mg) 1 Each     Tablet      1 TAB PO Q4-6H PRN for BREAKTHROUGH PAIN, TAB 0 Refills         Reported         6/29/17             Plan/Instructions      Ambulatory Assessment/Plan:        Notes      New Medications      * Aspirin Chew (Aspirin Baby) 81 MG TAB.CHEW: 81 MG PO QDAY #30         Replaced Aspirin 325 MG TABLET:         325 MG PO QDAY #100      * Omeprazole (Omeprazole*) 40 MG CAPSULE: 40 MG PO QDAY #30      Renewed Medications      * MDI-Albuterol (Ventolin HFA) 8 GM HFA.AER.AD: 2 PUFFS INH Q4-6H PRN SHORTNESS       OF BREATH #1      * Budesonide/Formoterol Fumarate (Symbicort 160/4.5 Mcg) 10.2 GM INH: 2 PUFF INH      RTBID #1      * TIOTROPIUM BROMIDE (Spiriva Respimat 2.5 mcg/Puff) 4 GM MIST.INHAL: 2 PUFFS       INH RTQDAY #1      Plan/Instructions      * Plan Of Care: ()            * Chronic conditions reviewed and taken into consideration for today's treatment       plan.      * Patient instructed to seek medical attention urgently for new or worsening       symptoms.      * Patient was educated/instructed on their diagnosis, treatment and medications       prior to discharge from the clinic today.            ASSESSMENT:      1. Chronic obstructive pulmonary disease with emphysema without acute     exacerbation.       2. Ongoing tobacco abuse of cigarettes unwilling to quit.       3. Liver lesion question lipoma versus pseudo-lipoma following with Dr. Villa.       4. History of lung nodule resolved on imaging.             PLAN:      1. I have counseled the patient for 2 minutes on the risks of continued smoking     including the risks of lung cancer, head and neck cancer, renal cancer, heart     disease, stroke and early death. He does not qualify for low dose CT scan of the     chest because he is not 57 years old. I did tell him when he is 57 we will     repeat CT scan of the chest unless his symptoms worsen or the spot on his liver     becomes something pathologic or concerning.       2.  I asked the patient to follow up with Dr. Villa and reschedule his EGD once     he feels safe.       3. I refilled all his  prescriptions today including his Symbicort, spiriva and     albuterol inhaler.       4. The patient states he has trouble getting his parts for his nebulizer so he     does not use it. He says his insurance does not cover specific parts for the     nebulizer.       5. He is up to date on all vaccines.       6. Follow up in 6 months.      Codes:  Phone Eval 11-20 mi [id number]            Electronically signed by ETHAN REILLY  06/09/2020 11:17       Disclaimer: Converted document may not contain table formatting or lab diagrams. Please see [employer] for the authenticated document.

## 2021-05-28 NOTE — PROGRESS NOTES
Patient: STEPHANE MCKEON     Acct: [id number]     Report: #[id number]  UNIT #: [id number]     : 1965    Encounter Date:2020  [facility] C[facility]: [facility]  ***Signed***  --------------------------------------------------------------------------------------------------------------------  Chief Complaint      Encounter Date      2020            Primary Care Provider      EUGENIO SALDANA            Referring Provider      KARMEN TORRES            Patient Complaint      Patient is complaining of      Patient here today for f/u, COPD            VITALS      Height 72 in / 182.88 cm      Weight 202 lbs  / 91.116280 kg      BSA 2.14 m2      BMI 27.4 kg/m2      Temperature 98 F / 36.67 C - Oral      Pulse 73      Respirations 14      Blood Pressure 111/71 Sitting, Left Arm      Pulse Oximetry 95%, room air            HPI      The patient is a 54 year old  male, patient of Dr. Moraes's with a     history of pulmonary nodule and COPD. The patient is here for follow up today.      The patient had a repeat CT scan ordered due to having a 5 mm pulmonary nodule.     Chest CT showed mild to moderate emphysematous changes, mild scarring, fibrosis     of the lung apices.  No suspicious pulmonary nodule or mass.  The patient states    that he has shortness of breath and is only able to walk about 500 feet before     having to stop and rest.  The patient states that he has intermittent cough and     denies any wheezing. The patient states he is still smoking about one to one and    a half pack per day.  He states he has tried Chantix, Wellbutrin, nicotine     patches and lozenges, however none of it helped him quit. The patient states he     will try to stop smoking on his own.  The patient denies any fever, chills,     night sweats, hemoptysis, purulent sputum production, chest pain, chest     tightness, swollen glands in the head and neck, abdominal pain, nausea, vomiting    or diarrhea.   The patient states he has not had to have any antibiotics or     steroids since last visit. The patient denies any hospitalizations since last     visit.  He states his shortness of air is worse with exertion, moderate in     severity and is improved with rest.  The patient states he is still having to     use his rescue inhaler two times a day.  The patient states he is taking     Symbicort everyday as prescribed.              I have personally reviewed the review of systems, past family, social, surgical     and medical histories and I agree with those as entered in the chart.      Copies To:   ETHAN REILLY      Constitutional:  Denies: Fatigue, Fever, Weight gain, Weight loss, Chills,     Insomnia, Other      Respiratory/Breathing:  Complains of: Shortness of air; Denies: Wheezing, Cough,    Hemoptysis, Pleuritic pain, Other      Endocrine:  Denies: Polydipsia, Polyuria, Heat/cold intolerance, Diabetes, Other      Eyes:  Denies: Blurred vision, Vision Changes, Other      Ears, nose, mouth, throat:  Denies: Congestion, Dysphagia, Hearing Changes, Nose    Bleeding, Nasal Discharge, Throat pain, Tinnitus, Other      Cardiovascular:  Denies: Chest Pain, Exertional dyspnea, Peripheral Edema,     Palpitations, Syncope, Wake up Gasping for air, Orthopnea, Tachycardia, Other      Gastrointestinal:  Denies: Abdominal pain/cramping, Bloody stools, Constipation,    Diarrhea, Melena, Nausea, Vomiting, Other      Genitourinary:  Denies: Dysuria, Urinary frequency, Incontinence, Hematuria,     Urgency, Other      Musculoskeletal:  Denies: Joint Pain, Joint Stiffness, Joint Swelling, Myalgias,    Other      Hematologic/lymphatic:  DENIES: Lymphadenopathy, Bruising, Bleeding tendencies,     Other      Neurologic:  Denies: Headache, Numbness, Weakness, Seizures, Other      Psychiatric:  Denies: Anxiety, Appropriate Effect, Depression, Other      Sleep:  No: Excessive daytime sleep, Morning Headache?, Snoring,  Insomnia?, Stop    breathing at sleep?, Other      Integumentary:  Denies: Rash, Dry skin, Skin Warm to Touch, Other            FAMILY/SOCIAL/MEDICAL HX      Surgical History:  Yes: Other Surgeries      Is Mother Still Living?:  Yes       Family History:  Unknown (adopted)      Smoking status:  Current every day smoker (1.5 ppd x 40 years)      Anticoagulation Therapy:  No      Antibiotic Prophylaxis:  No      Medical History:  Yes: Allergies, Chronic Bronchitis/COPD (INHALER), Depression,    Anxiety, Bipolar Disorder, PTSD, Heart Attack (2013- 2 STENTS PLACED SEES DR. OATES STRESS TEST 5/2017-OK), High Blood Pressure, High Cholesterol, Reflux     Disease, Shortness Of Breath; No: Blood Disease, Chemotherapy/Cancer, Deafness     or Ringing Ears, Seizures, Hemorrhoids/Rectal Prob, Sinus Trouble, Miscellaneous    Medical/oth      Psychiatric History      depression and anxiety            PREVENTION      Hx Influenza Vaccination:  Yes      Date Influenza Vaccine Given:  Sep 1, 2019      Influenza Vaccine Declined:  No      2 or More Falls Past Year?:  No      Fall Past Year with Injury?:  No      Hx Pneumococcal Vaccination:  Yes      Encouraged to follow-up with:  PCP regarding preventative exams.      Chart initiated by      Melody Jo CMA            ALLERGIES/MEDICATIONS      Allergies:        Coded Allergies:             NO KNOWN ALLERGIES (Unverified , 1/24/20)      Medications    Last Reconciled on 1/24/20 10:33 by BALTAZAR MUNGUIA,       Tiotropium Bromide (Spiriva Respimat 2.5 mcg/Puff) 4 Gm Mist.inhal      2 PUFFS INH RTQDAY, #1 MDI 5 Refills         Prov: BALTAZAR MUNGUIA [facility]         1/24/20       MDI-Albuterol (Proair HFA) 8.5 Gm Hfa.aer.ad      2 PUFFS INH Q4-6H PRN for SHORTNESS OF BREATH, #1 MDI 5 Refills         Prov: BALTAZAR MUNGUIA [facility]         11/7/19       Rosuvastatin Calcium (Crestor*) 40 Mg Tablet      40 MG PO HS, #30 TAB 0 Refills         Reported         11/7/19       Omeprazole  (priLOSEC) 10 Mg Capsule.dr      20 MG PO QDAY, CAP         Reported         11/7/19       NEB-Albuterol Sulf (Albuterol) 2.5 Mg/3 Ml Vial.neb      2.5 MG INH Q4H PRN for SHORTNESS OF BREATH, #120 NEB 0 Refills         Prov: BALTAZAR MUNGUIA [facility]         11/7/19       Budesonide/Formoterol Fumarate (Symbicort 160/4.5 Mcg) 10.2 Gm Inh      2 PUFF INH RTBID, #1 INH 0 Refills         Prov: [facility]         11/7/19       MDI-Albuterol (Ventolin HFA) 8 Gm Hfa.aer.ad      2 PUFFS INH Q4-6H PRN for SHORTNESS OF BREATH, #1 MDI 3 Refills         Prov: ETHAN REILLY         9/20/19       Lisinopril* (Lisinopril*) 10 Mg Tablet      10 MG PO QDAY, #30 TAB 0 Refills         Reported         7/1/19       Prazosin HCl (Prazosin HCl) 2 Mg Capsule      2 MG PO QDAY, #60 CAP 0 Refills         Reported         7/1/19       Nitroglycerin (Nitrostat*) 0.4 Mg Tablet      0.4 MG SL ASDIR, #25 TAB 1 Refill         Reported         6/29/17       QUEtiapine XR (SEROquel XR) 300 Mg Tab.er.24h      300 MG PO HS for 30 Days, #30 TAB.SR         Reported         6/29/17       raNITIdine HCL (raNITIdine HCL) 150 Mg Tablet      150 MG PO HS, #30 TAB 0 Refills         Reported         6/29/17       Aspirin (Aspirin) 325 Mg Tablet      325 MG PO QDAY, #100 TAB 0 Refills         Reported         6/29/17       Metoprolol Tartrate (Metoprolol Tartrate) 25 Mg Tablet      12.5 MG PO BID, #90 TAB 0 Refills         Reported         6/29/17       HYDROcodone-Acetaminophen 5-325 Mg (HYDROcodone-Acetaminophen 5-325 Mg) 1 Each     Tablet      1 TAB PO Q4-6H PRN for BREAKTHROUGH PAIN, TAB 0 Refills         Reported         6/29/17      Current Medications      Current Medications Reviewed 1/24/20            EXAM      VITAL SIGNS:  Reviewed.        GENERAL: Obese, well developed, well nourished, in no acute distress.        NECK:  Supple without tracheal deviation or lymphadenopathy.  No thyromegaly     appreciated.      LYMPHATICS:  No cervical or  supraclavicular lymphadenopathy.      HEENT: Pupils are equal, round and reactive to light. There is no scleral     icterus.  Nares patent without hypertrophy of the turbinates. No erythema of the    passages.  TMs are clear bilaterally with good cone of light. The posterior     pharynx is without  lesions or erythema.      RESPIRATORY:  Decreased breath sounds throughout, no wheezes, rales or rhonchi     appreciated, normal work of breathing noted, patient able to speak full     sentences without difficulty.       CARDIOVASCULAR:  Regular rate and rhythm.  No murmurs, gallops or rubs.  No     lower extremity edema.  Equal radial pulses.        GI: Soft, nontender, nondistended, no organomegaly.  Bowel sounds present in all    four quadrants.      MUSCULOSKELETAL:  No joint effusions, erythema or warmth over the major joint     systems.      SKIN:  No rashes or lesions.      NEUROLOGIC: Cranial nerves II-XII are intact bilaterally.  Moves all     extremities. Ambulates with ease.      PSYCH:  Appropriate mood and affect.      Vitals      Vitals:             Height 72 in / 182.88 cm           Weight 202 lbs  / 91.237691 kg           BSA 2.14 m2           BMI 27.4 kg/m2           Temperature 98 F / 36.67 C - Oral           Pulse 73           Respirations 14           Blood Pressure 111/71 Sitting, Left Arm           Pulse Oximetry 95%, room air            REVIEW      Results Reviewed      [facility] Results Reviewed?:  Yes Prev Lab Results, Yes Prev Radiology Results, Yes     Previous [facility] Records      Lab Results      I reviewed patient's noncontrast chest CT dated for 12/20/2019.            Assessment      Abnormal CT scan - R93.89            Abnormal chest CT - R93.89            Notes      New Medications      * TIOTROPIUM BROMIDE (Spiriva Respimat 2.5 mcg/Puff) 4 GM MIST.INHAL: 2 PUFFS       INH RTQDAY #1      * TIOTROPIUM BROMIDE (Spiriva Respimat 2.5 mcg/Puff) 4 GM MIST.INHAL          Sample - Qty 2       Discontinued Medications      * Nicotine Polacrilex (Nicotine) 2 MG LOZENGE: 2 MG BUCCAL Q4-6H #180      * Nicotine 21 Mg Patch 1 EACH PATCH.TD24: 21 MG TRANSDERM QDAY #42      New Diagnostics      * ABDOMEN W/WO CONTRAST CT, SCHEDULED PROCEDURE         Dx: Abnormal chest CT - R93.89      New Referrals      * Gastroenterology, SCHEDULED PROCEDURE         Status changed from Active to Complete.         Dx: Abnormal CT scan - R93.89      IMPRESSION:      1.  COPD without acute exacerbation.      2.  5 mm lung nodule incidentally noted on abdominopelvic CT scan, not present     on recent chest CT scan.      3.  1.4 cm density long the capsule of the superior right hepatic lobe that was     new in interval found on recent chest CT.        4. Ongoing tobacco abuse with cigarettes, smoking 1.5 packs per day currently.            5. BMI 27.4.              PLAN:      1.  Patient to continue Symbicort as prescribed and rinse his mouth out after     each use.  The patient is still having to use his rescue inhaler twice a day. We    will add on Spiriva 2.5 mcg two puffs once a day.  Patient to continue albuterol    as needed. Patient is advised to notify our office if Spiriva is not covered so     we can send something else over.      2.  Repeat chest CT showed no suspicious pulmonary nodule or mass.        3. Chest CT also showed an incidental finding of a 1.4 cm rounded fat density     along the capsule of the superior right hepatic lobe that was new in interval.      We will order a CT of the abdomen and pelvis and refer patient to GI for further    evaluation.      4.  Patient is to call the office, 911 or go to the ER with any new or worsening    symptoms.      5.  I spent four minutes today counseling the patient on smoking cessation.  I     counseled the patient on the risks of continued smoking including the risk of     lung cancer, head and neck cancer, renal cancer, heart disease, stroke, and     early death.  Patient refuses nicotine therapy or pharmacotherapy at this time.     The patient is advised to decrease the number of cigarettes he is smoking up to     the point where he can quit.      6.  The patient is up to date on flu and pneumonia vaccines.        7.  I spent three minutes counseling patient on diet and exercise. Patient's BMI    and weight were discussed.  The patient was counseled on initiating and     intensifying attempts to lose weight.  Patient was counseled about the risks of     obesity and advised to decrease caloric intake by 500 calories a day, eat three     small meals a day and advised to minimize snacking.  I recommended 30-60 minutes    of daily exercise.  Patient refuses referral to dietician.      8.  Follow up in 3-4 months with Dr. Moraes, sooner if needed.            Patient Education      ACO BMI High above 25:  Counseling Given, Encouraged weight loss, Encourage     dietary changes      Tobacco Cessation Counseling:  for 3 - 10 minutes      Patient Education Provided:  COPD, Smoking Cessation      Time Spent:  > 50% /Coord Care            Electronically signed by BALTAZAR MUNGUIA [facility]  01/31/2020 11:55       Disclaimer: Converted document may not contain table formatting or lab diagrams. Please see [employer] for the authenticated document.

## 2021-05-28 NOTE — PROGRESS NOTES
Patient: STEPHANE MCKEON     Acct: [id number]     Report: #[id number]  UNIT #: [id number]     : 1965    Encounter Date:2021  PRIMARY CARE: Eugenio Espinoza \[facility]\""  ***Signed***  --------------------------------------------------------------------------------------------------------------------  Chief Complaint      Encounter Date      2021            Primary Care Provider      Eugenio Espinoza \[facility]\""            Referring Provider      CARLOS OVALLES            Patient Complaint      Patient is complaining of      Pt here for 1w f/u, PFT results, COPD            VITALS      Height 6 ft 0 in / 182.88 cm      Weight 185 lbs 0 oz / 83.166813 kg      BSA 2.06 m2      BMI 25.1 kg/m2      Temperature 96.1 F / 35.61 C - Temporal      Pulse 92      Respirations 18      Blood Pressure 114/70 Sitting, Left Arm      Pulse Oximetry 98%, Room air            HPI      The patient is a 55 year old male patient of Dr. Simpson with COPD, emphysema, new    right hilar mass measuring 3.8 cm in size, liver lesion, questionable lipoma     versus pseudolipoma, history of lung nodule, tobacco abuse with cigarettes and     weight loss.  The patient presents for follow up visit today. The patient did     have a PET scan completed as it was concerning for malignancy and it did show     marked activity in the mouth probably due to muscular activity, direct     visualization is recommended. PET scan also showed a 3.8 cm lobulated     hypermetabolic mass, right hilum. Patient states he has already been referred to    ENT.  The patient also underwent a bronchoscopy with endobronchial/endobronchial    ultrasound with Dr. Medina on 2021 and pathology unfortunately came back     positive for squamous cell carcinoma. The patient's bronchoalveolar lavage and     bronchial washings also came back positive for strep pneumoniae and     streptococcus agalacticae group B.  Fungal culture and AFB show no growth to     date. The patient  states that since last office visit he was seen in the ER     yesterday due to having some left sided chest pain. The patient states that he     was diagnosed with pneumonia and started on Levaquin 500 mg to take for ten     days.  The patient states that he just started taking antibiotics.  The patient     currently denies any fever, chills, night sweats, hemoptysis, swollen glands in     the head and neck,  chest pain, chest tightness, abdominal pain, nausea,     vomiting, diarrhea.  The patient denies any headaches, myalgias, changes in     sense of taste and/or smell, visual changes, hearing changes, headaches,     syncope.  The patient however states that he does sweat more often now and has     had unintentional weight loss.  The patient states he is still smoking one pack     of cigarettes a day and does have nicotine patches and gum at home and will     start using them and will try to quit.  The patient states he is currently     taking Symbicort everyday as prescribed and uses albuterol inhaler as needed.     The patient is taking Symbicort and Spiriva everyday as prescribed and using     albuterol inhaler as needed. The patient states he is able to perform his ADLs.            I have personally reviewed the review of systems, past family, social, surgical     and medical histories and I agree with those as entered in the chart.      Copies To:   Rome Simpson      Constitutional:  Denies: Fatigue, Fever, Weight gain, Weight loss, Chills,     Insomnia, Other      Respiratory/Breathing:  Complains of: Shortness of air, Wheezing, Cough; Denies:    Hemoptysis, Pleuritic pain, Other      Endocrine:  Denies: Polydipsia, Polyuria, Heat/cold intolerance, Diabetes, Other      Eyes:  Denies: Blurred vision, Vision Changes, Other      Ears, nose, mouth, throat:  Denies: Congestion, Dysphagia, Hearing Changes, Nose    Bleeding, Nasal Discharge, Throat pain, Tinnitus, Other      Cardiovascular:  Denies:  Chest Pain, Exertional dyspnea, Peripheral Edema,     Palpitations, Syncope, Wake up Gasping for air, Orthopnea, Tachycardia, Other      Gastrointestinal:  Denies: Abdominal pain/cramping, Bloody stools, Constipation,    Diarrhea, Melena, Nausea, Vomiting, Other      Genitourinary:  Denies: Dysuria, Urinary frequency, Incontinence, Hematuria,     Urgency, Other      Musculoskeletal:  Denies: Joint Pain, Joint Stiffness, Joint Swelling, Myalgias,    Other      Hematologic/lymphatic:  DENIES: Lymphadenopathy, Bruising, Bleeding tendencies,     Other      Neurologic:  Denies: Headache, Numbness, Weakness, Seizures, Other      Psychiatric:  Denies: Anxiety, Appropriate Effect, Depression, Other      Sleep:  No: Excessive daytime sleep, Morning Headache?, Snoring, Insomnia?, Stop    breathing at sleep?, Other      Integumentary:  Denies: Rash, Dry skin, Skin Warm to Touch, Other            FAMILY/SOCIAL/MEDICAL HX      Surgical History:  Yes: Other Surgeries       Family History:  Unknown (Adopted)      Smoking status:  Current every day smoker (1.5 ppd x 40 years, 1 ppd currently)      Anticoagulation Therapy:  No      Antibiotic Prophylaxis:  No      Medical History:  Yes: Arthritis (BACK), Chronic Bronchitis/COPD (INHALER),     Depression, Anxiety, Bipolar Disorder, PTSD, Heart Attack (2013- 2 STENTS PLACED    SEES DR. OATES STRESS TEST 5/2017-OK), High Blood Pressure, Reflux Disease,     Shortness Of Breath; No: Blood Disease, Chemotherapy/Cancer, Deafness or Ringing    Ears, Seizures, Hemorrhoids/Rectal Prob, Sinus Trouble, Miscellaneous     Medical/oth      Psychiatric History      Depression, anxiety, bipolar, PTSD            PREVENTION      Hx Influenza Vaccination:  Yes      Date Influenza Vaccine Given:  Dec 1, 2020      Influenza Vaccine Declined:  No      2 or More Falls in Past Year?:  No      Fall Past Year with Injury?:  No      Hx Pneumococcal Vaccination:  Yes      Encouraged to follow-up with:  PCP  regarding preventative exams.      Chart initiated by      Dian Lopez MA            ALLERGIES/MEDICATIONS      Allergies:        Coded Allergies:             NO KNOWN DRUG ALLERGIES (Verified  Allergy, Unknown, 2/4/21)      Medications    Last Reconciled on 2/4/21 12:02 by KEON REYES-Ipratropium/Albuterol (Combivent Respimat) 4 Gm Inh      1 PUFF INH RTQID, #1 INH         Reported         2/4/21       Lisinopril* (Lisinopril*) 5 Mg Tablet      MG PO HS, #30 TAB 0 Refills         Reported         1/29/21       HYDROcodone-Acetaminophen 7.5-325 Mg (HYDROcodone-Acetaminophen 7.5-325 Mg) 1     Tab Tablet      1 TAB PO Q8H PRN for PAIN, TAB         Reported         1/29/21       MDI-Albuterol (Proair HFA) 8.5 Gm Hfa.aer.ad      2 PUFFS INH Q4-6H PRN for SHORTNESS OF BREATH, #1 MDI 5 Refills         Prov: Rome Simpson         1/28/21       hydrOXYzine HCL (hydrOXYzine HCL) 50 Mg Tablet      80 MG PO TID, #90 TAB 0 Refills         Reported         12/16/20       Budesonide/Formoterol Fumarate (Symbicort 160/4.5 Mcg) Unknown Strength Inh      INH RTBID, #1 INH 0 Refills         Reported         12/8/20       Aspirin Chew (Aspirin Baby) 81 Mg Tab.chew      81 MG PO QDAY, #30 TAB.CHEW 0 Refills         Reported         12/8/20       Tiotropium Bromide (Spiriva Respimat 2.5 mcg/Puff) 4 Gm Mist.inhal               Prov: BALTAZAR MUNGUIA [facility]         1/24/20       Rosuvastatin Calcium (Crestor*) 40 Mg Tablet      40 MG PO HS, #30 TAB 0 Refills         Reported         11/7/19       Omeprazole (priLOSEC) 10 Mg Capsule.dr      40 MG PO QDAY, CAP         Reported         11/7/19       Prazosin HCl (Prazosin HCl) 2 Mg Capsule      2 MG PO QDAY, #60 CAP 0 Refills         Reported         7/1/19       QUEtiapine XR (SEROquel XR) 300 Mg Tab.er.24h      300 MG PO HS for 30 Days, #30 TAB.SR         Reported         6/29/17       Metoprolol Tartrate (Metoprolol Tartrate) 25 Mg Tablet      25 MG PO BID, #90 TAB 0  Refills         Reported         6/29/17      Current Medications      Current Medications Reviewed 2/4/21            EXAM      Vital Signs Reviewed.      General:  WDWN, Alert, NAD.      HEENT: PERRL, EOMI.  OP, nares clear, no sinus tenderness.      Neck: Supple, no JVD, no thyromegaly.      Lymph: No axillary, cervical, supraclavicular lymphadenopathy noted bilaterally.      Chest:  Decreased breath sounds throughout, no wheezes, rales or rhonchi     appreciated, normal work of breathing noted.  Patient is able to speak full     sentences without difficulty.        CV: RRR, no MGR, pulses 2+, equal.        Abd: Soft, NT, ND, +BS, no HSM.      EXT: No clubbing, no cyanosis, no edema, no joint tenderness.        Neuro:  A  Skin: No rashes or lesions.      Vitals      Vitals:             Height 6 ft 0 in / 182.88 cm           Weight 185 lbs 0 oz / 83.454285 kg           BSA 2.06 m2           BMI 25.1 kg/m2           Temperature 96.1 F / 35.61 C - Temporal           Pulse 92           Respirations 18           Blood Pressure 114/70 Sitting, Left Arm           Pulse Oximetry 98%, Room air            REVIEW      Results Reviewed      PCCS Results Reviewed?:  Yes Prev Lab Results, Yes Prev Radiology Results, Yes     Previous Mecial Records      Lab Results      I reviewed patient's pathology results from bronchoscopy showing squamous cell     carcinoma and patient's microbiology results showing streptococcal pneumoniae     and streptococcus agalacticae group b.            Assessment      Squamous cell carcinoma of lung - C34.90            Notes      New Medications      * MDI-Ipratropium/Albuterol (Combivent Respimat) 4 GM INH: 1 PUFF INH RTQID #1         Instructions: Do not exceed 6 inhalations per day.      New Diagnostics      * Brain W/WO Cont MRI, SCHEDULED PROCEDURE         Dx: Squamous cell carcinoma of lung - C34.90      New Referrals      * Oncology, Routine         Dx: Squamous cell carcinoma of lung - C34.90       ASSESSMENT:       1.  3.8 cm right hilar mass, positive for squamous cell carcinoma.      2.  Bronchoalveolar lavage and bronchial washings, positive for streptococcus     pneumoniae and streptococcus agalacticae group B.      3.  COPD without acute exacerbation.      4. Pneumonia diagnosed at the ER yesterday, patient currently on Levaquin.      5.  Tobacco abuse with cigarettes, ongoing.      6. 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 Villa, did not light up on PET scan.        5. History of lung nodule.      6. Weight loss.            PLAN:      1. The patient to continue Levaquin as prescribed.      2. The patient to continue Symbicort and Spiriva everyday as prescribed and     rinse his mouth out after each use.      3. The patient to continue albuterol inhaler as needed.      4. The patient's pathology results unfortunately came back positive for squamous    cell carcinoma. I will refer patient to oncology as soon as possible and order a    brain MRI with and without contrast. The patient has already had PET scan     completed, see scanned document.      5. The patient had a pulmonary function test that came back showing an FEV1 of     73%.  Six minute walk test did not show any significant desaturations.        6. The patient is up to date on flu and pneumonia vaccines.  The patient is     advised to receive the COVID19 vaccine when it becomes available and to continue    to follow CDC recommendations of social distancing, wearing a mask and washing     hands for at least 20 seconds.        7.  I spent four minutes today counseling the patient on smoking cessation.  I     counseled the patient on the risks of continued smoking including the risk of     lung cancer, head and neck cancer, renal cancer, heart disease, stroke, and     early death. Patient is advised to start nicotine patches and gum and to     decrease the number of cigarettes he is smoking up  until the point where he can     quit.      8.  Patient is advised to call the office, 911 or go to the ER with any new or     worsening symptoms.      9. Follow up with Dr. Simpson in 3-4 weeks, sooner if needed.            Patient Education      Tobacco Cessation Counseling:  for 3 - 10 minutes      Patient Education Provided:  COPD, Lung Cancer, Smoking Cessation      Time Spent:  > 50% /Coord Care            Electronically signed by BALTAZAR MUNGUIA [facility]  02/09/2021 11:01       Disclaimer: Converted document may not contain table formatting or lab diagrams. Please see [employer] for the authenticated document.

## 2021-05-28 NOTE — PROGRESS NOTES
Patient: STEPHANE MCKEON     Acct: [id number]     Report: #[id number]  UNIT #: [id number]     : 1965    Encounter Date:2021  PRIMARY CARE: Eugenio Espinoza  ***Signed***  --------------------------------------------------------------------------------------------------------------------  NURSE INTAKE      Visit Type      Established Patient Visit            Chief Complaint      LUNG CA F/U      Intent of Therapy:  Curative            Referring Provider/Copies To      Referring Provider:  SIOMARA SIU            History and Present Illness      Past Oncology Illness History      Patient presents for discussion of treatment options for his clear cell     carcinoma the lung.  Patient reports that he was diagnosed with pneumonia twice     since 2020.  He was treated with antibiotics.  His cough improved but     did not ever completely go away.  This eventually led to a CT scan which     demonstrated a mass in the right hilar area.  He was then referred to     pulmonology.  He underwent bronchoscopy with biopsy of the right lower lobe     2021 which showed squamous cell carcinoma.  Lymph node sampling was     negative in the 11 L and 7 station but positive in the 11 R station (N1).  He     then underwent PET scan and MRI of the brain. He does have inhalers and     nebulizer for underlying COPD.  He is working toward smoking cessation and is     down from 2.5 packs/day to 1 pack/day.  His PET scan did show some questionable     area in the mouth.  He reports seeing ENT yesterday with a negative evaluation.     21 Dr. Simpson performed Right VATS exploration with posterior lateral     thoracotomy and lower and middle bilobectomy. 21 patient presents with post    op surgical pain and requires addition time for healing before proceeding with     IV chemotherapy. EGFR on the pathology from [facility] =negative. 21 patient     presents to the clinic to discuss adjuvant treatment  options.            HPI - Oncology Interim      Patient returns for follow-up and continued discussion of his lung cancer.  He     is status post right thoracotomy.  He notes that the incisions are now well-    healed.  He continues to have significant pain from the incision sites and     decreased use of the right arm.  He describes it as a burning sensation he     reports some mild shortness of breath with heavier activity but not at baseline.     He denies cough or hemoptysis.  He continues to smoke but is considering     smoking cessation.  He denies masses or adenopathy.  He notes good appetite.            Cancer Details            Squamous cell carcinoma.  Right lower lobe. EGFR negative            Metastatic Sites      Lung            Clinical Staging      T2a, N1, M0 (stage IIb); 2/23/21 Stage IIb (pT2, PN 1, M0) squamous cell     carcinoma with high risk features            Treatments      Surgeries      2/23/21 Right VATS exploration with posterior lateral thoracotomy and lower and     middle bilobectomy            Clinical Trial Participant      No            ECOG Performance Status      0            PAST, FAMILY   Past Medical History      Past Medical History:  COPD, Depression, Heart Attack (2 stents), High     Cholesterol, Mental Disease, Short of Air      Hematology/Oncology (M):  Lung Cancer      Genetic/Metabolic:  None            Past Surgical History      Lung Biopsy            Family History      Family History:  No Family History            Patient is adopted and does not know his biologic family history            Social History      Marital Status:        Lives independently:  Yes      Occupation:  DISABLED            Tobacco Use      Tobacco status:  Current every day smoker (1.5 ppd x 40 years, 1 ppd currently)      Smoking packs/day:  1      Currently Vaping:  No      Smoking history:  > 50 pack years            Alcohol Use      Alcohol intake:  None            Substance Use       Substance use:  Painkillers            REVIEW OF SYSTEMS      General:  Admits: Fatigue, Weight Gain      ENT:  Denies Headache      Cardiovascular:  Admits Chest Pain      Respiratory:  Admits: Cough, Shortness of Air      Musculoskeletal:  Admits Back Pain, Admits Muscle Pain      Neurologic:  Denies Numbness\Tingling      Psychiatric:  Admits Depression            VITAL SIGNS AND SCORES      Vitals      Weight 183 lbs 3.236 oz / 83.1 kg      Temperature 96.7 F / 35.94 C - Temporal      Pulse 75      Respirations 20      Blood Pressure 104/70 Sitting, Left Arm      Pulse Oximetry 100%, rm air            Pain Score      Pain Scale Used:  Numerical      Pain Intensity:  8            Fatigue Score      Fatigue (0-10 scale):  9            Rehab to be Ordered      Type of Referral to be Ordered:  No needs identified            EXAM      General Appearance:  Positive for: Alert, Cooperative;          Negative for: Acute Distress      Eye:  Positive for: Anicteric Sclerae, Moist Conjunctiva      Neck:  Positive for: Supple;          Negative for: JVD, Masses      Respiratory:  Positive for: CTAB, Normal Respiratory Effort      Other      Well-healed right posterior thoracotomy incision      Abdomen/Gastro:  Positive for: Normal Active Bowel Sounds, Soft;          Negative for: Hepatosplenomegaly, Tenderness      Cardiovascular:  Positive for: RRR;          Negative for: Gallop, Murmur, Peripheral Edema, Rub      Psychiatric:  Positive for: Appropriate Affect, Intact Judgement      Lymphatic:  Negative for: Cervical, Infraclavicular, Supraclavicular            PREVENTION      Hx Influenza Vaccination:  Yes      Date Influenza Vaccine Given:  Dec 1, 2020      Influenza Vaccine Declined:  No      2 or More Falls in Past Year?:  No      Fall Past Year with Injury?:  No      Hx Pneumococcal Vaccination:  Yes      Encouraged to follow-up with:  PCP regarding preventative exams.      Chart initiated by      JOSEPH RANDOLPH MA             ALLERGY/MEDS      Allergies      Coded Allergies:             NO KNOWN DRUG ALLERGIES (Verified  Allergy, Unknown, 5/20/21)            Medications      Last Reconciled on 5/20/21 15:07 by FLACO CAMACHO      Dexamethasone (Dexamethasone) 2 Mg Tab      4 MG PO BID for 12 Days, #48 TAB 2 Refills         Prov: FLACO CAMACHO         5/20/21       Prochlorperazine Maleate (Prochlorperazine Maleate) 10 Mg Tab      10 MG PO Q6H PRN for NAUSEA AND/OR VOMITING for 7 Days, #28 TAB 3 Refills         Prov: FLACO CAMACHO         5/20/21       Ondansetron Hcl (ONDANSETRON HCL) 8 Mg Tablet      8 MG PO Q8H PRN for NAUSEA, #30 TAB 3 Refills         Prov: FLACO CAMACHO         5/20/21       DULoxetine (Cymbalta) 30 Mg Capsule.dr      30 MG PO HS, #30 CAP 3 Refills         Prov: FLACO CAMACHO         5/20/21       MDI-Ipratropium/Albuterol (Combivent Respimat) 4 Gm Inh      1 PUFF INH RTQID, #1 INH         Reported         2/4/21       Lisinopril* (Lisinopril*) 5 Mg Tablet      MG PO HS, #30 TAB 0 Refills         Reported         1/29/21       MDI-Albuterol (Proair HFA) 8.5 Gm Hfa.aer.ad      2 PUFFS INH Q4-6H PRN for SHORTNESS OF BREATH, #1 MDI 5 Refills         Prov: Rome Simpson         1/28/21       hydrOXYzine HCL (hydrOXYzine HCL) 50 Mg Tablet      80 MG PO TID, #90 TAB 0 Refills         Reported         12/16/20       Budesonide/Formoterol Fumarate (Symbicort 160/4.5 Mcg) Unknown Strength Inh      INH RTBID, #1 INH 0 Refills         Reported         12/8/20       Aspirin Chew (Aspirin Baby) 81 Mg Tab.chew      81 MG PO QDAY, #30 TAB.CHEW 0 Refills         Reported         12/8/20       Tiotropium Bromide (Spiriva Respimat 2.5 mcg/Puff) 4 Gm Mist.inhal               Prov: BALTAZAR MUNGUIA [facility]         1/24/20       Rosuvastatin Calcium (Crestor*) 40 Mg Tablet      40 MG PO HS, #30 TAB 0 Refills         Reported         11/7/19       Omeprazole (priLOSEC) 10 Mg Capsule.dr      40 MG PO QDAY, CAP         Reported         " 11/7/19       Prazosin HCl (Prazosin HCl) 2 Mg Capsule      2 MG PO QDAY, #60 CAP 0 Refills         Reported         7/1/19       QUEtiapine XR (SEROquel XR) 300 Mg Tab.er.24h      300 MG PO HS for 30 Days, #30 TAB.SR         Reported         6/29/17       Metoprolol Tartrate (Metoprolol Tartrate) 25 Mg Tablet      25 MG PO BID, #90 TAB 0 Refills         Reported         6/29/17      Medications Reviewed:  Changes made to meds            IMPRESSION/PLAN      Diagnosis      Primary cancer of right lower lobe of lung - C34.31      Status post resection.  At his last visit, we discussed adjuvant chemotherapy     with cisplatin plus Taxotere.  He is agreeable to treatment as outlined.  He     will have lab work today to ensure adequate endorgan functions and blood counts.     Prescriptions for Zofran and Compazine provided as needed for nausea.      Dexamethasone prescription to help minimize fluid retention as well as decrease     the risk of chemo related nausea.  For cycle will be planned in the near future.     I will see him back for cycle 2, day 1 with labs prior.            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  Titrate to efficacy.  We discussed procedures such as nerve     block the patient reports that he \"quit pain management\" and does not want to     see them again.            Notes      New Medications      * DULoxetine (Cymbalta) 30 MG CAPSULE.DR: 30 MG PO HS #30      * ONDANSETRON HCL 8 MG TABLET: 8 MG PO Q8H PRN NAUSEA #30      * Prochlorperazine Maleate 10 MG TAB: 10 MG PO Q6H PRN NAUSEA AND/OR VOMITING 7       Days #28      * Dexamethasone 2 MG TAB: 4 MG PO BID 12 Days #48         Instructions: Take 2 tablets twice a day the day prior to chemo the day of        chemo and the day after chemo.      New Diagnostics      * [facility] CBC With Auto Diff, 05/20/21         Dx: Primary cancer of right lower " lobe of lung - C34.31      * [facility] Comp Metabolic Panel, 05/20/21         Dx: Primary cancer of right lower lobe of lung - C34.31      * [facility] Magnesium Serum, 05/20/21         Dx: Primary cancer of right lower lobe of lung - C34.31            Pain      Pain Follow-up Plan            Advanced Care Plan Discussion      Declines Discussion [id number]            Patient Education            Cisplatin (Alternative Therapy)      Coping With Fatigue From Chemotherapy      Coping With Hair Loss From Chemotherapy      Coping With Pain Related to Cancer and Chemotherapy      Coping with Nausea and Vomiting From Chemotherapy      Docetaxel Injection      Patient Education Provided:  Yes            Electronically signed by FLACO CAMACHO  05/20/2021 15:07       Disclaimer: Converted document may not contain table formatting or lab diagrams. Please see [employer] System for the authenticated document.

## 2021-05-28 NOTE — PROGRESS NOTES
Patient: STEPHANE MCKEON     Acct: [id number]     Report: #[id number]  UNIT #: [id number]     : 1965    Encounter Date:2019  PRIMARY CARE: Eugenio Espinoza  ***Signed***  --------------------------------------------------------------------------------------------------------------------  Chief Complaint      Encounter Date      2019            Primary Care Provider      ZACARIAS LEYVA            Referring Provider      KARMEN TORRES            Patient Complaint      Patient is complaining of      New pt here for lung nodule            VITALS      Height 6 ft 0 in / 182.88 cm      Weight 199 lbs 0 oz / 90.481933 kg      BSA 2.13 m2      BMI 27.0 kg/m2      Temperature 97.6 F / 36.44 C - Temporal      Pulse 61      Respirations 12      Blood Pressure 114/71 Sitting, Left Arm      Pulse Oximetry 100%, room air            HPI      The patient is 53 year old male current every day smoker of cigarettes who was     referred for incidental finding of lung nodules on an abdominal pelvic CT scan.     The patient smokes 1.5 packs per day and has done so for 40 years. He complains     of shortness of breath when he walks or exerts himself. He has an inhaler but     ran out of it and cannot afford it. He was using Symbicort and has tried Dulera     and Breo in the past and they seem to help when he uses them. He denies any     hemoptysis, fevers or chills or unintentional weight loss. He is here today with    his wife.             Review of Systems as noted.             Past family, medical, surgical and social histories were all reviewed by myself     with the patient and are unchanged.            Medications were reviewed by myself with the patient and updated in the chart.            ROS      Constitutional:  Denies: Fatigue, Fever, Weight gain, Weight loss, Chills, In    somnia, Other      Respiratory/Breathing:  Complains of: Shortness of air; Denies: Wheezing, Cough,    Hemoptysis, Pleuritic  pain, Other      Endocrine:  Denies: Polydipsia, Polyuria, Heat/cold intolerance, Diabetes, Other      Eyes:  Denies: Blurred vision, Vision Changes, Other      Ears, nose, mouth, throat:  Denies: Mouth lesions, Thrush, Throat pain,     Hoarseness, Allergies/Hay Fever, Post Nasal Drip, Headaches, Recent Head Injury,    Nose Bleeding, Neck Stiffness, Thyroid Mass, Hearing Loss, Ear Fullness, Dry     Mouth, Nasal or Sinus Pain, Dry Lips, Nasal discharge, Nasal congestion, Other      Cardiovascular:  Denies: Palpitations, Syncope, Claudication, Chest Pain, Wake     up Gasping for air, Leg Swelling, Irregular Heart Rate, Cyanosis, Dyspnea on     Exertion, Other      Gastrointestinal:  Denies: Nausea, Constipation, Diarrhea, Abdominal pain,     Vomiting, Difficulty Swallowing, Reflux/Heartburn, Dysphagia, Jaundice, Bloatin    g, Melena, Bloody stools, Other      Genitourinary:  Denies: Urinary frequency, Incontinence, Hematuria, Urgency,     Nocturia, Dysuria, Testicular problems, Other      Musculoskeletal:  Denies: Joint Pain, Joint Stiffness, Joint Swelling, Myalgias,    Other      Hematologic/lymphatic:  DENIES: Lymphadenopathy, Bruising, Bleeding tendencies,     Other      Neurological:  Denies: Headache, Numbness, Weakness, Seizures, Other      Psychiatric:  Denies: Anxiety, Appropriate Effect, Depression, Other      Sleep:  No: Excessive daytime sleep, Morning Headache?, Snoring, Insomnia?, Stop    breathing at sleep?, Other      Integumentary:  Denies: Rash, Dry skin, Skin Warm to Touch, Other      Immunologic/Allergic:  Denies: Latex allergy, Seasonal allergies, Asthma,     Urticaria, Eczema, Other      Immunization status:  No: Up to date            FAMILY/SOCIAL/MEDICAL HX      Surgical History:  Yes: Other Surgeries      Is Mother Still Living?:  Yes       Family History:  Unknown (adopted)      Smoking status:  Current every day smoker (1.5 ppd x 40 years)      Anticoagulation Therapy:  No      Antibiotic  Prophylaxis:  No      Medical History:  Yes: Allergies, Chronic Bronchitis/COPD (INHALER), Depression,    Anxiety, Bipolar Disorder, PTSD, Heart Attack (2013- 2 STENTS PLACED SEES DR. DEANNE PIPER STRESS TEST 5/2017-OK), High Blood Pressure, High Cholesterol, Reflux     Disease, Shortness Of Breath; No: Blood Disease, Chemotherapy/Cancer, Deafness     or Ringing Ears, Seizures, Hemorrhoids/Rectal Prob, Miscellaneous Medical/oth      Psychiatric History      Anxiety, PTSD, bipolar and Depression            PREVENTION      Hx Influenza Vaccination:  Yes      Date Influenza Vaccine Given:  Sep 1, 2018      Influenza Vaccine Declined:  No      2 or More Falls Past Year?:  No      Fall Past Year with Injury?:  No      Hx Pneumococcal Vaccination:  Yes      Encouraged to follow-up with:  PCP regarding preventative exams.      Chart initiated by      Dian Lopez MA            ALLERGIES/MEDICATIONS      Allergies:        Coded Allergies:             NO KNOWN ALLERGIES (Unverified , 1/28/19)      Medications    Last Reconciled on 1/29/19 16:30 by ETHAN REILLY,       MDI-Albuterol (Ventolin HFA) 8 Gm Hfa.aer.ad      2 PUFFS INH Q4-6H PRN for SHORTNESS OF BREATH, #1 MDI 0 Refills         Reported         6/29/17       (Nicotine Gum)   No Conflict Check      4 MG PO ASDIR         Reported         6/29/17       Nitroglycerin (Nitrostat*) 0.4 Mg Tablet      0.4 MG SL ASDIR, #25 TAB 1 Refill         Reported         6/29/17       QUEtiapine XR (SEROquel XR) 300 Mg Tab.er.24h      300 MG PO HS for 30 Days, #30 TAB.SR         Reported         6/29/17       Esomeprazole Mag (NexIUM*) 40 Mg Suspdr.pkt      40 MG PO QDAY, #30 PACKET 0 Refills         Reported         6/29/17       Ranitidine Hcl (Ranitidine*) 150 Mg Tablet      150 MG PO HS, #30 TAB 0 Refills         Reported         6/29/17       Aspirin (Aspirin) 325 Mg Tablet      325 MG PO QDAY, #100 TAB 0 Refills         Reported         6/29/17       Metoprolol Tartrate  (Metoprolol Tartrate*) 25 Mg Tablet      12.5 MG PO BID, #90 TAB 0 Refills         Reported         6/29/17       Amitriptyline HCl (Amitriptyline HCl) 50 Mg Tablet      50 MG PO HS, #30 TAB         Reported         6/29/17       Atorvastatin (Atorvastatin) 80 Mg Tablet      80 MG PO HS, #30 TAB 0 Refills         Reported         6/29/17       Hydrocodone/Acetaminophen 5/325 MG (Hydrocodone/Acetaminophen 5/325 MG) 1 Each     Tablet      1 TAB PO Q4-6H PRN for BREAKTHROUGH PAIN, TAB 0 Refills         Reported         6/29/17      Current Medications      Current Medications Reviewed 1/28/19            EXAM      Vital signs reviewed.      HEENT: Pupils are equally round and reactive to light and accommodation.      Extraocular muscles intact bilateral. Nares patent without hypertrophy of the     turbinates.  TM's are clear bilaterally. Small oropharynx without lesions or     erythema.       NECK:  Supple without tracheal deviation or lymphadenopathy. No thyromegaly     appreciated.       LYMPHATICS: No cervical or supraclavicular lymphadenopathy.       RESPIRATORY:  Clear to auscultation bilaterally, no wheezes, rales or rhonchi,     tympanic to percussion.      CVS:  Regular rate and rhythm, no murmurs, rubs or gallops, no lower extremity     edema, , equal radial pulses.      GI: Abdomen soft, nontender, nondistended, no hepatomegaly appreciated.  Bowel     sounds present in all 4 quadrants.       MUSCULOSKELETAL: No erythema, warmth or fluctuance over the major joints     including the knees, ankles, wrists and elbows.        SKIN: No rashes or lesions.       NEUROLOGICAL: Alert and oriented X 3.  No focal deficits on exam. Cranial nerves    II-XII intact bilaterally.       PSYCH: Patient has appropriate mood and affect.      Vtials      Vitals:             Height 6 ft 0 in / 182.88 cm           Weight 199 lbs 0 oz / 90.479550 kg           BSA 2.13 m2           BMI 27.0 kg/m2           Temperature 97.6 F / 36.44 C  - Temporal           Pulse 61           Respirations 12           Blood Pressure 114/71 Sitting, Left Arm           Pulse Oximetry 100%, room air            REVIEW      Results Reviewed      PCCS Results Reviewed?:  Yes Prev Radiology Results      Radiographic Results               [facility]                PACS RADIOLOGY REPORT            Patient: STEPHANE MCKEON   Acct: #[id number]   Report: #[id number]            UNIT #: [id number]    DOS: 18 0715      INSURANCE:[employer] MEDICAID   ORDER #:[id number]      LOCATION:NAEL     : 1965            PROVIDERS      ADMITTING:     ATTENDING: Gutierrez Cruz      FAMILY:  NONE,MD   ORDERING:  Gutierrez Cruz         OTHER:    DICTATING:  KRYSTAL MARRERO MD            REQ #:[id number]   EXAM:ABDPELWO - CT ABDOMEN PELVIS wo CONTRAST      REASON FOR EXAM:        REASON FOR VISIT:  RT ORCHIALGIA            *******Signed******         PROCEDURE:   CT ABDOMEN PELVIS WITHOUT CONTRAST             COMPARISON:   [facility], CT, CHEST W/ CONTRAST,     2016, 12:29.             INDICATIONS:   LOW ABD PAIN, RT GROIN PAIN AND SWELLING             TECHNIQUE:   CT images were created without intravenous contrast.               PROTOCOL:     Standard imaging protocol performed                RADIATION:     DLP: 580.1mGy*cm          Automated exposure control was utilized to minimize radiation dose.              FINDINGS:         Abdomen:  5 mm nodule right middle lobe.             Liver, spleen, adrenal glands, pancreas, gallbladder unremarkable.  Bowel loops     are nondilated.        Moderate colonic stool.  Normal appendix.             Tiny nonobstructing calculus lower pole left kidney.  No radiodense ureteral     calculus or       hydronephrosis.             Pelvis:  No radiodense bladder calculus.  No aggressive appearing bone lesion.             CONCLUSION:   No acute findings.             Tiny  nonobstructing calculus in the lower pole the left kidney.             5 mm nodule in the right middle lobe is more apparent than on 2016 CT.      Recommend attention on a 1       year followup chest CT.                KRYSTAL MARRERO MD             Electronically Signed and Approved By: KRYSTAL MARRERO MD on 12/14/2018 at 8:04                        Until signed, this is an unconfirmed preliminary report that may contain      errors and is subject to change.                                              DOWER:      D:12/14/18 0804            Assessment      Lung nodule, solitary - R91.1            Smoker - F17.200            COPD (chronic obstructive pulmonary disease) - J44.9            Notes      New Diagnostics      * 6 Min Walk With Pulse Ox, Routine         Dx: Smoker - F17.200      * PFT-Comp, PrePost,DLCO,BodyBox, Week         Dx: Smoker - F17.200      * Low Dose Chest CT, Year         Dx: Lung nodule, solitary - R91.1      ASSESSMENT:      1. Current every day smoker with tobacco dependence.       2. Chronic obstructive pulmonary disease and emphysema.       3. 5 mm lung nodules incidental finding on abdominal pelvis CT scan.             PLAN:      1. I have given the patient samples of trelegy and showed him how to     appropriately use it.      2. I will obtain pulmonary function tests and six minute walk test as well as     repeat CT scan of the chest in 1 years time.       3. I counseled the patient for approximately 3 minutes regarding smoking c    essation and he states he has tried all methods including Chantix, Wellbutrin     and nicotine patches, gum and lozenges and nothing works for him.  I encouraged     him to call the 1-800 QUIT NOW number.       4. Alpha-1 antitrypsin testing was done in our office today.       5. I will see the patient back in 3 months at [facility] to go over pulmonary     function tests and see how he is doing on trelegy and give more samples if     needed.            Patient  Education      Tobacco Cessation Counseling:  for 3 - 10 minutes      Patient Education Provided:  COPD, How to use an Inhaler, Lung Cancer, Smoking     Cessation      Time Spent:  > 50% /Coord Care            Patient Education:        Chronic Obstructive Pulmonary Disease                 Disclaimer: Converted document may not contain table formatting or lab diagrams. Please see [employer] for the authenticated document.

## 2021-05-28 NOTE — PROGRESS NOTES
Patient: STEPHANE MCKEON     Acct: [id number]     Report: #[id number]  UNIT #: [id number]     : 1965    Encounter Date:2019  PRIMARY CARE: Eugenio Espinoza [facility]  ***Signed***  --------------------------------------------------------------------------------------------------------------------  Chief Complaint      Encounter Date      2019            Primary Care Provider      EUGENIO SALDANA            Referring Provider      KARMEN TORRES            Patient Complaint      Patient is complaining of      Patient here today for 2 month follow up, COPD            VITALS      Height 72 in / 182.88 cm      Weight 211 lbs  / 95.144888 kg      BSA 2.18 m2      BMI 28.6 kg/m2      Temperature 98.2 F / 36.78 C - Oral      Pulse 68      Respirations 14      Blood Pressure 116/76 Sitting, Left Arm      Pulse Oximetry 97%, room air            HPI      The patient is a 54 year old  male, patient of Dr. Moraes's here for     follow up on pulmonary nodule and COPD.  Patient reports dyspnea on exertion and    dry cough at times.  Symptoms improved with Symbicort. Patient states that he is    taking Symbicort every day as prescribed. Patient states that he is also taking     Prilosec which is helping with his reflux. Patient states that he is still     smoking 1.5 packs per day.  He has tried Chantix, but was not able to take     Chantix due to medication giving him nightmares. He tried Wellbutrin and it did     not help him to quit smoking and therefore stopped taking that medication too.      He has tried the nicotine gum and did not like the taste of it.  Patient denies     any increased coughing or wheezing, fever, chills, night sweats, weight loss,     hemoptysis, nausea or vomiting.            I have personally reviewed the review of systems, past family, social, surgical     and medical histories and I agree with the findings.      Copies To:   EUGENIO SALDANA ;            ROS       Constitutional:  Denies: Fatigue, Fever, Weight gain, Weight loss, Chills,     Insomnia, Other      Respiratory/Breathing:  Complains of: Shortness of air; Denies: Wheezing, Cough,    Hemoptysis, Pleuritic pain, Other      Endocrine:  Denies: Polydipsia, Polyuria, Heat/cold intolerance, Diabetes, Other      Eyes:  Denies: Blurred vision, Vision Changes, Other      Ears, nose, mouth, throat:  Denies: Congestion, Dysphagia, Hearing Changes, Nose    Bleeding, Nasal Discharge, Throat pain, Tinnitus, Other      Cardiovascular:  Denies: Chest Pain, Exertional dyspnea, Peripheral Edema,     Palpitations, Syncope, Wake up Gasping for air, Orthopnea, Tachycardia, Other      Gastrointestinal:  Denies: Abdominal pain/cramping, Bloody stools, Constipation,    Diarrhea, Melena, Nausea, Vomiting, Other      Genitourinary:  Denies: Dysuria, Urinary frequency, Incontinence, Hematuria,     Urgency, Other      Musculoskeletal:  Denies: Joint Pain, Joint Stiffness, Joint Swelling, Myalgias,    Other      Hematologic/lymphatic:  DENIES: Lymphadenopathy, Bruising, Bleeding tendencies,     Other      Neurologic:  Denies: Headache, Numbness, Weakness, Seizures, Other      Psychiatric:  Denies: Anxiety, Appropriate Effect, Depression, Other      Sleep:  No: Excessive daytime sleep, Morning Headache?, Snoring, Insomnia?, Stop    breathing at sleep?, Other      Integumentary:  Denies: Rash, Dry skin, Skin Warm to Touch, Other            FAMILY/SOCIAL/MEDICAL HX      Surgical History:  Yes: Other Surgeries      Is Mother Still Living?:  Yes       Family History:  Unknown (adopted)      Smoking status:  Current every day smoker (1.5 ppd x 40 years)      Anticoagulation Therapy:  No      Antibiotic Prophylaxis:  No      Medical History:  Yes: Allergies, Chronic Bronchitis/COPD (INHALER), Depression,    Anxiety, Bipolar Disorder, PTSD, Heart Attack (2013- 2 STENTS PLACED SEES DR. AOTES STRESS TEST 5/2017-OK), High Blood Pressure, High  Cholesterol, Reflux     Disease, Shortness Of Breath; No: Blood Disease, Chemotherapy/Cancer, Deafness     or Ringing Ears, Seizures, Hemorrhoids/Rectal Prob, Sinus Trouble, Miscellaneous    Medical/oth      Psychiatric History      depression and anxiety            PREVENTION      Hx Influenza Vaccination:  Yes      Date Influenza Vaccine Given:  Sep 1, 2019      Influenza Vaccine Declined:  No      2 or More Falls Past Year?:  No      Fall Past Year with Injury?:  No      Hx Pneumococcal Vaccination:  Yes      Encouraged to follow-up with:  PCP regarding preventative exams.      Chart initiated by      Melody Jo CMA            ALLERGIES/MEDICATIONS      Allergies:        Coded Allergies:             NO KNOWN ALLERGIES (Unverified , 11/7/19)      Medications    Last Reconciled on 11/7/19 11:42 by BALTAZAR MUNGUIA,       Nicotine 21 Mg Patch (Nicotine 21 Mg Patch) 1 Each Patch.td24      21 MG TRANSDERM QDAY, #42 PATCH         Prov: BALTAZAR MUNGUIA [facility]         11/7/19       MDI-Albuterol (Proair HFA) 8.5 Gm Hfa.aer.ad      2 PUFFS INH Q4-6H PRN for SHORTNESS OF BREATH, #1 MDI 5 Refills         Prov: BALTAZAR MUNGUIA [facility]         11/7/19       Rosuvastatin Calcium (Crestor*) 40 Mg Tablet      40 MG PO HS, #30 TAB 0 Refills         Reported         11/7/19       Omeprazole (PriLOSEC) 10 Mg Capsule.dr      20 MG PO QDAY, CAP         Reported         11/7/19       NEB-Albuterol Sulf (Albuterol) 2.5 Mg/3 Ml Vial.neb      2.5 MG INH Q4H PRN for SHORTNESS OF BREATH, #120 NEB 0 Refills         Prov: BALTAZAR MUNGUIA [facility]         11/7/19       Budesonide/Formoterol Fumarate (Symbicort 160/4.5 Mcg) 10.2 Gm Inh      2 PUFF INH RTBID, #1 INH 0 Refills         Prov: BALTAZAR MUNGUIA [facility]         11/7/19       Nicotine Polacrilex (Nicotine) 2 Mg Lozenge      2 MG BUCCAL Q4-6H, #180 EDWIN         Prov: BALTAZAR MUNGUIA [facility]         11/7/19       MDI-Albuterol (Ventolin HFA) 8 Gm Hfa.aer.ad      2 PUFFS INH Q4-6H PRN for  SHORTNESS OF BREATH, #1 MDI 3 Refills         Prov: ETHAN REILLY         9/20/19       Lisinopril* (Lisinopril*) 10 Mg Tablet      10 MG PO QDAY, #30 TAB 0 Refills         Reported         7/1/19       Prazosin HCl (Prazosin HCl) 2 Mg Capsule      2 MG PO QDAY, #60 CAP 0 Refills         Reported         7/1/19       Nitroglycerin (Nitrostat*) 0.4 Mg Tablet      0.4 MG SL ASDIR, #25 TAB 1 Refill         Reported         6/29/17       QUEtiapine XR (SEROquel XR) 300 Mg Tab.er.24h      300 MG PO HS for 30 Days, #30 TAB.SR         Reported         6/29/17       Ranitidine HCl (Ranitidine HCl) 150 Mg Tablet      150 MG PO HS, #30 TAB 0 Refills         Reported         6/29/17       Aspirin (Aspirin) 325 Mg Tablet      325 MG PO QDAY, #100 TAB 0 Refills         Reported         6/29/17       Metoprolol Tartrate (Metoprolol Tartrate) 25 Mg Tablet      12.5 MG PO BID, #90 TAB 0 Refills         Reported         6/29/17       Hydrocodone/Acetaminophen 5/325 MG (Hydrocodone/Acetaminophen 5/325 MG) 1 Each     Tablet      1 TAB PO Q4-6H PRN for BREAKTHROUGH PAIN, TAB 0 Refills         Reported         6/29/17      Current Medications      Current Medications Reviewed 11/7/19            EXAM      VITAL SIGNS:  Reviewed.        GENERAL APPEARANCE: well developed; well nourished; no acute distress.       NECK:  Supple without tracheal deviation or lymphadenopathy.  No thyromegaly     appreciated.      LYMPHATICS:  No cervical or supraclavicular lymphadenopathy.      HEENT: Pupils are equal, round and reactive to light. There is no scleral     icterus.  Nares patent without hypertrophy of the turbinates. No erythema of the    passages.  TMs are clear bilaterally with good cone of light. The posterior     pharynx is without  lesions or erythema.      RESPIRATORY:  Clear to auscultation bilaterally.  No wheezes,  rhonchi or     crackles appreciated. Normal work of breathing.        CARDIOVASCULAR:  Regular rate and rhythm.  No  murmurs, gallops or rubs.  No     lower extremity edema.  Equal radial pulses.        GI: Soft, nontender, nondistended, no organomegaly.  Bowel sounds present in all    four quadrants.      MUSCULOSKELETAL:  No joint effusions, erythema or warmth over the major joint     systems.      SKIN:  No rashes or lesions.      NEUROLOGIC: Cranial nerves II-XII are intact bilaterally.  Moves all     extremities. Ambulates with ease.      PSYCH:  Appropriate mood and affect.      Vitals      Vitals:             Height 72 in / 182.88 cm           Weight 211 lbs  / 95.328223 kg           BSA 2.18 m2           BMI 28.6 kg/m2           Temperature 98.2 F / 36.78 C - Oral           Pulse 68           Respirations 14           Blood Pressure 116/76 Sitting, Left Arm           Pulse Oximetry 97%, room air            REVIEW      Results Reviewed      PCCS Results Reviewed?:  Yes Prev Lab Results, Yes Prev Radiology Results, Yes     Previous [facility] Records            Assessment      Lung nodule - R91.1            Notes      New Medications      * Nicotine Polacrilex (Nicotine) 2 MG LOZENGE: 2 MG BUCCAL Q4-6H #180      * Budesonide/Formoterol Fumarate (Symbicort 160/4.5 Mcg) 10.2 GM INH: 2 PUFF INH      RTBID #1      * NEB-Albuterol Sulf (Albuterol) 2.5 MG/3 ML VIAL.NEB: 2.5 MG INH Q4H PRN       SHORTNESS OF BREATH #120         Instructions: DIAGNOSIS CODE REQUIRED PRIOR TO PRESCRIBING.      * OMEPRAZOLE (PriLOSEC) 10 MG CAPSULE.DR: 20 MG PO QDAY      * Rosuvastatin Calcium (Crestor*) 40 MG TABLET: 40 MG PO HS #30      * MDI-Albuterol (Proair HFA) 8.5 GM HFA.AER.AD: 2 PUFFS INH Q4-6H PRN SHORTNESS       OF BREATH #1      * Nicotine 21 Mg Patch 1 EACH PATCH.TD24: 21 MG TRANSDERM QDAY #42      Discontinued Medications      * Nicotine Polacrilex (Nicorette) 2 MG GUM: 2 MG PO Q4-6H #180      * buPROPion SR (Wellbutrin SR) 150 MG TAB.SR.12H: 150 MG PO BID 30 Days #60         Instructions: Take 150mg po qd x 3 days, then increase to 150mg po  bid.      * Fluticasone/Vilanterol 200-25 Mcg Inh (Breo Ellipta 200-25 Mcg Inh) 1 EACH       BLST.W.DEV: 1 PUFF INH QDAY 30 Days #1         Instructions: to replace symbicort 160      New Diagnostics      * Chest W/O Cont CT, 6 WEEKS         Dx: Lung nodule - R91.1      ASSESSMENT:       1. COPD without acute exacerbation.      2.  5 mm lung nodule incidentally noted on an abdominal pelvic CT scan.      3.  Ongoing tobacco abuse with cigarettes.  Smoking 1.5 packs per day currently.            PLAN:        1. Patient states Symbicort is helping with symptoms.  He is needing a refill on    his ProAir inhaler and Albuterol nebs, Both were refilled in the office today.       2. We will order repeat chest CT to be completed in six weeks to reevaluate 5 mm    lung nodule.      3.  I counseled the patient for five minutes today on smoking cessation.  I have    counseled the patient on the risks of continued smoking including the risks of     lung cancer, head and neck cancer, renal cancer, heart disease, stroke, and     early death.  The patient states that he has tried Chantix, Wellbutrin, and     nicotine gum and none of those interventions helped him to quit smoking.   I     will start patient on nicotine patch today and nicotine lozenges. Discussed with    patient how to take medications and possible side effects of medications     discussed with patient. Patient verbalized understanding and compliance.      4. Follow up in 2-3 months with Dr. Garcias, sooner if needed.        5. Pt reports that he is up to date on flu and pneumonia vaccines.            Patient Education      Tobacco Cessation Counseling:  for 3 - 10 minutes      Patient Education Provided:  COPD, Smoking Cessation      Time Spent:  > 50% /Coord Care            Electronically signed by BAL[facility]AR [facility]UNGUIA [facility]  11/08/2019 10:10       Disclaimer: Converted document may not contain table formatting or lab diagrams. Please see [employer] for the authenticated document.

## 2021-05-28 NOTE — PROGRESS NOTES
Patient: HOLLIS MCKEON     Acct: [id number]     Report: #[id number]  UNIT #: [id number]     : 1965    Encounter Date:2020  PRIMARY CARE: Eugenio Espinoza  ***Signed***  --------------------------------------------------------------------------------------------------------------------  History of Present Illness            Chief Complaint: 1W F/U COPD, Emphysema, Hypertension, Pulm. Nodule.             Hollis Mckeon is presenting for evaluation via Telehealth visit. Verbal     consent obtained before beginning visit.            PAST MEDICAL HISTORY/OVERVIEW OF PATIENT SYMPTOMS            Symptoms: Nothing at this time            Any known Exposure to COVID-19: No            Current every day smoker (1.5 ppd x 40 years)            Provider spent 11 minutes with the patient during telehealth visit.            The following staff were present during this visit: Dian Lopez MA, Jannet AVERY             Flu and Pneumonia UTD            The patient is a 55 year old male patient of Dr. Moraes who is a everyday     smoker, smoking one pack of cigarettes a day and was started on nicotine patches    and gum.  The patient states he is using them and is now smoking less.  The     patient also has a history of COPD with emphysema who presents for TeleHealth     follow up visit today. The patient had a TeleHealth visit with myself back on     2020 due to recently being seen in the ER on 2020 due to having     chest pain and chest discomfort and having a cough, shortness of breath and     wheezing.  The patient at that time reported he was diagnosed with pneumonia and    pleurisy and was prescribed Levaquin.  The patient states that he has finished     his antibiotics and is doing much better. The patient states his cough is     significantly improved and his shortness of air has improved.  The patient     currently denies any fever, chills, night sweats, hemoptysis,  purulent sputum     production, swollen glands in the head and neck, chest pain, chest tightness,     abdominal pain, nausea, vomiting, diarrhea.  The patient denies any headaches,     myalgias, sore throat, changes in sense of taste and/or smell or any other     coronavirus or flu-like symptoms.  The patient states he is using Spiriva and     Symbicort everyday as prescribed and uses albuterol inhaler as needed.  The     patient states that he would like to have a nebulizer machine to do breathing     treatments as needed.  The patient states he is able to perform his ADLs without    difficulty.            I have personally reviewed the review of systems, past family, social, surgical     and medical histories and I agree with those as entered in the chart.              Physical exam deferred due to TeleHealth visit.              I reviewed my last TeleHealth visit.                           Allergies and Medications      Allergies:        Coded Allergies:             NO KNOWN DRUG ALLERGIES (Verified  Allergy, Unknown, 12/24/20)      Medications    Last Reconciled on 12/24/20 09:45 by BALTAZAR MUNGUIA       Albuterol/Ipratropium (Duoneb) 3 Ml Ampul.neb      3 ML INH Q4H PRN for SHORTNESS OF BREATH, #120 NEB 5 Refills         Prov: BALTAZAR MUNGUIA [facility]         12/24/20       levoFLOXacin (levoFLOXacin) 500 Mg Tablet      500 MG PO QDAY for 3 Days, #3 TAB         Prov: BALTAZAR MUNGUIA [facility]         12/16/20       Nicotine Polacrilex (Nicorette) 2 Mg Gum      2 MG PO Q4-6H, #180 BOX         Prov: BALTAZAR MUNGUIA [facility]         12/16/20       Nicotine 21 Mg Patch (Nicoderm 21 Mg Patch) 1 Each Patch.td24      21 MG TRANSDERM QDAY for 30 Days, #30 PATCH         Prov: BALTAZAR MUNGUIA [facility]         12/16/20       Albuterol/Ipratropium (Duoneb) 3 Ml Ampul.neb      3 ML INH Q4H PRN for SHORTNESS OF BREATH, #120 NEB 5 Refills         Prov: BALTAZAR MUNGUIA [facility]         12/16/20       hydrOXYzine HCL (hydrOXYzine HCL) 50  Mg Tablet      50 MG PO TID, #90 TAB 0 Refills         Reported         12/16/20       Budesonide/Formoterol Fumarate (Symbicort 160/4.5 Mcg) Unknown Strength Inh      INH RTBID, #1 INH 0 Refills         Reported         12/8/20       Aspirin Chew (Aspirin Baby) 81 Mg Tab.chew      81 MG PO QDAY, #30 TAB.CHEW 0 Refills         Reported         12/8/20       (nebulizer tubing)   No Conflict Check      #1         Prov: BALTAZAR MUNGUIA [facility]         2/3/20       Tiotropium Bromide (Spiriva Respimat 2.5 mcg/Puff) 4 Gm Mist.inhal               Prov: BALTAZAR MUNGUIA [facility]         1/24/20       MDI-Albuterol (Proair HFA) 8.5 Gm Hfa.aer.ad      2 PUFFS INH Q4-6H PRN for SHORTNESS OF BREATH, #1 MDI 5 Refills         Prov: BALTAZAR MUNGUIA [facility]         11/7/19       Rosuvastatin Calcium (Crestor*) 40 Mg Tablet      40 MG PO HS, #30 TAB 0 Refills         Reported         11/7/19       Omeprazole (priLOSEC) 10 Mg Capsule.dr      20 MG PO QDAY, CAP         Reported         11/7/19       NEB-Albuterol Sulf (Albuterol) 2.5 Mg/3 Ml Vial.neb      2.5 MG INH Q4H PRN for SHORTNESS OF BREATH, #120 NEB 0 Refills         Prov: BALTAZAR MUNGUIA [facility]         11/7/19       Prazosin HCl (Prazosin HCl) 2 Mg Capsule      2 MG PO QDAY, #60 CAP 0 Refills         Reported         7/1/19       Nitroglycerin (Nitrostat*) 0.4 Mg Tablet      0.4 MG SL ASDIR, #25 TAB 1 Refill         Reported         6/29/17       QUEtiapine XR (SEROquel XR) 300 Mg Tab.er.24h      300 MG PO HS for 30 Days, #30 TAB.SR         Reported         6/29/17       Metoprolol Tartrate (Metoprolol Tartrate) 25 Mg Tablet      12.5 MG PO BID, #90 TAB 0 Refills         Reported         6/29/17       HYDROcodone-Acetaminophen 5-325 Mg (HYDROcodone-Acetaminophen 5-325 Mg) 1 Each     Tablet      1 TAB PO Q4-6H PRN for BREAKTHROUGH PAIN, TAB 0 Refills         Reported         6/29/17            Plan      Orders:  Phone Eval 11-20 mi [id number]      Instructions      * Chronic conditions  reviewed and taken in consideration for today's treatment       plan.      * Plan Of Care: ()      * Patient instructed to seek medical attention urgently for new or worsening       symptoms.      * Patient was educated/instructed on their diagnosis, treatment and medications       today.      * Recommend self monitoring. Instructions given.      * Recommend self quarantine for 14 days.      * Recommend self quarantine until without fever for 72 hours without using fever       reducing medications.      * Recommends over the counter medications for symptom management.            ASSESSMENT:       1.  COPD with emphysema with acute exacerbation, improving.      2.  Bacterial pneumonia from unspecified organism.      3.  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 Villa.      4.  History of lung nodule resolved on imaging.      5. Tobacco abuse with cigarettes that is ongoing.            PLAN:      1. The patient reports that he is feeling much better after finishing     antibiotics.      2.  The patient to continue Symbicort and Spiriva everyday as prescribed and     rinse his mouth out after each use.      3. The patient reports he has not received his nebulizer machine.  I will check     on the status of nebulizer machine and resend DuoNebs for patient to use up to     four times a day as needed.      4. The patient is already scheduled to have a repeat CT scan again in six weeks     to ensure resolution of his pneumonia, patient is advised to have test completed     as scheduled.      5. I spent three minutes today counseling the patient on smoking cessation.  I     counseled the patient on the risks of continued smoking including the risk of     lung cancer, head and neck cancer, renal cancer, heart disease, stroke, and     early death. The patient already has nicotine gum and patches and states that he     has decreased his smoking.  The patient is advised to continue  to decrease the     number of cigarettes he is smoking up to the point to where he can quit.      6. Patient is advised to call the office, 911 or go to the ER with any new or     worsening symptoms.      7. The patient reports he is up-to-date with his flu and pneumonia vaccines.      8.  The patient is to follow up with Dr. Simpson in six weeks after CT scan is     completed, sooner if needed.        6.            Electronically signed by BALTAZAR MUNGUIA [facility]  12/30/2020 11:26       Disclaimer: Converted document may not contain table formatting or lab diagrams. Please see [employer] for the authenticated document.

## 2021-05-28 NOTE — PROGRESS NOTES
Patient: STEPHANE MCKEON     Acct: [id number]     Report: #[id number]  UNIT #: [id number]     : 1965    Encounter Date:2019  PRIMARY CARE: Eugenio Espinoza  ***Signed***  --------------------------------------------------------------------------------------------------------------------  Chief Complaint      Encounter Date      2019            Primary Care Provider      ZACARIAS LEYVA            Referring Provider      KARMEN TORRES            Patient Complaint      Patient is complaining of      Patient here today for follow up, discuss 6 min walk and PFT results            VITALS      Height 72 in / 182.88 cm      Weight 200 lbs 7 oz / 90.085073 kg      BSA 2.13 m2      BMI 27.2 kg/m2      Temperature 98.2 F / 36.78 C - Oral      Pulse 60      Respirations 14      Blood Pressure 116/64 Sitting, Left Arm      Pulse Oximetry 98%, room air            HPI      The patient is a pleasant 54 year old white male patient of Dr. Moraes's last     seen by her on 2019 as a new patient referred for a pulmonary nodule     incidentally noted on an abdominopelvic CT scan.  He has a longstanding smoking     history, has smoked between 1.5-2 packs per day for 40 years, continues to smoke    that same amount.  He has had dyspnea on exertion.  She started him on Trelegy     ellipta inhaler and he felt like it did not help him, so his PCP put him back on    Symbicort 160.  He tells me he has also tried Breo and Dulera and neither of     those helped him and only the Symbicort has helped him with his breathing.  He     thinks he needs a prior authorization done to be able to keep using the     Symbicort.  He has had PFTs, six minute walk test and alpha 1 antitrypsin     testing done since his last visit.  PFTs showed normal spirometry without     obstruction, but lung volumes did show airtrapping and he has mildly decreased     diffusion capacity.  His alpha 1 antitrypsin testing showed a normal  genotype of    MM.  His six minute walk test was normal without any desaturations. He tells me     his breathing is about the same if he does not use the Symbicort, but is     improved and he has minimal dyspnea on exertion as long as he uses that as     prescribed.  He denies any increased coughing or wheezing. He denies hemoptysis,    fever or chills.              I have reviewed her ROS, medical, surgical and family history and agree with     those as entered in the chart.      Copies To:   ETHAN REILLY ;            ROS      Constitutional:  Denies: Fatigue, Fever, Weight gain, Weight loss, Chills,     Insomnia, Other      Respiratory/Breathing:  Complains of: Shortness of air; Denies: Wheezing, Cough,    Hemoptysis, Pleuritic pain, Other      Endocrine:  Denies: Polydipsia, Polyuria, Heat/cold intolerance, Diabetes, Other      Eyes:  Denies: Blurred vision, Vision Changes, Other      Ears, nose, mouth, throat:  Denies: Congestion, Dysphagia, Hearing Changes, Nose    Bleeding, Nasal Discharge, Throat pain, Tinnitus, Other      Cardiovascular:  Denies: Chest Pain, Exertional dyspnea, Peripheral Edema,     Palpitations, Syncope, Wake up Gasping for air, Orthopnea, Tachycardia, Other      Gastrointestinal:  Denies: Abdominal pain/cramping, Bloody stools, Constipation,    Diarrhea, Melena, Nausea, Vomiting, Other      Genitourinary:  Denies: Dysuria, Urinary frequency, Incontinence, Hematuria,     Urgency, Other      Musculoskeletal:  Denies: Joint Pain, Joint Stiffness, Joint Swelling, Myalgias,    Other      Hematologic/lymphatic:  DENIES: Lymphadenopathy, Bruising, Bleeding tendencies,     Other      Neurologic:  Denies: Headache, Numbness, Weakness, Seizures, Other      Psychiatric:  Denies: Anxiety, Appropriate Effect, Depression, Other      Sleep:  No: Excessive daytime sleep, Morning Headache?, Snoring, Insomnia?, Stop    breathing at sleep?, Other      Integumentary:  Denies: Rash, Dry skin, Skin Warm to  Touch, Other            FAMILY/SOCIAL/MEDICAL HX      Surgical History:  Yes: Other Surgeries      Is Mother Still Living?:  Yes       Family History:  Unknown (adopted)      Smoking status:  Current every day smoker (1.5 ppd x 40 years)      Anticoagulation Therapy:  No      Antibiotic Prophylaxis:  No      Medical History:  Yes: Allergies, Chronic Bronchitis/COPD (INHALER), Depression,    Anxiety, Bipolar Disorder, PTSD, Heart Attack (2013- 2 STENTS PLACED SEES DR. OATES STRESS TEST 5/2017-OK), High Blood Pressure, High Cholesterol, Reflux     Disease, Shortness Of Breath; No: Blood Disease, Chemotherapy/Cancer, Deafness     or Ringing Ears, Seizures, Hemorrhoids/Rectal Prob, Sinus Trouble, Miscellaneous    Medical/oth      Psychiatric History      anxiety and depression            PREVENTION      Hx Influenza Vaccination:  Yes      Date Influenza Vaccine Given:  Sep 1, 2018      Influenza Vaccine Declined:  No      2 or More Falls Past Year?:  No      Fall Past Year with Injury?:  No      Hx Pneumococcal Vaccination:  Yes      Encouraged to follow-up with:  PCP regarding preventative exams.      Chart initiated by      Melody Jo CMA            ALLERGIES/MEDICATIONS      Allergies:        Coded Allergies:             NO KNOWN ALLERGIES (Unverified , 7/1/19)      Medications    Last Reconciled on 7/1/19 13:32 by RD CASTILLO      Lisinopril* (Lisinopril*) 10 Mg Tablet      10 MG PO QDAY, #30 TAB 0 Refills         Reported         7/1/19       Prazosin HCl (Prazosin HCl) 2 Mg Capsule      2 MG PO QDAY, #60 CAP 0 Refills         Reported         7/1/19       MDI-Albuterol (Ventolin HFA) 8 Gm Hfa.aer.ad      2 PUFFS INH Q4-6H PRN for SHORTNESS OF BREATH, #1 MDI 0 Refills         Reported         6/29/17       Nitroglycerin (Nitrostat*) 0.4 Mg Tablet      0.4 MG SL ASDIR, #25 TAB 1 Refill         Reported         6/29/17       QUEtiapine XR (SEROquel XR) 300 Mg Tab.er.24h      300 MG PO HS for 30 Days,  #30 TAB.SR         Reported         6/29/17       Esomeprazole Mag (NexIUM*) 40 Mg Suspdr.pkt      40 MG PO QDAY, #30 PACKET 0 Refills         Reported         6/29/17       Ranitidine Hcl (Ranitidine*) 150 Mg Tablet      150 MG PO HS, #30 TAB 0 Refills         Reported         6/29/17       Aspirin (Aspirin) 325 Mg Tablet      325 MG PO QDAY, #100 TAB 0 Refills         Reported         6/29/17       Metoprolol Tartrate (Metoprolol Tartrate) 25 Mg Tablet      12.5 MG PO BID, #90 TAB 0 Refills         Reported         6/29/17       Amitriptyline HCl (Amitriptyline HCl) 50 Mg Tablet      50 MG PO HS, #30 TAB         Reported         6/29/17       Atorvastatin (Atorvastatin) 80 Mg Tablet      80 MG PO HS, #30 TAB 0 Refills         Reported         6/29/17       Hydrocodone/Acetaminophen 5/325 MG (Hydrocodone/Acetaminophen 5/325 MG) 1 Each     Tablet      1 TAB PO Q4-6H PRN for BREAKTHROUGH PAIN, TAB 0 Refills         Reported         6/29/17      Current Medications      Current Medications Reviewed 7/1/19            EXAM      VITAL SIGNS:  Reviewed.        NECK:  Supple without tracheal deviation or lymphadenopathy.  No thyromegaly     appreciated.      LYMPHATICS:  No cervical or supraclavicular lymphadenopathy.      HEENT: Pupils are equal, round and reactive to light. There is no scleral     icterus.  Nares patent without hypertrophy of the turbinates. No erythema of the    passages.  TMs are clear bilaterally with good cone of light. The posterior     pharynx is without  lesions or erythema.      RESPIRATORY:  Lungs are grossly clear to auscultation.  No wheezes, rhonchi or     crackles appreciated.  Normal work of breathing.        CARDIOVASCULAR:  Regular rate and rhythm.  No murmurs, gallops or rubs.  No     lower extremity edema.  Equal radial pulses.        GI: Soft, nontender, nondistended, no organomegaly.  Bowel sounds present in all    four quadrants.      MUSCULOSKELETAL:  No joint effusions, erythema or  warmth over the major joint     systems.      SKIN:  No rashes or lesions.      NEUROLOGIC: Cranial nerves II-XII are intact bilaterally.  Moves all     extremities. Ambulates with ease.      PSYCH:  Appropriate mood and affect.      Vitals      Vitals:             Height 72 in / 182.88 cm           Weight 200 lbs 7 oz / 90.995984 kg           BSA 2.13 m2           BMI 27.2 kg/m2           Temperature 98.2 F / 36.78 C - Oral           Pulse 60           Respirations 14           Blood Pressure 116/64 Sitting, Left Arm           Pulse Oximetry 98%, room air            REVIEW      Results Reviewed      [facility] Results Reviewed?:  Yes Prev Lab Results, Yes Prev Radiology Results, Yes     Previous [facility] Records      Lab Results      I have personally reviewed his previous lab work, imaging and provider notes     including most recent PFTs, six minute walk test and alpha 1 antitrypsin     testing.      Radiographic Results      Patient: STEPHANE HAJI   Acct: #[id number]   Report: #[id number]            UNIT #: [id number]    DOS:       LOCATION:[facility]     : 1965            PROVIDERS      ADMITTING:     FAMILY:  NATHAN LEYVA         OTHER:       DICTATING:  ETHAN REILLY DO            REASON FOR VISIT:  COPD            *******Signed******                                    [facility]                          Health Information Management Services                            [place], [place]  [address]               __________________________________________________________________________             Patient Name:                   Attending Physician:      Stephane Haji D.O.             Patient Visit # MR #            Admit Date  Disch Date     Location      [id number]    [id number]      2019                 [facility]- -             Date of Birth      1965      __________________________________________________________________________      821 -  DIAGNOSTIC REPORT             PULMONARY FUNCTION TEST             SPIROMETRY:      FEV1/FVC ratio 76.      FEV1 84% of predicted, 3.49 L.      FVC 86% of predicted, 4.62 L.      There was no one-time response to bronchodilator administration.             LUNG VOLUMES:      Total lung capacity 98% of predicted, 7.25 L.      Residual volume 125% of predicted, 2.75 L.             DIFFUSION CAPACITY:      DLCO 67% of predicted.             FLOW VOLUME LOOP:      Flow volume loops showed blunting of the inspiratory loop.             No previous pulmonary function test for comparison.             CONCLUSION:      1.  Normal spirometry without obstructive defect.      2.  Lung volumes show airtrapping.      3.  Diffusion capacity is mildly reduced.      4.  Blunting of the inspiratory loop.             Please correlate clinically.             To be electronically signed in [facility]      [id number] ETHAN REILLY D.O.             KM:vh      D:  03/19/2019 10:33      T:  03/19/2019 10:55      #[id number]             Until signed, this is an unconfirmed preliminary report that may contain      errors and is subject to change.                   Until signed, this is an unconfirmed preliminary report that may contain      errors and is subject to change.                     <Electronically signed by ETHAN REILLY DO>                03/19/19 1247               ETHAN REILLY DO                                                                  [facility]      D:03/19/19 1033            Assessment      Notes      New Medications      * Prazosin HCl 2 MG CAPSULE: 2 MG PO QDAY #60      * Lisinopril* 10 MG TABLET: 10 MG PO QDAY #30      * Budesonide/Formoterol Fumarate (Symbicort 160/4.5 Mcg) 10.2 GM INH: 2 PUFF INH      BID #1      * Nicotine Polacrilex (Nicorette) 2 MG GUM: 2 MG PO Q4-6H #180      * buPROPion SR (Wellbutrin SR) 150 MG TAB.SR.12H: 150 MG PO BID 30 Days #60         Instructions: Take 150mg po qd x 3 days, then increase to  150mg po bid.      * Fluticasone/Vilanterol 200-25 Mcg Inh (Breo Ellipta 200-25 Mcg Inh) 1 EACH       BLST.W.DEV: 1 PUFF INH QDAY 30 Days #1         Instructions: to replace symbicort 160      ASSESSMENT:      1. Chronic obstructive pulmonary disease without acute exacerbation.      2.  Ongoing tobacco abuse with cigarettes, smoking 1.5 to two packs per day     currently.      3. 5 mm lung nodules incidentally noted on an abdominal pelvis CT scan.             PLAN:      1.  I have discussed patient's test results with him.  I discussed with him that    he does have some airtrapping and decreased diffusion capacity on PFTs     consistent with early COPD and emphysema.        2. I have encouraged him to quit smoking and discussed smoking cessation with     him for five minutes today.  He would like to try nicotine gum and Wellbutrin.      He had GI symptoms with Chantix before and was not able to tolerate that.  I     have prescribed Wellbutrin and nicotine gum at his request. He is to let us know    if he has any issues with these or with taking them.      3.  I will have him continue on Symbicort 160 with two puffs twice a day. He has    failed treatment with Trelegy, Breo and Dulera.        4. He should already be scheduled for follow up CT scan in 01/2020 to follow up     pulmonary nodules.  We will confirm that and I will have him follow up in 3-4     months with Dr. Moraes's, sooner if needed.            Patient Education      Tobacco Cessation Counseling:  for 3 - 10 minutes      Patient Education Provided:  COPD, How to use an Inhaler, Smoking Cessation      Time Spent:  > 50% /[facility]                 Disclaimer: Converted document may not contain table formatting or lab diagrams. Please see [employer] for the authenticated document.

## 2021-05-28 NOTE — PROGRESS NOTES
Patient: STEPHANE MCKEON     Acct: [id number]     Report: #[id number]  UNIT #: [id number]     : 1965    Encounter Date:2021  PRIMARY CARE: Eugenio Espinoza  ***Signed***  --------------------------------------------------------------------------------------------------------------------  NURSE INTAKE      Visit Type      Established Patient Visit            Chief Complaint      lung ca f/u      Intent of Therapy:  Curative            Referring Provider/Copies To      Referring Provider:  SIOMARA SIU      Copies To:   CARLOS OVALLES            History and Present Illness      Past Oncology Illness History      Patient presents for discussion of treatment options for his clear cell carcin    bruce the lung.  Patient reports that he was diagnosed with pneumonia twice since     2020.  He was treated with antibiotics.  His cough improved but did not    ever completely go away.  This eventually led to a CT scan which demonstrated a     mass in the right hilar area.  He was then referred to pulmonology.  He     underwent bronchoscopy with biopsy of the right lower lobe 2021 which     showed squamous cell carcinoma.  Lymph node sampling was negative in the 11 L     and 7 station but positive in the 11 R station (N1).  He then underwent PET scan    and MRI of the brain. He does have inhalers and nebulizer for underlying COPD.      He is working toward smoking cessation and is down from 2.5 packs/day to 1     pack/day.  His PET scan did show some questionable area in the mouth.  He     reports seeing ENT yesterday with a negative evaluation.  21 Dr. Ovalles     performed Right VATS exploration with posterior lateral thoracotomy and lower     and middle bilobectomy. 21 patient presents with post op surgical pain and     requires addition time for healing before proceeding with IV chemotherapy. EGFR     on the pathology from  will be requested.            HPI - Oncology Interim       Patient is seen today for follow-up of his squamous cell lung cancer.  He is     approximately a month out from his surgery.  He continues to have a lot of     postthoracotomy pain on the right chest.  He was seen by Dr. Simpson earlier     today who is adjusting his gabapentin for post-thoracotomy pain.  He reports     that his breathing is better.  He is still smoking but is trying to work toward     cessation.  He reports good appetite and his weight is maintained.  He denies     any masses or adenopathy.  No other unusual aches or pains.  He is able to     perform his ADLs.            Cancer Details            Squamous cell carcinoma.  Right lower lobe.            Metastatic Sites      Lung            Clinical Staging      T2a, N1, M0 (stage IIb); 2/23/21 Stage IIb (pT2, PN 1, M0) squamous cell     carcinoma with high risk features            Treatments      Surgeries      2/23/21 Right VATS exploration with posterior lateral thoracotomy and lower and     middle bilobectomy            Clinical Trial Participant      No            ECOG Performance Status      0            Most Recent Lab Findings      Laboratory Tests      4/13/21 15:44            PAST, FAMILY   Past Medical History      Past Medical History:  COPD, Depression, Heart Attack, High Cholesterol, Mental     Disease, Short of Air      Hematology/Oncology (M):  Lung Cancer      Genetic/Metabolic:  None            Past Surgical History      Lung Biopsy            Family History      Family History:  No Family History            Patient is adopted and does not know his biologic family history            Social History      Marital Status:        Lives independently:  Yes      Occupation:  DISABLED            Tobacco Use      Tobacco status:  Current every day smoker (1.5 ppd x 40 years, 1 ppd currently)      Smoking packs/day:  1      Currently Vaping:  No      Smoking history:  > 50 pack years            Alcohol Use      Alcohol intake:  None             Substance Use      Substance use:  Painkillers            REVIEW OF SYSTEMS      General:  Denies: Fever, Weight Loss      Eye:  Denies Corrective Lenses      Respiratory:  Admits: Shortness of Air      Gastrointestinal:  Denies Diarrhea, Denies Nausea/Vomiting      Musculoskeletal:  Denies Muscle Pain      Neurologic:  Denies Dizziness, Denies Numbness\Tingling      Psychiatric:  Admits Depression            VITAL SIGNS AND SCORES      Vitals      Weight 180 lbs 1.853 oz / 81.7 kg      Temperature 98 F / 36.67 C - Temporal      Pulse 84      Respirations 20      Blood Pressure 121/83 Sitting      Pulse Oximetry 95%, rm air            Pain Score      Pain Scale Used:  Numerical      Pain Intensity:  0            Fatigue Score      Fatigue (0-10 scale):  0 (none)            Rehab to be Ordered      Type of Referral to be Ordered:  No needs identified            EXAM      General Appearance:  Positive for: Alert, Cooperative;          Negative for: Acute Distress      Eye:  Positive for: Anicteric Sclerae, Moist Conjunctiva      Neck:  Positive for: Supple;          Negative for: JVD, Masses      Respiratory:  Positive for: CTAB, Normal Respiratory Effort      Other      Well-healed right thoracotomy incision      Abdomen/Gastro:  Positive for: Normal Active Bowel Sounds, Soft;          Negative for: Hepatosplenomegaly, Tenderness      Cardiovascular:  Positive for: RRR;          Negative for: Gallop, Murmur, Peripheral Edema, Rub      Psychiatric:  Positive for: Appropriate Affect, Intact Judgement      Lymphatic:  Negative for: Cervical, Infraclavicular, Supraclavicular            PREVENTION      Hx Influenza Vaccination:  Yes      Date Influenza Vaccine Given:  Dec 1, 2020      Influenza Vaccine Declined:  No      2 or More Falls in Past Year?:  No      Fall Past Year with Injury?:  No      Hx Pneumococcal Vaccination:  Yes      Encouraged to follow-up with:  PCP regarding preventative exams.      Chart  initiated by      sky ibanez ma            ALLERGY/MEDS      Allergies      Coded Allergies:             NO KNOWN DRUG ALLERGIES (Verified  Allergy, Unknown, 4/20/21)            Medications      Last Reconciled on 4/20/21 09:53 by FLACO CAMACHO      MDI-Ipratropium/Albuterol (Combivent Respimat) 4 Gm Inh      1 PUFF INH RTQID, #1 INH         Reported         2/4/21       Lisinopril* (Lisinopril*) 5 Mg Tablet      MG PO HS, #30 TAB 0 Refills         Reported         1/29/21       HYDROcodone-Acetaminophen 7.5-325 Mg (HYDROcodone-Acetaminophen 7.5-325 Mg) 1     Tab Tablet      1 TAB PO Q8H PRN for PAIN, TAB         Reported         1/29/21       MDI-Albuterol (Proair HFA) 8.5 Gm Hfa.aer.ad      2 PUFFS INH Q4-6H PRN for SHORTNESS OF BREATH, #1 MDI 5 Refills         Prov: Rome Simpson         1/28/21       hydrOXYzine HCL (hydrOXYzine HCL) 50 Mg Tablet      80 MG PO TID, #90 TAB 0 Refills         Reported         12/16/20       Budesonide/Formoterol Fumarate (Symbicort 160/4.5 Mcg) Unknown Strength Inh      INH RTBID, #1 INH 0 Refills         Reported         12/8/20       Aspirin Chew (Aspirin Baby) 81 Mg Tab.chew      81 MG PO QDAY, #30 TAB.CHEW 0 Refills         Reported         12/8/20       Tiotropium Bromide (Spiriva Respimat 2.5 mcg/Puff) 4 Gm Mist.inhal               Prov: BALTAZAR MUNGUIA [facility]         1/24/20       Rosuvastatin Calcium (Crestor*) 40 Mg Tablet      40 MG PO HS, #30 TAB 0 Refills         Reported         11/7/19       Omeprazole (priLOSEC) 10 Mg Capsule.dr      40 MG PO QDAY, CAP         Reported         11/7/19       Prazosin HCl (Prazosin HCl) 2 Mg Capsule      2 MG PO QDAY, #60 CAP 0 Refills         Reported         7/1/19       QUEtiapine XR (SEROquel XR) 300 Mg Tab.er.24h      300 MG PO HS for 30 Days, #30 TAB.SR         Reported         6/29/17       Metoprolol Tartrate (Metoprolol Tartrate) 25 Mg Tablet      25 MG PO BID, #90 TAB 0 Refills         Reported         6/29/17       Medications Reviewed:  No Changes made to meds            IMPRESSION/PLAN      Diagnosis      Primary cancer of right lower lobe of lung - C34.31      Status post resection on 2/23/2021.  I discussed with his Dr. Simpson who saw him    earlier today.  He states that he will need additional time from a wound healing    standpoint.  Based on NCCN guidelines, I would recommend adjuvant treatment with    chemotherapy.  For squamous histology, cisplatin 75 mg/m ² + Taxotere 75 mg/m ²     given once every 3 weeks for 4 cycles.  Side effects, risk and benefits     discussed with patient and family.  Written teaching information provided.  I     will obtain lab work at his next visit to ensure adequate endorgan functions and    blood counts.  I will request EGFR testing on his resected specimen.  I will see    him back in 1 month to reassess his wound and finalize adjuvant treatment     planning.  We discussed the need for smoking cessation and he will continue     working toward that goal.            Pain      Follow-up with PCP/Pain Management            Advanced Care Plan Discussion      Declines Discussion 1124F            Patient Education            All Forms of Smoking Are Bad for You      Cisplatin Injection      Docetaxel Injection      Patient Education Provided:  Yes            Tobacco Counseling      Tobacco Cessation Counseling:  for 3 - 10 minutes            Electronically signed by FLACO CAMACHO  04/20/2021 09:53       Disclaimer: Converted document may not contain table formatting or lab diagrams. Please see [employer] [facility] for the authenticated document.

## 2021-05-28 NOTE — PROGRESS NOTES
Patient: HOLLIS MCKEON     Acct: [id number]     Report: #[id number]  UNIT #: [id number]     : 1965    Encounter Date:2021  PRIMARY CARE: Eugenio Espinoza  ***Signed***  --------------------------------------------------------------------------------------------------------------------  History of Present Illness            Chief Complaint: f/up COPD, emphysema.             Hollis Mckeon is presenting for evaluation via Telehealth visit. Verbal     consent obtained before beginning visit.            PAST MEDICAL HISTORY/OVERVIEW OF PATIENT SYMPTOMS            Symptoms: none.            Any known Exposure to COVID-19: none.            Current every day smoker (1.5 ppd x 40 years, 1 ppd currently)            Provider spent 16 minutes with the patient during telehealth visit.            The following staff were present during this visit: Genesis Lindo MA, Janent AVERY            The patient is a 55 year old male patient of Dr. Moraes who is a everyday     smoker, smoking about one pack of cigarettes a day and has been started nicotine    gum and patches.  The patient also has a history of COPD with emphysema who     presents for TeleHealth follow up visit today. The patient had a repeat chest CT    scan due to having pneumonia to see if pneumonia had resolved and chest CT scan     was completed on 2021 and showed a new right hilar mass measuring 3.8 cm     in size.  This resulted in obstruction of the right lower lobe bronchus.     Findings are suspicious for neoplastic process and is recommending further     evaluation.  It also showed emphysema and a stable 1.5 cm fat density mass along    the right lobe of the liver.  The patient states that since last visit his     breathing is doing well. The patient states at times he will get short of breath    that is worse with exertion, moderate in severity and improved with rest. The     patient states he is taking Symbicort  and Spiriva everyday as prescribed and     uses albuterol inhaler and DuoNebs as needed. The patient currently denies any     fever, chills, night sweats, hemoptysis, purulent sputum production, chest pain,    chest tightness, swollen glands in the head and neck, abdominal pain, nausea,     vomiting or diarrhea.   The patient denies any headaches, myalgias, sore throat,    changes in sense of taste and/or smell or any other coronavirus or flu-like     symptoms.  The patient states he has had some weight loss. The patient weighed     202 pounds back in 01/2020. The patient states he weight himself a couple of     days ago and he weighs 174 pounds.  The patient states he has just not had much     of an appetite and has not been eating much.  The patient denies any history of     malignancies or lung cancer with himself.  The patient states he was adopted,     however he does speak periodically with his biologic mother and believes there     is a cancer history, however he is not sure.  The patient states he is able to     perform his ADLs without difficulty.              I have personally reviewed the review of systems, past family, social, surgical     and medical histories and I agree with those as entered in the chart.              Physical exam deferred due to TeleHealth visit.              I reviewed patient's noncontrast chest CT report dated for 01/20/2021.            I reviewed my last TeleHealth visit note.              Allergies and Medications      Allergies:        Coded Allergies:             NO KNOWN DRUG ALLERGIES (Verified  Allergy, Unknown, 1/21/21)      Medications    Last Reconciled on 1/21/21 11:00 by BALTAZAR MUNGUIA       Nebulizer/Compressor (Nebulizer) 1 Each Each      EACH XX ONCE, #1 0 Refills         Prov: [facility]AR [facility]UNGUIA [facility]         12/24/20       Albuterol/Ipratropium (Duoneb) 3 Ml Ampul.neb      3 ML INH Q4H PRN for SHORTNESS OF BREATH, #120 NEB 5 Refills         [facility]         12/24/20       Nicotine Polacrilex (Nicorette) 2 Mg Gum      2 MG PO Q4-6H, #180 BOX         Prov: BAL[facility]AR MUNGUIA [facility]         12/16/20       hydrOXYzine HCL (hydrOXYzine HCL) 50 Mg Tablet      50 MG PO TID, #90 TAB 0 Refills         Reported         12/16/20       Budesonide/Formoterol Fumarate (Symbicort 160/4.5 Mcg) Unknown Strength Inh      INH RTBID, #1 INH 0 Refills         Reported         12/8/20       Aspirin Chew (Aspirin Baby) 81 Mg Tab.chew      81 MG PO QDAY, #30 TAB.CHEW 0 Refills         Reported         12/8/20       (nebulizer tubing)   No Conflict Check      #1         Prov: [facility]UNGUIA [facility]         2/3/20       Tiotropium Bromide (Spiriva Respimat 2.5 mcg/Puff) 4 Gm Mist.inhal               Prov: [facility]AR [facility]UNGUIA [facility]         1/24/20       MDI-Albuterol (Proair HFA) 8.5 Gm Hfa.aer.ad      2 PUFFS INH Q4-6H PRN for SHORTNESS OF BREATH, #1 MDI 5 Refills         Prov: BALTAZAR MUNGUIA [facility]         11/7/19       Rosuvastatin Calcium (Crestor*) 40 Mg Tablet      40 MG PO HS, #30 TAB 0 Refills         Reported         11/7/19       Omeprazole (priLOSEC) 10 Mg Capsule.dr      20 MG PO QDAY, CAP         Reported         11/7/19       Prazosin HCl (Prazosin HCl) 2 Mg Capsule      2 MG PO QDAY, #60 CAP 0 Refills         Reported         7/1/19       Nitroglycerin (Nitrostat*) 0.4 Mg Tablet      0.4 MG SL ASDIR, #25 TAB 1 Refill         Reported         6/29/17       QUEtiapine XR (SEROquel XR) 300 Mg Tab.er.24h      300 MG PO HS for 30 Days, #30 TAB.SR         Reported         6/29/17       Metoprolol Tartrate (Metoprolol Tartrate) 25 Mg Tablet      12.5 MG PO BID, #90 TAB 0 Refills         Reported         6/29/17       HYDROcodone-Acetaminophen 5-325 Mg (HYDROcodone-Acetaminophen 5-325 Mg) 1 Each     Tablet      1 TAB PO Q4-6H PRN for BREAKTHROUGH PAIN, TAB 0 Refills         Reported         6/29/17            Plan      Orders:  Phone Eval 11-20 mi [id number]       Instructions      * Chronic conditions reviewed and taken in consideration for today's treatment       plan.      * Plan Of Care: ()      * Patient instructed to seek medical attention urgently for new or worsening       symptoms.      * Patient was educated/instructed on their diagnosis, treatment and medications       today.      * Recommend self monitoring. Instructions given.      * Recommend self quarantine for 14 days.      * Recommend self quarantine until without fever for 72 hours without using fever       reducing medications.      * Recommends over the counter medications for symptom management.            ASSESSMENT:       1. COPD with emphysema, patient on triple inhaler therapy.      2.  New right hilar mass measuring 3.8 cm in size on recent chest CT scan.      3. Bacterial pneumonia from unspecified organism.      4. 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 Villa.      5. History of lung nodule.      6. Tobacco abuse with cigarettes that is ongoing.      7. Weight loss.            PLAN:      1.  Continue Symbicort and Spiriva everyday as prescribed and rinse his mouth     out after each use.      2.  The patient to continue albuterol inhaler, nebulizer and DuoNebs as needed.      3.  The patient's recent chest CT scan is showing a new right hilar mass     measuring 3.8 cm in size. I will order a PET scan to be completed as soon as     possible.      4. I will send patient for endobronchial ultrasound/bronchoscopy with     bronchoalveolar lavage, brushings and biopsies. The risks and benefits were     discussed with the patient and the patient is willing to undergo the procedure.      5.  I spent three minutes today counseling the patient on smoking cessation.  I     counseled the patient on the risks of continued smoking including the risk of     lung cancer, head and neck cancer, renal cancer, heart disease, stroke, and     early death. The patient  has nicotine patches and nicotine gum at home and     patient is advised to continue using. The patient is also advised to decrease     the number of cigarettes he is smoking up to the point to where he can quit.      6.  The patient is up to date on flu and pneumonia vaccines.  The patient is a    dvised to receive the COVID19 vaccine when it becomes available and to continue     to follow CDC recommendations of social distancing, wearing a mask and washing     hands for at least 20 seconds.        7.  Patient is advised to call the office, 911 or go to the ER with any new or     worsening symptoms.      8.  Follow up with Dr. Medina after endobronchial ultrasound and PET scan     completed, sooner if needed.              CONSENT:  Endobronchial ultrasound/bronchoscopy with bronchoalveolar lavage,     brushings and biopsies.             RISKS:  I have discussed the risks of the procedure with the patient including      pneumothorax, hemothorax, bleeding, hypoxia, required mechanical      ventilation and death.  The patient recognizes these findings,       acknowledges these findings and is agreeable to the procedure.            Electronically signed by BALTAZAR MUNGUIA [facility]  01/22/2021 16:43       Disclaimer: Converted document may not contain table formatting or lab diagrams. Please see [employer] [facility] for the authenticated document.

## 2021-05-28 NOTE — PROGRESS NOTES
Patient: STEPHANE MCKEON     Acct: [id number]     Report: #[id number]  UNIT #: [id number]     : 1965    Encounter Date:2021  PRIMARY CARE: [facility]  ***Signed***  --------------------------------------------------------------------------------------------------------------------  Chief Complaint      Encounter Date      2021            Primary Care Provider      [facility]            Patient Complaint      Patient is complaining of      Patient is here for a f/up on COPD, emphysema            VITALS      Height 6 ft 0 in / 182.88 cm      Weight 194 lbs 0 oz / 87.911700 kg      BSA 2.10 m2      BMI 26.3 kg/m2      Temperature 97.6 F / 36.44 C - Temporal      Pulse 89      Respirations 18      Blood Pressure 118/66 Sitting, Left Arm      Pulse Oximetry 96%, room air            HPI      The patient is a 55 year old male with chronic obstructive pulmonary disease,     emphysema, new right hilar mass measuring 3.8 cm in size, liver lesion, question    of lipoma versus pseudolipoma, history of lung nodule, tobacco abuse of     cigarettes and weight loss. He had a PET scan and he is here for follow up     today.             He is planned for endobronchial ultrasound guided lymph node biopsy tomorrow     with Dr. Medina. He has cut down on his cigarette smoking to 1 pack per day. He    is currently taking Symbicort and albuterol 2-3 times a day. He has been having     some weight loss and chest discomfort on the right. He has no fever or chills,     no nausea or vomiting.            ROS      Constitutional:  Denies: Fatigue, Fever, Weight gain, Weight loss, Chills,     Insomnia, Other      Respiratory/Breathing:  Complains of: Cough; Denies: Shortness of air, Wheezing,    Hemoptysis, Pleuritic pain, Other      Endocrine:  Denies: Polydipsia, Polyuria, Heat/cold intolerance, Diabetes, Other      Eyes:  Denies: Blurred vision, Vision Changes, Other      Ears, nose, mouth,  throat:  Denies: Congestion, Dysphagia, Hearing Changes, Nose    Bleeding, Nasal Discharge, Throat pain, Tinnitus, Other      Cardiovascular:  Denies: Chest Pain, Exertional dyspnea, Peripheral Edema,     Palpitations, Syncope, Wake up Gasping for air, Orthopnea, Tachycardia, Other      Gastrointestinal:  Denies: Abdominal pain/cramping, Bloody stools, Constipation,    Diarrhea, Melena, Nausea, Vomiting, Other      Genitourinary:  Denies: Dysuria, Urinary frequency, Incontinence, Hematuria,     Urgency, Other      Musculoskeletal:  Denies: Joint Pain, Joint Stiffness, Joint Swelling, Myalgias,    Other      Hematologic/lymphatic:  DENIES: Lymphadenopathy, Bruising, Bleeding tendencies,     Other      Neurologic:  Denies: Headache, Numbness, Weakness, Seizures, Other      Psychiatric:  Denies: Anxiety, Appropriate Effect, Depression, Other      Sleep:  No: Excessive daytime sleep, Morning Headache?, Snoring, Insomnia?, Stop    breathing at sleep?, Other      Integumentary:  Denies: Rash, Dry skin, Skin Warm to Touch, Other            FAMILY/SOCIAL/MEDICAL HX      Surgical History:  Yes: Other Surgeries      Smoking status:  Current every day smoker (1.5 ppd x 40 years, 1 ppd currently)      Anticoagulation Therapy:  No      Antibiotic Prophylaxis:  No      Medical History:  Yes: Chronic Bronchitis/COPD (INHALER), Depression, Anxiety,     Bipolar Disorder, PTSD, Heart Attack (2013- 2 STENTS PLACED SEES DR. OATES     STRESS TEST 5/2017-OK), High Blood Pressure, Reflux Disease, Shortness Of     Breath; No: Blood Disease, Chemotherapy/Cancer, Deafness or Ringing Ears,     Seizures, Hemorrhoids/Rectal Prob, Sinus Trouble, Miscellaneous Medical/oth      Psychiatric History      depression, anxiety, bipolar disorder, PTSD            PREVENTION      Hx Influenza Vaccination:  Yes      Date Influenza Vaccine Given:  Dec 1, 2020      Influenza Vaccine Declined:  No      2 or More Falls in Past Year?:  No      Fall Past Year  with Injury?:  No      Hx Pneumococcal Vaccination:  Yes      Encouraged to follow-up with:  PCP regarding preventative exams.      Chart initiated by      Genesis Lindo MA            ALLERGIES/MEDICATIONS      Allergies:        Coded Allergies:             NO KNOWN DRUG ALLERGIES (Verified  Allergy, Unknown, 1/28/21)      Medications    Last Reconciled on 1/28/21 11:31 by ROME SIMPSON MD      Budesonide/Formoterol Fumarate (Symbicort 160/4.5 Mcg) 10.2 Gm Inh      2 PUFF INH BID, #1 INH 9 Refills         Prov: Rome Simpson         1/28/21       MDI-Albuterol (Proair HFA) 8.5 Gm Hfa.aer.ad      2 PUFFS INH Q4-6H PRN for SHORTNESS OF BREATH, #1 MDI 5 Refills         Prov: Rome Simpson         1/28/21       Nebulizer/Compressor (Nebulizer) 1 Each Each      EACH XX ONCE, #1 0 Refills         Prov: BALTAZAR MUNGUIA [facility]         12/24/20       Albuterol/Ipratropium (Duoneb) 3 Ml Ampul.neb      3 ML INH Q4H PRN for SHORTNESS OF BREATH, #120 NEB 5 Refills         Prov: BALTAZAR MUNGUIA [facility]         12/24/20       hydrOXYzine HCL (hydrOXYzine HCL) 50 Mg Tablet      50 MG PO TID, #90 TAB 0 Refills         Reported         12/16/20       Budesonide/Formoterol Fumarate (Symbicort 160/4.5 Mcg) Unknown Strength Inh      INH RTBID, #1 INH 0 Refills         Reported         12/8/20       Aspirin Chew (Aspirin Baby) 81 Mg Tab.chew      81 MG PO QDAY, #30 TAB.CHEW 0 Refills         Reported         12/8/20       (nebulizer tubing)   No Conflict Check      #1         Prov: BALTAZAR MUNGUIA [facility]         2/3/20       Tiotropium Bromide (Spiriva Respimat 2.5 mcg/Puff) 4 Gm Mist.inhal               Prov: BALTAZAR MUNGUIA [facility]         1/24/20       Rosuvastatin Calcium (Crestor*) 40 Mg Tablet      40 MG PO HS, #30 TAB 0 Refills         Reported         11/7/19       Omeprazole (priLOSEC) 10 Mg Capsule.dr      20 MG PO QDAY, CAP         Reported         11/7/19       Prazosin HCl (Prazosin HCl) 2 Mg Capsule      2 MG PO QDAY, #60 CAP 0  Refills         Reported         7/1/19       Nitroglycerin (NITROSTAT) 0.4 Mg Tablet      0.4 MG SL ASDIR, #25 TAB 1 Refill         Reported         6/29/17       QUEtiapine XR (SEROquel XR) 300 Mg Tab.er.24h      300 MG PO HS for 30 Days, #30 TAB.SR         Reported         6/29/17       Metoprolol Tartrate (Metoprolol Tartrate) 25 Mg Tablet      12.5 MG PO BID, #90 TAB 0 Refills         Reported         6/29/17       HYDROcodone-Acetaminophen 5-325 Mg (HYDROcodone-Acetaminophen 5-325 Mg) 1 Each     Tablet      1 TAB PO Q4-6H PRN for BREAKTHROUGH PAIN, TAB 0 Refills         Reported         6/29/17      Current Medications      Current Medications Reviewed 1/28/21            EXAM      VITAL SIGNS:  Reviewed.        GENERAL: Obese, well developed, well nourished, in no acute distress.        NECK:  Supple without tracheal deviation or lymphadenopathy.  No thyromegaly     appreciated.      LYMPHATICS:  No cervical or supraclavicular lymphadenopathy.      HEENT: Pupils are equal, round and reactive to light. There is no scleral     icterus.  Nares patent without hypertrophy of the turbinates. No erythema of the    passages.  TMs are clear bilaterally with good cone of light. The posterior     pharynx is without  lesions or erythema.      RESPIRATORY:  Decreased breath sounds throughout, no wheezes, rales or rhonchi     appreciated, normal work of breathing noted, patient able to speak full     sentences without difficulty.       CARDIOVASCULAR:  Regular rate and rhythm.  No murmurs, gallops or rubs.  No     lower extremity edema.  Equal radial pulses.        GI: Soft, nontender, nondistended, no organomegaly.  Bowel sounds present in all    four quadrants.      MUSCULOSKELETAL:  No joint effusions, erythema or warmth over the major joint     systems.      SKIN:  No rashes or lesions.      NEUROLOGIC: Cranial nerves II-XII are intact bilaterally.  Moves all     extremities. Ambulates with ease.      PSYCH:   Appropriate mood and affect.      Vitals      Vitals:             Height 6 ft 0 in / 182.88 cm           Weight 194 lbs 0 oz / 87.308153 kg           BSA 2.10 m2           BMI 26.3 kg/m2           Temperature 97.6 F / 36.44 C - Temporal           Pulse 89           Respirations 18           Blood Pressure 118/66 Sitting, Left Arm           Pulse Oximetry 96%, room air            REVIEW      Results Reviewed      PCCS Results Reviewed?:  Yes Prev Lab Results, Yes Prev Radiology Results, Yes     Previous Mecial Records      Radiographic Results               [facility]                PACS RADIOLOGY REPORT            Patient: STEPHANE MCKEON   Acct: #[id number]   Report: #[id number]            UNIT #: [id number]    DOS: 21 1509      INSURANCE:BLUE MEDICAID   ORDER #:[id number]      LOCATION:PET     : 1965            PROVIDERS      ADMITTING:     ATTENDING: BALTAZAR MUNGUIA      FAMILY:  Eugenio Espinoza   ORDERING[facility]  [facility]         OTHER: Eugenio Espinoza   DICTATING:  Hugh Rojas MD            REQ #:[id number]   EXAM:[id number] - SKULL BASE-MIDTHIGH INIT fdg      REASON FOR EXAM:  R91.8      REASON FOR VISIT:  R91.8            *******Signed******         PROCEDURE:   PET CT SKULL BASE TO MID THIGH INITIAL             COMPARISON:   [facility], CT, CHEST W/O CONTRAST,     2021, 12:15.  [facility], CR, CHEST AP/PA 1 VIEW, 2020, 10:02.  [facility], CT,       ABDOMEN W/ CONTRAST, 2020, 13:15.  [facility], CT, CHEST W/O     CONTRAST,       2019, 13:17.             INDICATIONS:   LUNG MASS             TECHNIQUE:   After obtaining the patient's consent, F-18 FDG was administered     intravenously.  A PET       scan with concurrent CT imaging was performed from the skull to the thigh with     multiplanar imaging.              RADIONUCLIDE:     12.53 MCI   F18 FDG- I.V.      LABS:                          Blood Glucose 88 mg/dl      UPTAKE TIME:        59 mins      MEDIASTINAL BACKGROUND UPTAKE:  SUV MAX 1.8.             FINDINGS:         Marked activity in the mouth is probably due to muscular activity, direct     visualization is       recommended.             Rounded 3.8 cm lobulated mass is again seen in the right hilum.  SUV max 17.2.      The right lower       lobe bronchus is occluded.             No abnormal uptake is seen in the abdomen and pelvis.  No abnormal skeletal     uptake is evident.             CONCLUSION:         PET-CT demonstrating marked activity in the mouth probably due to muscular     activity, direct       visualization is recommended.             3.8 cm lobulated hypermetabolic mass, right hilum.              LEOPOLDO ORDAZ MD             Electronically Signed and Approved By: LEOPOLDO ORDAZ MD on 2021 at 16:40                                  Until signed, this is an unconfirmed preliminary report that may contain      errors and is subject to change.                                              COUST:      D:21 [address] [place] [address] Diagnostic Img                PACS RADIOLOGY REPORT            Patient: STEPHANE MCKEON   Acct: #[id number]   Report: #[id number]            UNIT #: [id number]    DOS: 21 1200      INSURANCE:BLUE MEDICAID   ORDER #:[id number]      LOCATION:NAEL     : 1965            PROVIDERS      ADMITTING:     ATTENDING: BALTAZAR MUNGUIA      FAMILY:  NONE,MD   ORDERING:  BALTAZAR MUNGUIA         OTHER:    DICTATING:  Noe Wolfe MD            REQ #:[id number]   EXAM:[facility] - CT CHEST without CONTRAST      REASON FOR EXAM:        REASON FOR VISIT:  PNEUMONIA/NICOTINE DEP            *******Signed******         PROCEDURE:   CT CHEST WITHOUT CONTRAST             COMPARISON:   [facility]  [facility], CR, CHEST AP/PA 1 VIEW, 12/09/2020,     10:02.  [facility], CT, CHEST W/O CONTRAST, 12/20/2019, 13:17.             INDICATIONS:   ABNORMAL CHEST XRAY. SOA. SMOKER.             TECHNIQUE:   CT images were created without the administration of contrast     material.               PROTOCOL:     Standard imaging protocol performed                RADIATION:     DLP: 307.5mGy*cm          Automated exposure control was utilized to minimize radiation dose.              FINDINGS:         There are no pathologically enlarged mediastinal, axillary, or left hilar nodes.     There is a new       macrolobular right hilar mass measuring 3.7 x 3.8 cm in size.  This results in     obstruction of the       proximal left lower lobe bronchus.  There is lobular soft tissue within the     anterior right lower       lobe along the major fissure.  There is right lower lobe airspace disease and     peribronchial       thickening.  There are multiple ground-glass type nodules within the right lower    lobe consistent       with an infectious or inflammatory process.  The right hilar mass is suspicious     for neoplastic       process .  PET-CT scan may be useful for further evaluation.  Endoscopy and     biopsy should also be       considered if this has not yet been performed.  Left lung is clear.  There is     emphysematous change       of the lungs.             There is a stable 1.5 cm fat density mass along the capsule of the right lobe of    the liver.  This       is unchanged from prior studies.  Bilateral adrenal glands and other visualized     portions of the       upper abdomen are unremarkable.  No lytic or sclerotic bony lesions are seen.             CONCLUSION:         1. New right hilar mass measuring 3.8 cm in size.  This results in obstruction     of the right lower       lobe bronchus.  Findings are suspicious for neoplastic process and PET scan may     be useful for       further evaluation.   Endoscopy and biopsy may also be useful if this has not yet    been performed.        There is airspace disease as well as ground-glass type nodules within the right     lower lobe which       may be the result of postobstructive pneumonia.  No mediastinal adenopathy     identified.      2. Stable 1.5 cm fat density mass along the right lobe of the liver.      3. Emphysema              STAR BOWIE MD             Electronically Signed and Approved By: STAR BOWIE MD on 2021 at 13:04                                  Until signed, this is an unconfirmed preliminary report that may contain      errors and is subject to change.                                              STEBA:      D:21 1304      PFT Results      Patient: HOLLIS MCKEON YUMIKO   Acct: #[id number]   Report: #[id number]            UNIT #: [id number]    DOS:       LOCATION:[facility]     : 1965            PROVIDERS      ADMITTING:     FAMILY:  NATHAN CHANEYJANAY AHMED         OTHER:       DICTATING:  ETHAN REILLY DO            REASON FOR VISIT:  COPD            *******Signed******                                    [facility]                          [employer]                            [place], [place]  [address]               __________________________________________________________________________             Patient Name:                   Attending Physician:      Hollis Mckeon D.O.             Patient Visit # MR #            Admit Date  Disch Date     Location      [id number]    [id number]      2019                 [facility]- -             Date of Birth      1965      __________________________________________________________________________      821 - DIAGNOSTIC REPORT             PULMONARY FUNCTION TEST             SPIROMETRY:      FEV1/FVC ratio 76.      FEV1 84% of predicted, 3.49 L.      FVC 86% of predicted, 4.62 L.      There was no  one-time response to bronchodilator administration.             LUNG VOLUMES:      Total lung capacity 98% of predicted, 7.25 L.      Residual volume 125% of predicted, 2.75 L.             DIFFUSION CAPACITY:      DLCO 67% of predicted.             FLOW VOLUME LOOP:      Flow volume loops showed blunting of the inspiratory loop.             No previous pulmonary function test for comparison.             CONCLUSION:      1.  Normal spirometry without obstructive defect.      2.  Lung volumes show airtrapping.      3.  Diffusion capacity is mildly reduced.      4.  Blunting of the inspiratory loop.             Please correlate clinically.             To be electronically signed in [employer]      [id number] ETHAN REILLY D.O.             KM:      D:  03/19/2019 10:33      T:  03/19/2019 10:55      #[id number]             Until signed, this is an unconfirmed preliminary report that may contain      errors and is subject to change.                   Until signed, this is an unconfirmed preliminary report that may contain      errors and is subject to change.                     <Electronically signed by ETHAN REILLY DO>                03/19/19 1247               ETHAN REILLY DO                                                                  [facility]      D:03/19/19 1033            Assessment      Lung mass - R91.8            Notes      New Medications      * Budesonide/Formoterol Fumarate (Symbicort 160/4.5 Mcg) 10.2 GM INH: 2 PUFF INH      BID #1      Renewed Medications      * MDI-Albuterol (Proair HFA) 8.5 GM HFA.AER.AD: 2 PUFFS INH Q4-6H PRN SHORTNESS       OF BREATH #1      Discontinued Medications      * Nicotine Polacrilex (Nicorette) 2 MG GUM: 2 MG PO Q4-6H #180      New Diagnostics      * PFT Comp PrePost DLCO BodBx sx, Week         Dx: Lung mass - R91.8      * 6 Min Walk w O2 Titration Test, Routine         Dx: Lung mass - R91.8      PLAN:      The patient is a 55 year old male with chronic heavy smoking,  chronic     obstructive pulmonary disease and right hilar mass obstructing the right lower     lobe.             1. Right hilar mass obstructing right lower lobe concerning for malignancy. He     is scheduled for bronchoscopy tomorrow with Dr. Medina.       2. I will check pulmonary function test and 6 minute walk test. He is likely not    a surgical candidate but we will expedite the lung function test now. He will     likely need MRI of the brain after the bronchoscopy.       3. The liver lesion did not light up on PET scan and is likely not malignant.       4. I refilled his medications including Symbicort and albuterol.       5. Continue with nicotine patches. Smoking cessation counseling was done for     more than 5 minutes.        6. He should keep himself as active as possible.       7. Follow up in 1 week with BENNIE Gonzalez if needed.            Patient Education      Education resources provided:  Yes      Patient Education Provided:  Acute Bronchitis            Electronically signed by Rome Simpson  02/09/2021 20:58       Disclaimer: Converted document may not contain table formatting or lab diagrams. Please see [employer] for the authenticated document.

## 2021-05-28 NOTE — PROGRESS NOTES
Patient: STEPHANE MCKEON     Acct: [id number]     Report: #[id number]  UNIT #: [id number]     : 1965    Encounter Date:2021  PRIMARY CARE: Eugenio Espinoza  ***Signed***  --------------------------------------------------------------------------------------------------------------------  Encounter Date      2021      LUNG CANCER            History of Present Illness      This is a pleasant 56-year-old male who presents to the thoracic surgical clinic    today for routine follow-up after undergoing a right middle and lower     bilobectomy for stage IIb squamous cell carcinoma.  Since we last saw him he is     overall doing well from a breathing standpoint and denies any shortness of     breath or dyspnea on exertion however continues to experience postthoracotomy     pain with radiation to the anterior right hemithorax.  He otherwise denies     fever, chills            Procedure: Right VATS exploration with posterior lateral thoracotomy and lower     and middle bilobectomy            Pathology: Stage IIb squamous cell carcinoma with high risk features            ALLERGY/MEDS      Allergies      Coded Allergies:             NO KNOWN DRUG ALLERGIES (Verified  Allergy, Unknown, 21)            Medications      Last Reconciled on 21 13:16 by FLACO CAMACHO      MDI-Ipratropium/Albuterol (Combivent Respimat) 4 Gm Inh      1 PUFF INH RTQID, #1 INH         Reported         21       Lisinopril* (Lisinopril*) 5 Mg Tablet      MG PO HS, #30 TAB 0 Refills         Reported         21       HYDROcodone-Acetaminophen 7.5-325 Mg (HYDROcodone-Acetaminophen 7.5-325 Mg) 1     Tab Tablet      1 TAB PO Q8H PRN for PAIN, TAB         Reported         21       MDI-Albuterol (Proair HFA) 8.5 Gm Hfa.aer.ad      2 PUFFS INH Q4-6H PRN for SHORTNESS OF BREATH, #1 MDI 5 Refills         Prov: Rome Simpson         21       hydrOXYzine HCL (hydrOXYzine HCL) 50 Mg Tablet      80 MG PO TID,  #90 TAB 0 Refills         Reported         12/16/20       Budesonide/Formoterol Fumarate (Symbicort 160/4.5 Mcg) Unknown Strength Inh      INH RTBID, #1 INH 0 Refills         Reported         12/8/20       Aspirin Chew (Aspirin Baby) 81 Mg Tab.chew      81 MG PO QDAY, #30 TAB.CHEW 0 Refills         Reported         12/8/20       Tiotropium Bromide (Spiriva Respimat 2.5 mcg/Puff) 4 Gm Mist.inhal               Prov: [facility]UNGUIA [facility]         1/24/20       Rosuvastatin Calcium (Crestor*) 40 Mg Tablet      40 MG PO HS, #30 TAB 0 Refills         Reported         11/7/19       Omeprazole (priLOSEC) 10 Mg Capsule.dr      40 MG PO QDAY, CAP         Reported         11/7/19       Prazosin HCl (Prazosin HCl) 2 Mg Capsule      2 MG PO QDAY, #60 CAP 0 Refills         Reported         7/1/19       QUEtiapine XR (SEROquel XR) 300 Mg Tab.er.24h      300 MG PO HS for 30 Days, #30 TAB.SR         Reported         6/29/17       Metoprolol Tartrate (Metoprolol Tartrate) 25 Mg Tablet      25 MG PO BID, #90 TAB 0 Refills         Reported         6/29/17      Medications Reviewed:  No Changes made to meds            Shortness of Air, Other (PAIN IN R SIDE OF CHEST)            Yes: Other Surgeries      Smoking status:  Current every day smoker (1.5 ppd x 40 years, 1 ppd currently)      Smoking packs/day:  1      Smoking history:  > 50 pack years      Alcohol intake:  None      Medical History:  Yes: Arthritis (BACK), Chronic Bronchitis/COPD (INHALER),     Depression, Anxiety, Bipolar Disorder, PTSD, Heart Attack (2013- 2 STENTS PLACED    SEES DR. OATES STRESS TEST 5/2017-OK), High Blood Pressure, Reflux Disease,     Shortness Of Breath; No: Blood Disease, Chemotherapy/Cancer, Deafness or Ringing    Ears, Seizures, Hemorrhoids/Rectal Prob, Sinus Trouble, Miscellaneous     Medical/oth            Vitals      Weight 180 lbs 1.853 oz / 81.7 kg      Temperature 98.0 F / 36.67 C - Temporal      Pulse 84      Respirations 20      Blood  Pressure 121/83 Sitting, Left Arm      Pulse Oximetry 95%, RM AIR            General Appearance:  Alert, Oriented X3      HEENT:  Orophraynx clear, No Erythema      Neck:  Supple, No Masses or JVD      Respiratory:  CTAB, Other (Thoracotomy incision healing well however it is     hyperesthetic to touch)      Abdomen:  Soft, No NABS, No Masses, No Hernias, No Hepatosplenomegaly      Cardiovascular:  No Chest Tenderness; Regular Rate and Rhythm      Lymphatic:  No Neck      Extremeties:  Pulses Positive all 4 Ext      Neurological:  Mental Status WNL      Skin:  No Rash, No Cellulitis      Psychiatric:  Appropriate Affect            Overall Mr. Haji is progressing well.  We will increase his gabapentin to     600 mg p.o. 3 times daily to help with his neuropathic postthoracotomy pain.  We    will also refill his oxycodone and have encouraged him to continue to use     ibuprofen as well as Tylenol for pain.  The patient will be seen by Dr. King     of medical oncology today for discussion of adjuvant therapy.  We will plan to     see him in 4 months with a CT of the chest for continued surveillance            PREVENTION      Hx Influenza Vaccination:  Yes      Date Influenza Vaccine Given:  Dec 1, 2020      Influenza Vaccine Declined:  No      2 or More Falls in Past Year?:  No      Fall Past Year with Injury?:  No      Hx Pneumococcal Vaccination:  Yes      Encouraged to follow-up with:  PCP regarding preventative exams.      Chart initiated by      ELIO WRIGHT MA            Electronically signed by CARLOS OVALLES  04/20/2021 09:07       Disclaimer: Converted document may not contain table formatting or lab diagrams. Please see [email or web address] for the authenticated document.

## 2021-06-01 ENCOUNTER — TRANSCRIBE ORDERS (OUTPATIENT)
Dept: CARDIOLOGY | Facility: CLINIC | Age: 56
End: 2021-06-01

## 2021-06-01 ENCOUNTER — OFFICE VISIT CONVERTED (OUTPATIENT)
Dept: CARDIOLOGY | Facility: CLINIC | Age: 56
End: 2021-06-01
Attending: NURSE PRACTITIONER

## 2021-06-01 DIAGNOSIS — R06.02 SOB (SHORTNESS OF BREATH): Primary | ICD-10-CM

## 2021-06-05 NOTE — PROGRESS NOTES
Progress Note      Patient Name: Hollis Haji   Patient ID: [id number]   Sex: Male   YOB: 1965    Primary Care Provider: SETH AVERY   Referring Provider: Jannet AVERY    Visit Date: June 1, 2021    Provider: BENNIE Dupree   Location[place] [facility]   Location Address: [address] [place] [address], [facility]   [place]  [id number]   Location Phone: [phone]          Chief Complaint     Shortness of breath.       History Of Present Illness  REFERRING PROVIDER: Jannet AVERY   Hollis Haji is a 56 year old /White male who since he was here in November was diagnosed with lung cancer, has had surgery and was supposed to undergo chemotherapy but he feared some of the side effects so he canceled the appointment. He wanted to see first cardiac wise if he would tolerate the chemotherapy. He comes complain of shortness of breath, moderate with mild exertion. He feels like that is worse and related to his lungs. He denies any chest pain or palpitations, swelling, dizziness, syncope, PND, or orthopnea. He has lost 15 pounds since he was last here in the office. He has had both of his COVID vaccines but he continues to smoke a pack a day.   PAST MEDICAL HISTORY: Coronary artery disease with previous stent to the RCA; Hyperlipidemia; Hypertension; Nicotine dependence.   PSYCHOSOCIAL HISTORY: Denies alcohol consumption. Currently smokes one pack per day.   CURRENT MEDICATIONS: Budesonide formoterol inhalers; dexamethasone he is supposed to take before his chemo but he is not taking the chemo so he has not started dexamethasone; duloxetine 30 mg once a day; gabapentin 600 mg t.i.d.; other inhalers; lisinopril 10 mg daily; metoprolol ER 25 mg half b.i.d.; omeprazole 40 mg daily; Ondansetron 8 mg daily; ProAir daily; quetiapine 300 mg daily, rosuvastatin 40 mg daily; aspirin 81 mg daily.      ALLERGIES:  No known drug allergies.       Review of  Systems  · Cardiovascular  o Admits  o : shortness of breath while walking or lying flat  o Denies  o : palpitations (fast, fluttering, or skipping beats), swelling (feet, ankles, hands), chest pain or angina pectoris   · Respiratory  o Denies  o : chronic or frequent cough      Vitals  Date Time BP Position Site L\R Cuff Size HR RR TEMP (F) WT  HT  BMI kg/m2 BSA m2 O2 Sat FR L/min FiO2 HC       06/01/2021 02:25 /84 Sitting    64 - R   181lbs 0oz 6'   24.55 2.04             Physical Examination  · Constitutional  o Appearance  o : Awake, alert, in no acute distress.He smells like smoke. He is accompanied by significant other.  · Eyes  o Conjunctivae  o : Conjunctivae normal.  · Ears, Nose, Mouth and Throat  o Oral Cavity  o :   § Oral Mucosa  § : Normal.  · Neck  o Jugular Veins  o : No JVD. Good carotid upstroke. No bruits noted.  · Respiratory  o Respiratory  o : Increased AP diameter. Diminished breath sounds, prolonged expiration. Negative rales or rhonchi.   · Cardiovascular  o Heart  o : PMI is displaced. Heart sounds are distant. S1 and S2 are regular. No S3 and no S4. Negative systolic/diastolic murmur.  o Peripheral Vascular System  o :   § Extremities  § : Good femoral and pedal pulses. No pedal edema.  · Gastrointestinal  o Abdominal Examination  o : Soft. No tenderness or masses felt. No hepatosplenomegaly. Abdominal aorta is not palpable.  · Labs  o Labs  o : Taken recently, his total cholesterol was 96, triglycerides are 143, HDL is 45, LDL is up at 98.           Assessment     1.  Shortness of breath.   2.  Hyperlipidemia, needs tighter control.   3.  Coronary artery disease, previous stent without angina.   4.  Hypertension as controlled.  5.  Nicotine dependence.  6.  Recent surgery for lung carcinoma.          Plan     1.  We will do an echocardiogram to evaluate his shortness of breath prior to starting chemotherapy.   2.  The patient is aware of the risks of continued smoking but he has no  desire to stop at this point.  3.  Continue his lisinopril and metoprolol in view of his hypertension.   4.  We will add Zetia 10 mg to his rosuvastatin for tighter control of his lipids.   5.  We will check a lipid and CMP in 3 months.  6.  The rest of the treatment is based on the results of the echocardiogram.     FOLLOW-UP: 6 months or earlier if needed.         BENNIE Grijalva  JF:vh                Electronically Signed by: Cristina Quintero-, OT -Author on June 2, 2021 01:20:49 PM  Electronically Co-signed by: BENNIE Dupree -Reviewer on June 2, 2021 02:17:55 PM

## 2021-06-07 PROBLEM — C34.31 SQUAMOUS CELL CARCINOMA OF BRONCHUS IN RIGHT LOWER LOBE: Status: ACTIVE | Noted: 2021-06-07

## 2021-06-15 ENCOUNTER — OFFICE VISIT (OUTPATIENT)
Dept: PULMONOLOGY | Facility: CLINIC | Age: 56
End: 2021-06-15

## 2021-06-15 VITALS
BODY MASS INDEX: 36.12 KG/M2 | TEMPERATURE: 97.9 F | RESPIRATION RATE: 16 BRPM | DIASTOLIC BLOOD PRESSURE: 80 MMHG | SYSTOLIC BLOOD PRESSURE: 118 MMHG | OXYGEN SATURATION: 100 % | WEIGHT: 184 LBS | HEART RATE: 82 BPM | HEIGHT: 60 IN

## 2021-06-15 DIAGNOSIS — K76.9 LIVER LESION: ICD-10-CM

## 2021-06-15 DIAGNOSIS — C34.90 SQUAMOUS CELL CARCINOMA OF LUNG, UNSPECIFIED LATERALITY (HCC): Primary | ICD-10-CM

## 2021-06-15 DIAGNOSIS — Z72.0 TOBACCO ABUSE: ICD-10-CM

## 2021-06-15 DIAGNOSIS — J44.9 CHRONIC OBSTRUCTIVE PULMONARY DISEASE, UNSPECIFIED COPD TYPE (HCC): ICD-10-CM

## 2021-06-15 DIAGNOSIS — R91.1 LUNG NODULE: ICD-10-CM

## 2021-06-15 PROCEDURE — 99214 OFFICE O/P EST MOD 30 MIN: CPT | Performed by: NURSE PRACTITIONER

## 2021-06-15 PROCEDURE — 99406 BEHAV CHNG SMOKING 3-10 MIN: CPT | Performed by: NURSE PRACTITIONER

## 2021-06-15 RX ORDER — PSEUDOEPHED/ACETAMINOPH/DIPHEN 30MG-500MG
TABLET ORAL
COMMUNITY
Start: 2021-03-30 | End: 2021-06-15

## 2021-06-15 RX ORDER — OXYCODONE HYDROCHLORIDE 5 MG/1
5 TABLET ORAL EVERY 6 HOURS PRN
COMMUNITY
Start: 2021-04-27 | End: 2021-06-15

## 2021-06-15 RX ORDER — NITROGLYCERIN 2.5 MG/1
CAPSULE ORAL
COMMUNITY
End: 2022-10-06

## 2021-06-15 RX ORDER — EVOLOCUMAB 140 MG/ML
INJECTION, SOLUTION SUBCUTANEOUS
COMMUNITY
Start: 2021-04-16 | End: 2021-11-29

## 2021-06-15 RX ORDER — HYDROXYZINE 50 MG/1
50 TABLET, FILM COATED ORAL 3 TIMES DAILY PRN
COMMUNITY
Start: 2021-05-17 | End: 2021-06-15

## 2021-06-15 RX ORDER — EZETIMIBE 10 MG/1
10 TABLET ORAL DAILY
COMMUNITY
Start: 2021-06-01 | End: 2022-03-04

## 2021-06-15 RX ORDER — ALBUTEROL SULFATE 90 UG/1
AEROSOL, METERED RESPIRATORY (INHALATION)
COMMUNITY
End: 2021-11-29 | Stop reason: SDUPTHER

## 2021-06-15 RX ORDER — PRAZOSIN HYDROCHLORIDE 2 MG/1
CAPSULE ORAL
COMMUNITY
Start: 2021-05-04 | End: 2021-06-15

## 2021-06-15 RX ORDER — DULOXETIN HYDROCHLORIDE 30 MG/1
30 CAPSULE, DELAYED RELEASE ORAL
COMMUNITY
Start: 2021-05-20 | End: 2022-10-11 | Stop reason: SDUPTHER

## 2021-06-15 RX ORDER — DOCUSATE SODIUM 100 MG/1
TABLET, FILM COATED ORAL
COMMUNITY
Start: 2021-03-30 | End: 2022-10-06

## 2021-06-15 RX ORDER — PANTOPRAZOLE SODIUM 40 MG/1
TABLET, DELAYED RELEASE ORAL
COMMUNITY
End: 2021-06-15

## 2021-06-15 RX ORDER — LISINOPRIL 10 MG/1
10 TABLET ORAL DAILY
COMMUNITY
Start: 2021-05-17 | End: 2022-03-01

## 2021-06-15 RX ORDER — PROCHLORPERAZINE MALEATE 10 MG
TABLET ORAL
COMMUNITY
Start: 2021-05-20 | End: 2021-06-15

## 2021-06-15 RX ORDER — ONDANSETRON HYDROCHLORIDE 8 MG/1
8 TABLET, FILM COATED ORAL EVERY 8 HOURS PRN
COMMUNITY
Start: 2021-05-20 | End: 2022-07-20

## 2021-06-15 RX ORDER — GABAPENTIN 600 MG/1
TABLET ORAL
COMMUNITY
Start: 2021-06-14 | End: 2021-11-29 | Stop reason: SDUPTHER

## 2021-06-15 RX ORDER — QUETIAPINE FUMARATE 300 MG/1
300 TABLET, FILM COATED ORAL NIGHTLY
COMMUNITY
Start: 2021-05-17

## 2021-06-15 RX ORDER — HYDROCODONE BITARTRATE AND ACETAMINOPHEN 5; 325 MG/1; MG/1
TABLET ORAL
COMMUNITY
End: 2021-06-15

## 2021-06-15 RX ORDER — IPRATROPIUM BROMIDE AND ALBUTEROL SULFATE 2.5; .5 MG/3ML; MG/3ML
SOLUTION RESPIRATORY (INHALATION)
COMMUNITY
Start: 2021-05-04 | End: 2022-10-06

## 2021-06-15 RX ORDER — HYDROCODONE BITARTRATE AND ACETAMINOPHEN 7.5; 325 MG/1; MG/1
TABLET ORAL
COMMUNITY
End: 2021-06-15

## 2021-06-15 RX ORDER — ASPIRIN 81 MG/1
TABLET ORAL
COMMUNITY

## 2021-06-15 RX ORDER — NALOXONE HYDROCHLORIDE 4 MG/.1ML
SPRAY NASAL
COMMUNITY
Start: 2021-03-30 | End: 2021-06-15

## 2021-06-15 RX ORDER — DEXAMETHASONE 4 MG/1
TABLET ORAL
COMMUNITY
Start: 2021-05-20 | End: 2022-02-21

## 2021-06-15 RX ORDER — OMEPRAZOLE 40 MG/1
40 CAPSULE, DELAYED RELEASE ORAL DAILY
COMMUNITY
Start: 2021-05-17 | End: 2022-03-01

## 2021-06-15 RX ORDER — METOPROLOL SUCCINATE 25 MG/1
12.5 TABLET, EXTENDED RELEASE ORAL 2 TIMES DAILY
COMMUNITY
Start: 2021-05-17 | End: 2022-03-01

## 2021-06-15 RX ORDER — BUDESONIDE AND FORMOTEROL FUMARATE DIHYDRATE 160; 4.5 UG/1; UG/1
2 AEROSOL RESPIRATORY (INHALATION) 2 TIMES DAILY
COMMUNITY
Start: 2021-05-04 | End: 2021-06-15

## 2021-06-15 RX ORDER — BUPROPION HYDROCHLORIDE 150 MG/1
TABLET ORAL
COMMUNITY
End: 2021-06-15

## 2021-06-15 RX ORDER — KETOROLAC TROMETHAMINE 10 MG/1
TABLET, FILM COATED ORAL
COMMUNITY
End: 2021-06-15

## 2021-06-15 RX ORDER — GABAPENTIN 300 MG/1
300 CAPSULE ORAL 3 TIMES DAILY
COMMUNITY
Start: 2021-04-06 | End: 2021-06-15

## 2021-06-15 RX ORDER — ROSUVASTATIN CALCIUM 40 MG/1
40 TABLET, COATED ORAL DAILY
COMMUNITY
Start: 2021-05-04 | End: 2022-03-04

## 2021-06-15 NOTE — PATIENT INSTRUCTIONS
Chronic Obstructive Pulmonary Disease Exacerbation    Chronic obstructive pulmonary disease (COPD) is a long-term (chronic) condition that affects the lungs. COPD is a general term that can be used to describe many different lung problems that cause lung swelling (inflammation) and limit airflow, including chronic bronchitis and emphysema. COPD exacerbations are episodes when breathing symptoms become much worse and require extra treatment.  COPD exacerbations are usually caused by infections. Without treatment, COPD exacerbations can be severe and even life threatening. Frequent COPD exacerbations can cause further damage to the lungs.  What are the causes?  This condition may be caused by:  · Respiratory infections, including viral and bacterial infections.  · Exposure to smoke.  · Exposure to air pollution, chemical fumes, or dust.  · Things that give you an allergic reaction (allergens).  · Not taking your usual COPD medicines as directed.  · Underlying medical problems, such as congestive heart failure or infections not involving the lungs.  In many cases, the cause (trigger) of this condition is not known.  What increases the risk?  The following factors may make you more likely to develop this condition:  · Smoking cigarettes.  · Old age.  · Frequent prior COPD exacerbations.  What are the signs or symptoms?  Symptoms of this condition include:  · Increased coughing.  · Increased production of mucus from your lungs (sputum).  · Increased wheezing.  · Increased shortness of breath.  · Rapid or labored breathing.  · Chest tightness.  · Less energy than usual.  · Sleep disruption from symptoms.  · Confusion or increased sleepiness.  Often these symptoms happen or get worse even with the use of medicines.  How is this diagnosed?  This condition is diagnosed based on:  · Your medical history.  · A physical exam.  You may also have tests, including:  · A chest X-ray.  · Blood tests.  · Lung (pulmonary) function  tests.  How is this treated?  Treatment for this condition depends on the severity and cause of the symptoms. You may need to be admitted to a hospital for treatment. Some of the treatments commonly used to treat COPD exacerbations are:  · Antibiotic medicines. These may be used for severe exacerbations caused by a lung infection, such as pneumonia.  · Bronchodilators. These are inhaled medicines that expand the air passages and allow increased airflow.  · Steroid medicines. These act to reduce inflammation in the airways. They may be given with an inhaler, taken by mouth, or given through an IV tube inserted into one of your veins.  · Supplemental oxygen therapy.  · Airway clearing techniques, such as noninvasive ventilation (NIV) and positive expiratory pressure (PEP). These provide respiratory support through a mask or other noninvasive device. An example of this would be using a continuous positive airway pressure (CPAP) machine to improve delivery of oxygen into your lungs.  Follow these instructions at home:  Medicines  · Take over-the-counter and prescription medicines only as told by your health care provider. It is important to use correct technique with inhaled medicines.  · If you were prescribed an antibiotic medicine or oral steroid, take it as told by your health care provider. Do not stop taking the medicine even if you start to feel better.  Lifestyle  · Eat a healthy diet.  · Exercise regularly.  · Get plenty of sleep.  · Avoid exposure to all substances that irritate the airway, especially to tobacco smoke.  · Wash your hands often with soap and water to reduce the risk of infection. If soap and water are not available, use hand .  · During flu season, avoid enclosed spaces that are crowded with people.  General instructions  · Drink enough fluid to keep your urine clear or pale yellow (unless you have a medical condition that requires fluid restriction).  · Use a cool mist vaporizer. This  humidifies the air and makes it easier for you to clear your chest when you cough.  · If you have a home nebulizer and oxygen, continue to use them as told by your health care provider.  · Keep all follow-up visits as told by your health care provider. This is important.  How is this prevented?  · Stay up-to-date on pneumococcal and influenza (flu) vaccines. A flu shot is recommended every year to help prevent exacerbations.  · Do not use any products that contain nicotine or tobacco, such as cigarettes and e-cigarettes. Quitting smoking is very important in preventing COPD from getting worse and in preventing exacerbations from happening as often. If you need help quitting, ask your health care provider.  · Follow all instructions for pulmonary rehabilitation after a recent exacerbation. This can help prevent future exacerbations.  · Work with your health care provider to develop and follow an action plan. This tells you what steps to take when you experience certain symptoms.  Contact a health care provider if:  · You have a worsening of your regular COPD symptoms.  Get help right away if:  · You have worsening shortness of breath, even when resting.  · You have trouble talking.  · You have severe chest pain.  · You cough up blood.  · You have a fever.  · You have weakness, vomit repeatedly, or faint.  · You feel confused.  · You are not able to sleep because of your symptoms.  · You have trouble doing daily activities.  Summary  · COPD exacerbations are episodes when breathing symptoms become much worse and require extra treatment above your normal treatment.  · Exacerbations can be severe and even life threatening. Frequent COPD exacerbations can cause further damage to your lungs.  · COPD exacerbations are usually triggered by infections such as the flu, colds, and even pneumonia.  · Treatment for this condition depends on the severity and cause of the symptoms. You may need to be admitted to a hospital for  treatment.  · Quitting smoking is very important to prevent COPD from getting worse and to prevent exacerbations from happening as often.  This information is not intended to replace advice given to you by your health care provider. Make sure you discuss any questions you have with your health care provider.  Document Revised: 11/30/2018 Document Reviewed: 01/22/2018  [employer] Patient Education © 2021 [employer]      Managing the Challenge of Quitting Smoking  Quitting smoking is a physical and mental challenge. You will face cravings, withdrawal symptoms, and temptation. Before quitting, work with your health care provider to make a plan that can help you manage quitting. Preparation can help you quit and keep you from giving in.  How to manage lifestyle changes  Managing stress  Stress can make you want to smoke, and wanting to smoke may cause stress. It is important to find ways to manage your stress. You might try some of the following:  · Practice relaxation techniques.  ? Breathe slowly and deeply, in through your nose and out through your mouth.  ? Listen to music.  ? Soak in a bath or take a shower.  ? Imagine a peaceful place or vacation.  · Get some support.  ? Talk with family or friends about your stress.  ? Join a support group.  ? Talk with a counselor or therapist.  · Get some physical activity.  ? Go for a walk, run, or bike ride.  ? Play a favorite sport.  ? Practice yoga.    Medicines  Talk with your health care provider about medicines that might help you deal with cravings and make quitting easier for you.  Relationships  Social situations can be difficult when you are quitting smoking. To manage this, you can:  · Avoid parties and other social situations where people might be smoking.  · Avoid alcohol.  · Leave right away if you have the urge to smoke.  · Explain to your family and friends that you are quitting smoking. Ask for support and let them know you might be a bit grumpy.  · Plan  activities where smoking is not an option.  General instructions  Be aware that many people gain weight after they quit smoking. However, not everyone does. To keep from gaining weight, have a plan in place before you quit and stick to the plan after you quit. Your plan should include:  · Having healthy snacks. When you have a craving, it may help to:  ? Eat popcorn, carrots, celery, or other cut vegetables.  ? Chew sugar-free gum.  · Changing how you eat.  ? Eat small portion sizes at meals.  ? Eat 4-6 small meals throughout the day instead of 1-2 large meals a day.  ? Be mindful when you eat. Do not watch television or do other things that might distract you as you eat.  · Exercising regularly.  ? Make time to exercise each day. If you do not have time for a long workout, do short bouts of exercise for 5-10 minutes several times a day.  ? Do some form of strengthening exercise, such as weight lifting.  ? Do some exercise that gets your heart beating and causes you to breathe deeply, such as walking fast, running, swimming, or biking. This is very important.  · Drinking plenty of water or other low-calorie or no-calorie drinks. Drink 6-8 glasses of water daily.    How to recognize withdrawal symptoms  Your body and mind may experience discomfort as you try to get used to not having nicotine in your system. These effects are called withdrawal symptoms. They may include:  · Feeling hungrier than normal.  · Having trouble concentrating.  · Feeling irritable or restless.  · Having trouble sleeping.  · Feeling depressed.  · Craving a cigarette.  To manage withdrawal symptoms:  · Avoid places, people, and activities that trigger your cravings.  · Remember why you want to quit.  · Get plenty of sleep.  · Avoid coffee and other caffeinated drinks. These may worsen some of your symptoms.  These symptoms may surprise you. But be assured that they are normal to have when quitting smoking.  How to manage cravings  Come up with  a plan for how to deal with your cravings. The plan should include the following:  · A definition of the specific situation you want to deal with.  · An alternative action you will take.  · A clear idea for how this action will help.  · The name of someone who might help you with this.  Cravings usually last for 5-10 minutes. Consider taking the following actions to help you with your plan to deal with cravings:  · Keep your mouth busy.  ? Chew sugar-free gum.  ? Suck on hard candies or a straw.  ? Brush your teeth.  · Keep your hands and body busy.  ? Change to a different activity right away.  ? Squeeze or play with a ball.  ? Do an activity or a hobby, such as making bead jewelry, practicing needlepoint, or working with wood.  ? Mix up your normal routine.  ? Take a short exercise break. Go for a quick walk or run up and down stairs.  · Focus on doing something kind or helpful for someone else.  · Call a friend or family member to talk during a craving.  · Join a support group.  · Contact a quitline.  Where to find support  To get help or find a support group:  · Call the National Cancer [place]'s Smoking Quitline: 7-328-QUIT NOW (339-7673)  · Visit the website of the Substance Abuse and Mental Health Services Administration: www.samhsa.gov  · Text QUIT to SmokefreeTXT: 993980  Where to find more information  Visit these websites to find more information on quitting smoking:  · National Cancer [place]: www.smokefree.gov  · American Lung Association: www.lung.org  · American Cancer Society: www.cancer.org  · Centers for Disease Control and Prevention: www.cdc.gov  · American Heart Association: www.heart.org  Contact a health care provider if:  · You want to change your plan for quitting.  · The medicines you are taking are not helping.  · Your eating feels out of control or you cannot sleep.  Get help right away if:  · You feel depressed or become very anxious.  Summary  · Quitting smoking is a physical and  mental challenge. You will face cravings, withdrawal symptoms, and temptation to smoke again. Preparation can help you as you go through these challenges.  · Try different techniques to manage stress, handle social situations, and prevent weight gain.  · You can deal with cravings by keeping your mouth busy (such as by chewing gum), keeping your hands and body busy, calling family or friends, or contacting a quitline for people who want to quit smoking.  · You can deal with withdrawal symptoms by avoiding places where people smoke, getting plenty of rest, and avoiding drinks with caffeine.  This information is not intended to replace advice given to you by your health care provider. Make sure you discuss any questions you have with your health care provider.  Document Revised: 10/06/2020 Document Reviewed: 10/06/2020  [employer] Patient Education © 2021 [employer]

## 2021-06-15 NOTE — PROGRESS NOTES
Primary Care Provider  Eugenio Cedeño DO     Referring Provider  No ref. provider found     Chief Complaint  Follow-up, COPD, and Emphysema             History of Presenting Illness  Patient is a 36-year-old male, patient of Dr. Simpson's with a history of COPD and squamous cell carcinoma and tobacco abuse of cigarettes who presents for follow-up visit today.  Patient's wife is present with patient in the office today as well.  Patient states that he is still smoking.  Patient states that he will smoke 1 pack of cigarettes and if he is experiencing pain he will smoke 2 to 3 packs of cigarettes a day.  Patient states that he will try to quit on his own.  Patient states he tried lozenges patches and gum and it did not help and he wants to work on quitting smoking on his own.  Patient states he is under the care of Dr. Simpson and is scheduled to have a follow-up chest CT scan.  Patient states that he did have his right middle lobe and right lower lobe removed and they are wanting him to start chemotherapy however patient wants hold off on chemotherapy for right now due to concern about side effects to medications.  Patient states he is scheduled to follow-up with Dr. Simpson to discuss this further.  Patient states that he is taking Symbicort every day described use DuoNebs and albuterol inhaler as needed.  Patient states his insurance is no longer willing to pay for Symbicort and is needing an another inhaler since the pharmacy.  Patient states that he tried Spiriva in the past and it made him feel sick and no longer take Spiriva.  Patient states that he is needing new tubing and a hose for his nebulizer machine.  Patient denies fever, chills, night sweats, swollen glands in the head and neck, hemoptysis, purulent sputum production, dysphagia, chest pain, chest tightness, abdominal pain, nausea, vomiting, and diarrhea.  Patient also denies any myalgias, changes in sense of taste and/or smell, sore throat, any  other coronavirus flulike symptoms.  Patient denies any leg swelling, orthopnea, paroxysmal nocturnal dyspnea.  Patient states is a form's activities of daily living.Patient states has not had take any antibiotic steroid since last visit denies any hospitalizations since last visit.    Review of Systems   Constitutional: Negative for activity change, appetite change, chills, diaphoresis, fatigue, fever, unexpected weight gain and unexpected weight loss.        Negative for Insomnia   HENT: Negative for congestion (Nasal), hearing loss, mouth sores, nosebleeds, postnasal drip, sore throat, swollen glands and trouble swallowing.         Negative for Thrush  Negative for Hoarseness  Negative for Allergies/Hay Fever  Negative for Recent Head injury  Negative for Ear Fullness  Negative for Nasal or Sinus pain  Negative for Dry lips  Negative for Nasal discharge   Eyes: Negative for blurred vision and visual disturbance.   Respiratory: Negative for apnea, cough, chest tightness, shortness of breath and wheezing.         Negative for Hemoptysis  Negative for Pleuritic pain   Cardiovascular: Negative for chest pain, palpitations and leg swelling.        Negative for Claudication  Negative for Cyanosis  Negative for Dyspnea on exertion   Gastrointestinal: Negative for abdominal pain, diarrhea, nausea, vomiting and GERD.   Musculoskeletal: Negative for joint swelling and myalgias.        Negative for Joint pain  Negative for Joint stiffness   Skin: Negative for color change, dry skin, pallor and rash.   Neurological: Negative for syncope, weakness and headache.   Hematological: Negative for adenopathy. Does not bruise/bleed easily.        Family History   Adopted: Yes   Problem Relation Age of Onset   • No Known Problems Mother    • No Known Problems Father    • No Known Problems Sister    • No Known Problems Brother    • No Known Problems Maternal Grandmother    • No Known Problems Maternal Grandfather    • No Known Problems  Paternal Grandmother    • No Known Problems Paternal Grandfather    • No Known Problems Other         Social History     Socioeconomic History   • Marital status:      Spouse name: Not on file   • Number of children: Not on file   • Years of education: Not on file   • Highest education level: Not on file   Tobacco Use   • Smoking status: Current Every Day Smoker     Packs/day: 1.50     Years: 40.00     Pack years: 60.00   • Smokeless tobacco: Former User     Types: Chew   • Tobacco comment: 1PPD CURRENTLY   Vaping Use   • Vaping Use: Never used   Substance and Sexual Activity   • Alcohol use: Not Currently   • Drug use: Yes     Comment: PAIN KILLERS        Past Medical History:   Diagnosis Date   • Acid reflux disease    • Anxiety    • Arthritis    • Bipolar disorder (CMS/[facility])    • Chronic bronchitis (CMS/[facility])    • COPD (chronic obstructive pulmonary disease) (CMS/[facility])    • Depression    • Heart attack (CMS/[facility])     2 STNTS   • High blood pressure    • Hyperlipidemia    • Lung cancer (CMS/[facility])    • Mental disorder    • PTSD (post-traumatic stress disorder)    • SOB (shortness of breath)    • 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 (CMS/[facility]) 6/7/2021        Immunization History   Administered Date(s) Administered   • COVID-19 (PFIZER) 04/13/2021, 05/04/2021   • Flu Vaccine Quad PF >18YRS 10/30/2018, 09/30/2019   • Flu Vaccine Quad PF >36MO 10/29/2019, 11/17/2020   • Flu Vaccine Split Quad 10/29/2019, 11/17/2020   • Influenza, Unspecified 12/01/2020   • Pneumococcal Polysaccharide (PPSV23) 01/08/2018       No Known Allergies       Current Outpatient Medications:   •  albuterol sulfate HFA (ProAir HFA) 108 (90 Base) MCG/ACT inhaler, ProAir HFA 90 mcg/actuation inhalation HFA aerosol inhaler inhale 1 puff (90 mcg) by inhalation route every 6 hours as needed   Active, Disp: , Rfl:   •  aspirin (aspirin) 81 MG EC tablet, Aspir-81 81 mg oral tablet,delayed release (DR/EC) take 1 tablet (81 mg) by  oral route once daily   Active, Disp: , Rfl:   •  dexamethasone (DECADRON) 4 MG tablet, TAKE ONE TABLET BY MOUTH TWICE DAILY THE DAY PRIOR TO CHEMO, THE DAY OF CHEMO, AND THE DAY AFTER CHEMO, Disp: , Rfl:   •   MG capsule, , Disp: , Rfl:   •  DULoxetine (CYMBALTA) 30 MG capsule, Take 30 mg by mouth every night at bedtime., Disp: , Rfl:   •  Evolocumab (Repatha SureClick) solution auto-injector SureClick injection, Repatha SureClick 140 mg/mL subcutaneous pen injector inject 1 milliliter (140 mg) SQ every 2 weeks in the abdomen, thigh, or outer area of upper arm (rotate sites) 4/16/2021  Active, Disp: , Rfl:   •  ezetimibe (ZETIA) 10 MG tablet, Take 10 mg by mouth Daily., Disp: , Rfl:   •  gabapentin (NEURONTIN) 600 MG tablet, , Disp: , Rfl:   •  ipratropium-albuterol (DUO-NEB) 0.5-2.5 mg/3 ml nebulizer, INHALE CONTENTS OF 1 VIAL BY NEBULIZATION EVERY 4 HOURS AS NEEDED FOR SHORTNESS OF BREATHE, Disp: , Rfl:   •  lisinopril (PRINIVIL,ZESTRIL) 10 MG tablet, Take 10 mg by mouth Daily., Disp: , Rfl:   •  metoprolol succinate XL (TOPROL-XL) 25 MG 24 hr tablet, Take 12.5 mg by mouth 2 (Two) Times a Day., Disp: , Rfl:   •  nitroglycerin (NITRO-BID) 2.5 MG CR capsule, , Disp: , Rfl:   •  omeprazole (priLOSEC) 40 MG capsule, Take 40 mg by mouth Daily. before a meal, Disp: , Rfl:   •  ondansetron (ZOFRAN) 8 MG tablet, Take 8 mg by mouth Every 8 (Eight) Hours As Needed. for nausea, Disp: , Rfl:   •  QUEtiapine (SEROquel) 300 MG tablet, Take 300 mg by mouth Every Night., Disp: , Rfl:   •  rosuvastatin (CRESTOR) 40 MG tablet, Take 40 mg by mouth Daily., Disp: , Rfl:   •  mometasone-formoterol (DULERA 200) 200-5 MCG/ACT inhaler, Inhale 2 puffs 2 (Two) Times a Day for 30 days. Rinse mahesh, Disp: 1 each, Rfl: 11     Objective     Physical Exam   Vital Signs Reviewed  WDWN, Alert, NAD.    HEENT:  PERRL, EOMI.  OP, nares clear, no sinus tenderness  Neck:  Supple, no JVD, no thyromegaly  Lymph: no axillary, cervical,  "supraclavicular lymphadenopathy noted bilaterally  Chest: decreased breath sounds throughout. No wheezes, rales, or rhonchi appreciated.  Normal work of breathing noted.  Patient is able speak full sentences without difficulty.  CV: RRR, no MGR, pulses 2+, equal.  Abd:  Soft, NT, ND, + BS, no HSM  EXT:  no clubbing, no cyanosis, no edema, no joint tenderness  Neuro:  A&Ox3, CN grossly intact, no focal deficits.  Skin: No rashes or lesions noted.    Vital Signs:   /80 (BP Location: Right arm, Patient Position: Sitting, Cuff Size: Adult)   Pulse 82   Temp 97.9 °F (36.6 °C) (Oral)   Resp 16   Ht (!) 6 cm (2.36\")   Wt 83.5 kg (184 lb)   SpO2 100% Comment: room air  BMI 67648.87 kg/m²         Result Review :   I have personally reviewed my last office visit note.       Assessment and Plan      Assessment:  1.  3.8 cm right hilar mass, positive for squamous cell carcinoma with VATS exploration with posterior lateral thoracotomy and lower and middle bilobectomy. Patient care Dr. Calvin King.    2.  History of streptococcuspneumoniae and streptococcus agalacticae group B on bronch. Pt treated.  3.  COPD without acute exacerbation.  Patient on LABA ICS therapy.     4. Pneumonia diagnosed at the ER yesterday, patient currently on Levaquin.      5.  Tobacco abuse with cigarettes, ongoing.      6. 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 Villa, did not light up on PET scan.        5. History of lung nodule.        Plan:  1.  Patient to follow-up with Dr. Simpson and Dr. King as scheduled.     2.  Patient reports his insurance is no longer covering Symbicort.  I will stop Symbicort and start patient on Dulera 200 mcg 2 puffs twice daily.  Patient is advised rinse his mouth out after each use.  3.  Patient to continue albuterol inhaler and duo nebs as needed.  4.  Patient reports they are up-to-date with his flu, pneumonia, and Covid vaccines.  Patient is advised " to continue to follow CDC recommendations such as social distancing wearing a mask and washing hands for at least 20 seconds.  5.  Patient to call the office, 911, or go to the ER with new or worsening symptoms.       6.  I spent 3 minutes today counseling the patient on smoking cessation.  I counseled the patient on the risks of continued smoking including the risk of lung cancer, head and neck cancer, renal cancer, heart disease, stroke, and early death.  Patient refuses nicotine therapy or pharmacotherapy at this time.  Patient is advised to decrease the number of cigarettes they are smoking up until the point to where they can quit.   .      7. Follow up with Dr. Simpson in 4 months, sooner if needed.       Follow Up {Instructions Charge Capture  Follow-up Communications :23}  Return in about 4 months (around 10/15/2021), or if symptoms worsen or fail to improve, for Recheck. Patient to follow up with Dr. Simpson .  Patient was given instructions and counseling regarding his condition or for health maintenance advice. Please see specific information pulled into the AVS if appropriate.

## 2021-07-07 ENCOUNTER — TELEPHONE (OUTPATIENT)
Dept: CARDIOLOGY | Facility: CLINIC | Age: 56
End: 2021-07-07

## 2021-07-07 NOTE — TELEPHONE ENCOUNTER
Pt called to give the names of the med/ chemo that he will be getting for stage 2 non-small cell lung cancer and wants to make sure it isn't damaging on his heart.    (FYI: Pt has an echo this Friday)    Chemo/ main treatment:   Docetaxel  Cisplatin    Pre-med (IV?):  Fosapretitant  Palonesetron  Dexamethasond    To take at night:   Duloxetine 30 mg    Nausea:   Prochlorperazine 10 mg  Ondansetron 8 mg    Day before/ of/ after chemo:  Dexamethasone 4 mg    He will be having treatment at the [facility].

## 2021-07-08 NOTE — TELEPHONE ENCOUNTER
We will know how his heart muscle and valves are doing at this time with the echo this week.  With any treatment plan always have to look at the risk versus the benefits.  There are potential side effects with all treatments.  He will need the chemo for his lung cancer.  And we will monitor the echo in the future if he has any complaints.

## 2021-07-15 VITALS
WEIGHT: 181 LBS | SYSTOLIC BLOOD PRESSURE: 128 MMHG | DIASTOLIC BLOOD PRESSURE: 84 MMHG | BODY MASS INDEX: 24.52 KG/M2 | HEART RATE: 64 BPM | HEIGHT: 72 IN

## 2021-07-16 NOTE — TELEPHONE ENCOUNTER
Relayed msg to pt. Pt stated that Dr. King wants him to have 4 chemo treatments even though the surgeon said he removed all the cancer. Pt has echo on 7-23-21.

## 2021-07-26 ENCOUNTER — TELEPHONE (OUTPATIENT)
Dept: ONCOLOGY | Facility: HOSPITAL | Age: 56
End: 2021-07-26

## 2021-07-26 NOTE — TELEPHONE ENCOUNTER
Caller: CESAR    Relationship to patient: MD. CARLOS [facility]ALLES[facility] SURGEON    Best call back number:[phone]    Chief complaint: PT LAST SEEN BY MD CAMACHO. 6/3/21. PT IS CONCERNED HOW CHEMO WILL AFFECT HEART AND WAS TOLD TO MAKE AN APPT TO CHECK IN PER HIS CARDIOLOGIST.    Type of visit: FU    Requested date: NEXT AVAILABLE     If rescheduling, when is the original appointment: NA    Additional notes: PLEASE RETURN CALL TO PT TO SCHEDULE.

## 2021-07-28 ENCOUNTER — TELEPHONE (OUTPATIENT)
Dept: CARDIOLOGY | Facility: CLINIC | Age: 56
End: 2021-07-28

## 2021-07-28 NOTE — TELEPHONE ENCOUNTER
Called pt to give him his echo results and he asked me if the results meant he was cleared for chemo, I asked Dr. Lopez and he said he was cleared for chemo. Dr. Lopez told me to let Rosmery, his nurse navigator, know so that she could fax over the paperwork for the pt. All of this has been done.

## 2021-07-28 NOTE — TELEPHONE ENCOUNTER
----- Message from Rosmery Singletary RN sent at 7/28/2021  2:12 PM EDT -----    ----- Message -----  From: Keo Lopez MD  Sent: 7/28/2021   7:48 AM EDT  To: Rosmery Singletary RN    EF normal, no major valve problems. Follow up as scheduled.

## 2021-07-29 ENCOUNTER — TELEPHONE (OUTPATIENT)
Dept: ONCOLOGY | Facility: HOSPITAL | Age: 56
End: 2021-07-29

## 2021-07-29 NOTE — TELEPHONE ENCOUNTER
Caller: Stephane Haji    Relationship: Self    Best call back number: [phone]    What was the call regarding: STEPHANE WAS RETURNING CALL. HE SAYS HE ANSWERED THE CALL BUT ALL HE HEARD WAS SILENCE. HE IS NOT SURE WHO CALLED OR WHAT IT WAS PERTAINING TO.    Do you require a callback: YES

## 2021-07-30 NOTE — TELEPHONE ENCOUNTER
Caller: Stephane Haji    Relationship: Self    Best call back number: [phone]    What was the call regarding: STEPHANE WAS CALLING FOR AN UPDATED ON GETTING CHEMO SCHEDULED. HE SAYS HE NEVER RECEIVED A CALL BACK.    Do you require a callback: YES

## 2021-08-02 NOTE — TELEPHONE ENCOUNTER
Caller: Stephane Haji    Relationship: Self    Best call back number: [phone]    What was the call regarding: STEPHANE WAS CALLING FOR AN UPDATE REGARDING HIS SCHEDULE.    Do you require a callback: YES

## 2021-08-03 ENCOUNTER — APPOINTMENT (OUTPATIENT)
Dept: CT IMAGING | Facility: HOSPITAL | Age: 56
End: 2021-08-03

## 2021-08-03 ENCOUNTER — TELEPHONE (OUTPATIENT)
Dept: ONCOLOGY | Facility: HOSPITAL | Age: 56
End: 2021-08-03

## 2021-08-03 DIAGNOSIS — C34.31 SQUAMOUS CELL CARCINOMA OF BRONCHUS IN RIGHT LOWER LOBE (HCC): Primary | ICD-10-CM

## 2021-08-04 PROBLEM — C34.90 LUNG CANCER: Status: ACTIVE | Noted: 2021-06-04

## 2021-08-04 PROBLEM — I25.2 OLD MYOCARDIAL INFARCTION: Status: ACTIVE | Noted: 2021-08-04

## 2021-08-04 PROBLEM — J44.9 COPD (CHRONIC OBSTRUCTIVE PULMONARY DISEASE): Status: ACTIVE | Noted: 2021-08-04

## 2021-08-04 PROBLEM — M54.2 CERVICALGIA: Status: ACTIVE | Noted: 2021-08-04

## 2021-08-04 PROBLEM — I10 ESSENTIAL HYPERTENSION: Status: ACTIVE | Noted: 2021-08-04

## 2021-08-04 PROBLEM — M47.22 CERVICAL SPONDYLOSIS WITH RADICULOPATHY: Status: ACTIVE | Noted: 2017-10-21

## 2021-08-04 PROBLEM — M19.90 ARTHRITIS: Status: ACTIVE | Noted: 2021-08-04

## 2021-08-04 PROBLEM — F32.A DEPRESSION: Status: ACTIVE | Noted: 2021-08-04

## 2021-08-04 PROBLEM — F41.9 ANXIETY: Status: ACTIVE | Noted: 2021-08-04

## 2021-08-04 PROBLEM — E78.5 HYPERLIPEMIA: Status: ACTIVE | Noted: 2021-08-04

## 2021-08-04 PROBLEM — Z71.89 COUNSELING ON SUBSTANCE USE AND ABUSE: Status: ACTIVE | Noted: 2019-08-29

## 2021-08-04 PROBLEM — I25.10 CORONARY ARTERY DISEASE: Status: ACTIVE | Noted: 2021-08-04

## 2021-08-04 PROBLEM — F31.61 BIPOLAR 1 DISORDER, MIXED, MILD: Status: ACTIVE | Noted: 2019-08-29

## 2021-08-09 ENCOUNTER — APPOINTMENT (OUTPATIENT)
Dept: ONCOLOGY | Facility: HOSPITAL | Age: 56
End: 2021-08-09

## 2021-08-10 ENCOUNTER — HOSPITAL ENCOUNTER (OUTPATIENT)
Dept: ONCOLOGY | Facility: HOSPITAL | Age: 56
Setting detail: INFUSION SERIES
End: 2021-08-10

## 2021-11-08 ENCOUNTER — TRANSCRIBE ORDERS (OUTPATIENT)
Dept: ADMINISTRATIVE | Facility: HOSPITAL | Age: 56
End: 2021-11-08

## 2021-11-08 DIAGNOSIS — C34.90 MALIGNANT NEOPLASM OF LUNG, UNSPECIFIED LATERALITY, UNSPECIFIED PART OF LUNG (HCC): Primary | ICD-10-CM

## 2021-11-10 ENCOUNTER — HOSPITAL ENCOUNTER (OUTPATIENT)
Dept: CT IMAGING | Facility: HOSPITAL | Age: 56
Discharge: HOME | End: 2021-11-10
Admitting: NURSE PRACTITIONER

## 2021-11-10 DIAGNOSIS — C34.90 MALIGNANT NEOPLASM OF LUNG, UNSPECIFIED LATERALITY, UNSPECIFIED PART OF LUNG (HCC): ICD-10-CM

## 2021-11-10 LAB — CREAT BLDA-MCNC: 0.8 MG/DL

## 2021-11-10 PROCEDURE — 71260 CT THORAX DX C+: CPT

## 2021-11-10 PROCEDURE — 0 IOPAMIDOL PER 1 ML: Performed by: NURSE PRACTITIONER

## 2021-11-10 PROCEDURE — 82565 ASSAY OF CREATININE: CPT

## 2021-11-10 PROCEDURE — 74177 CT ABD & PELVIS W/CONTRAST: CPT

## 2021-11-10 RX ADMIN — IOPAMIDOL 100 ML: 755 INJECTION, SOLUTION INTRAVENOUS at 10:12

## 2021-11-29 ENCOUNTER — OFFICE VISIT (OUTPATIENT)
Dept: FAMILY MEDICINE CLINIC | Facility: CLINIC | Age: 56
End: 2021-11-29

## 2021-11-29 VITALS
DIASTOLIC BLOOD PRESSURE: 67 MMHG | BODY MASS INDEX: 26.61 KG/M2 | HEART RATE: 70 BPM | HEIGHT: 72 IN | OXYGEN SATURATION: 100 % | TEMPERATURE: 98.4 F | SYSTOLIC BLOOD PRESSURE: 131 MMHG | WEIGHT: 196.5 LBS

## 2021-11-29 DIAGNOSIS — E78.5 HYPERLIPIDEMIA, UNSPECIFIED HYPERLIPIDEMIA TYPE: ICD-10-CM

## 2021-11-29 DIAGNOSIS — I25.10 CORONARY ARTERY DISEASE INVOLVING NATIVE CORONARY ARTERY OF NATIVE HEART WITHOUT ANGINA PECTORIS: Primary | ICD-10-CM

## 2021-11-29 DIAGNOSIS — Z28.21 INFLUENZA VACCINE REFUSED: ICD-10-CM

## 2021-11-29 DIAGNOSIS — Z72.0 TOBACCO ABUSE: ICD-10-CM

## 2021-11-29 DIAGNOSIS — I10 ESSENTIAL HYPERTENSION: ICD-10-CM

## 2021-11-29 DIAGNOSIS — C34.31 SQUAMOUS CELL CARCINOMA OF BRONCHUS IN RIGHT LOWER LOBE (HCC): ICD-10-CM

## 2021-11-29 DIAGNOSIS — J44.9 CHRONIC OBSTRUCTIVE PULMONARY DISEASE, UNSPECIFIED COPD TYPE (HCC): ICD-10-CM

## 2021-11-29 PROCEDURE — 80061 LIPID PANEL: CPT | Performed by: FAMILY MEDICINE

## 2021-11-29 PROCEDURE — 36415 COLL VENOUS BLD VENIPUNCTURE: CPT | Performed by: FAMILY MEDICINE

## 2021-11-29 PROCEDURE — 99214 OFFICE O/P EST MOD 30 MIN: CPT | Performed by: FAMILY MEDICINE

## 2021-11-29 RX ORDER — HYDROCODONE BITARTRATE AND ACETAMINOPHEN 10; 325 MG/1; MG/1
TABLET ORAL
COMMUNITY
Start: 2021-11-12 | End: 2021-11-29

## 2021-11-29 RX ORDER — BUDESONIDE AND FORMOTEROL FUMARATE DIHYDRATE 160; 4.5 UG/1; UG/1
AEROSOL RESPIRATORY (INHALATION)
COMMUNITY
Start: 2021-10-23 | End: 2022-02-02

## 2021-11-29 RX ORDER — ALBUTEROL SULFATE 90 UG/1
2 AEROSOL, METERED RESPIRATORY (INHALATION) EVERY 6 HOURS PRN
Qty: 18 G | Refills: 2 | Status: SHIPPED | OUTPATIENT
Start: 2021-11-29 | End: 2021-12-29

## 2021-11-29 RX ORDER — LIDOCAINE 50 MG/G
PATCH TOPICAL
COMMUNITY
Start: 2021-11-14 | End: 2022-02-21

## 2021-11-29 RX ORDER — HYDROCODONE BITARTRATE AND ACETAMINOPHEN 7.5; 325 MG/1; MG/1
TABLET ORAL
COMMUNITY
Start: 2021-10-12 | End: 2021-11-29

## 2021-11-29 RX ORDER — FAMOTIDINE 20 MG/1
TABLET, FILM COATED ORAL
COMMUNITY
Start: 2021-11-21 | End: 2021-11-29

## 2021-11-29 RX ORDER — NALOXONE HYDROCHLORIDE 4 MG/.1ML
4 SPRAY NASAL
COMMUNITY
Start: 2021-09-22 | End: 2022-02-21

## 2021-11-29 RX ORDER — FAMOTIDINE 20 MG/1
20 TABLET, FILM COATED ORAL
COMMUNITY
Start: 2021-09-22 | End: 2022-10-11 | Stop reason: SDUPTHER

## 2021-11-29 RX ORDER — PROMETHAZINE HYDROCHLORIDE 25 MG/1
TABLET ORAL
COMMUNITY
Start: 2021-11-14 | End: 2022-02-16 | Stop reason: ALTCHOICE

## 2021-11-29 RX ORDER — GABAPENTIN 600 MG/1
600 TABLET ORAL 3 TIMES DAILY
Qty: 90 TABLET | Refills: 5 | Status: SHIPPED | OUTPATIENT
Start: 2021-11-29 | End: 2022-02-21

## 2021-11-29 RX ORDER — HYDROCODONE BITARTRATE AND ACETAMINOPHEN 10; 325 MG/1; MG/1
1 TABLET ORAL
COMMUNITY
Start: 2021-11-12 | End: 2021-12-12

## 2021-11-29 RX ORDER — PSEUDOEPHEDRINE HCL 30 MG
100 TABLET ORAL
COMMUNITY
Start: 2021-09-24 | End: 2022-10-06

## 2021-11-29 RX ORDER — PRAZOSIN HYDROCHLORIDE 2 MG/1
CAPSULE ORAL
COMMUNITY
Start: 2021-11-07

## 2021-11-29 RX ORDER — PROCHLORPERAZINE MALEATE 10 MG
TABLET ORAL
COMMUNITY
Start: 2021-10-10 | End: 2022-02-16

## 2021-11-29 RX ORDER — HYDROXYZINE 50 MG/1
1 TABLET, FILM COATED ORAL 3 TIMES DAILY PRN
COMMUNITY
Start: 2021-06-21 | End: 2022-10-11 | Stop reason: SDUPTHER

## 2021-11-29 RX ORDER — OXYCODONE HYDROCHLORIDE 5 MG/1
TABLET ORAL
COMMUNITY
Start: 2021-09-27 | End: 2021-11-29

## 2021-11-29 NOTE — ASSESSMENT & PLAN NOTE
He states he is smoking less than a pack and a half a day right now.  His worst times are with his anxiety.

## 2021-11-29 NOTE — ASSESSMENT & PLAN NOTE
Overall he seems to be doing well.  He is finished his recent course of chemo with no evidence of disease on his most recent CT scan.  He has a follow-up appoint with Dr. Cartagena next week.

## 2021-11-29 NOTE — PROGRESS NOTES
Chief Complaint   Patient presents with   • Follow-up     6 month follow up   • Med Refill        Subjective     Hollis Haji  has a past medical history of Acid reflux disease, Anxiety, Arthritis, COPD (chronic obstructive pulmonary disease) (HCC), Depression, Heart attack (HCC), Lung cancer (HCC), PTSD (post-traumatic stress disorder), SOB (shortness of breath), and 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 (HCC) (6/7/2021).    Hypertension-he checks his blood pressure at home occasionally.  He states when he does it is usually very good.  At all of his oncology appointments his blood pressure is also been very good.    Hyperlipidemia-he is taking his statin on a regular basis.    Coronary artery disease-he denies any recurrent anginal symptoms of chest pain tightness or heaviness.    COPD-he has chronic shortness of breath.  He has had his right mid and lower lung removed due to lung cancer though.    Lung cancer-he has recently just finished his chemotherapy.  He had a follow-up CT scan few weeks back that showed no evidence of persistent disease.  He does have a follow-up with Dr. Mitzi canales at [facility] next week.      PHQ-2 Depression Screening  Little interest or pleasure in doing things? 3   Feeling down, depressed, or hopeless? 1   PHQ-2 Total Score 9   PHQ-9 Depression Screening  Little interest or pleasure in doing things? 3   Feeling down, depressed, or hopeless? 1   Trouble falling or staying asleep, or sleeping too much? 1   Feeling tired or having little energy? 3   Poor appetite or overeating? 0   Feeling bad about yourself - or that you are a failure or have let yourself or your family down? 0   Trouble concentrating on things, such as reading the newspaper or watching television? 1   Moving or speaking so slowly that other people could have noticed? Or the opposite - being so fidgety or restless that you have been moving around a lot more than usual? 0   Thoughts that you would be  better off dead, or of hurting yourself in some way? 0   PHQ-9 Total Score 9   If you checked off any problems, how difficult have these problems made it for you to do your work, take care of things at home, or get along with other people? Somewhat difficult     No Known Allergies    Prior to Admission medications    Medication Sig Start Date End Date Taking? Authorizing Provider   albuterol sulfate HFA (ProAir HFA) 108 (90 Base) MCG/ACT inhaler ProAir HFA 90 mcg/actuation inhalation HFA aerosol inhaler inhale 1 puff (90 mcg) by inhalation route every 6 hours as needed   Active   Yes Asher Chambers MD   aspirin (aspirin) 81 MG EC tablet Aspir-81 81 mg oral tablet,delayed release (DR/EC) take 1 tablet (81 mg) by oral route once daily   Active   Yes Asher Chambers MD   docusate sodium 100 MG capsule Take 100 mg by mouth. 9/24/21  Yes Asher Chambers MD    MG capsule  3/30/21  Yes Asher Chambers MD   DULoxetine (CYMBALTA) 30 MG capsule Take 30 mg by mouth every night at bedtime. 5/20/21  Yes Asher Chambers MD   ezetimibe (ZETIA) 10 MG tablet Take 10 mg by mouth Daily. 6/1/21  Yes Asher Chambers MD   gabapentin (NEURONTIN) 600 MG tablet  6/14/21  Yes Asher Chambers MD   HYDROcodone-acetaminophen (NORCO)  MG per tablet TAKE ONE TABLET BY MOUTH EVERY 8 HOURS AS NEEDED FOR PAIN. Max daily AMOUNT 3 tablets 11/12/21  Yes ProviderAsher MD   ipratropium-albuterol (DUO-NEB) 0.5-2.5 mg/3 ml nebulizer INHALE CONTENTS OF 1 VIAL BY NEBULIZATION EVERY 4 HOURS AS NEEDED FOR SHORTNESS OF BREATHE 5/4/21  Yes Asher Chambers MD   lidocaine (LIDODERM) 5 %  11/14/21  Yes Asher Chambers MD   lisinopril (PRINIVIL,ZESTRIL) 10 MG tablet Take 10 mg by mouth Daily. 5/17/21  Yes Asher Chambers MD   metoprolol succinate XL (TOPROL-XL) 25 MG 24 hr tablet Take 12.5 mg by mouth 2 (Two) Times a Day. 5/17/21  Yes ProviderAsher MD   nitroglycerin  (NITRO-BID) 2.5 MG CR capsule    Yes Asher Chambers MD   omeprazole (priLOSEC) 40 MG capsule Take 40 mg by mouth Daily. before a meal 5/17/21  Yes Asher Chambers MD   QUEtiapine (SEROquel) 300 MG tablet Take 300 mg by mouth Every Night. 5/17/21  Yes Asher Chambers MD   rosuvastatin (CRESTOR) 40 MG tablet Take 40 mg by mouth Daily. 5/4/21  Yes Asher Chambers MD   Symbicort 160-4.5 MCG/ACT inhaler  10/23/21  Yes Asher Chambers MD   dexamethasone (DECADRON) 4 MG tablet TAKE ONE TABLET BY MOUTH TWICE DAILY THE DAY PRIOR TO CHEMO, THE DAY OF CHEMO, AND THE DAY AFTER CHEMO 5/20/21   Asher Chambers MD   famotidine (PEPCID) 20 MG tablet Take 20 mg by mouth. 9/22/21 9/22/22  Asher Chambers MD   famotidine (PEPCID) 20 MG tablet  11/21/21   Asher Chambers MD   HYDROcodone-acetaminophen (NORCO)  MG per tablet Take 1 tablet by mouth. 11/12/21 12/12/21  Asher Chambers MD   HYDROcodone-acetaminophen (NORCO) 7.5-325 MG per tablet  10/12/21   Asher Chambers MD   hydrOXYzine (ATARAX) 50 MG tablet Take 1 tablet by mouth 3 (Three) Times a Day As Needed. 6/21/21   Asher Chambers MD   mometasone-formoterol (DULERA 200) 200-5 MCG/ACT inhaler Inhale 2 puffs 2 (Two) Times a Day for 30 days. Rinse mahesh 6/15/21 7/15/21  Jannet Headley APRN   naloxone (NARCAN) 4 MG/0.1ML nasal spray 4 mg into the nostril(s) as directed by provider. 9/22/21   Asher Chambers MD   ondansetron (ZOFRAN) 8 MG tablet Take 8 mg by mouth Every 8 (Eight) Hours As Needed. for nausea 5/20/21   Asher Chambers MD   oxyCODONE (ROXICODONE) 5 MG immediate release tablet  9/27/21   Asher Chambers MD   prazosin (MINIPRESS) 2 MG capsule  11/7/21   Asher Chambers MD   prochlorperazine (COMPAZINE) 10 MG tablet  10/10/21   Provider, MD Asher   promethazine (PHENERGAN) 25 MG tablet  11/14/21   Provider, MD Asher   Evolocumab (Repatha SureClick) solution  auto-injector SureClick injection Repatha SureClick 140 mg/mL subcutaneous pen injector inject 1 milliliter (140 mg) SQ every 2 weeks in the abdomen, thigh, or outer area of upper arm (rotate sites) 4/16/2021  Active 4/16/21 11/29/21  Provider, MD Asher        Patient Active Problem List   Diagnosis   • 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 (HCC)   • Squamous cell carcinoma of lung (HCC)   • Lung nodule   • Tobacco abuse   • Liver lesion   • Anxiety   • Arthritis   • Bipolar 1 disorder, mixed, mild (HCC)   • Cervical spinal stenosis   • Cervical spondylosis with radiculopathy   • Cervicalgia   • Coronary artery disease   • Counseling on substance use and abuse   • Depression   • Essential hypertension   • Hyperlipemia   • Lung cancer (HCC)   • Old myocardial infarction   • Other and unspecified disc disorder of cervical region   • COPD (chronic obstructive pulmonary disease) (HCC)   • Influenza vaccine refused        Past Surgical History:   Procedure Laterality Date   • LUNG BIOPSY     • LUNG REMOVAL, PARTIAL  03/26/2021    [place]       Social History     Socioeconomic History   • Marital status:    Tobacco Use   • Smoking status: Current Every Day Smoker     Packs/day: 1.50     Years: 40.00     Pack years: 60.00   • Smokeless tobacco: Former User     Types: Chew   • Tobacco comment: 1PPD CURRENTLY   Vaping Use   • Vaping Use: Never used   Substance and Sexual Activity   • Alcohol use: Not Currently   • Drug use: Yes     Comment: PAIN KILLERS       Family History   Adopted: Yes   Problem Relation Age of Onset   • No Known Problems Mother    • No Known Problems Father    • No Known Problems Sister    • No Known Problems Brother    • No Known Problems Maternal Grandmother    • No Known Problems Maternal Grandfather    • No Known Problems Paternal Grandmother    • No Known Problems Paternal Grandfather    • No Known Problems Other        Family history, surgical history, past medical  "history, Allergies and med's reviewed with patient today and updated in [facility] EMR.     ROS:  Review of Systems   Constitutional: Positive for fatigue. Negative for appetite change and unexpected weight loss.   HENT: Negative for congestion, postnasal drip and rhinorrhea.    Eyes: Negative for blurred vision and visual disturbance.   Respiratory: Positive for shortness of breath. Negative for cough, chest tightness and wheezing.    Cardiovascular: Negative for chest pain and palpitations.   Gastrointestinal: Negative for abdominal pain, constipation and diarrhea.   Neurological: Negative for headache.   Hematological: Negative for adenopathy.   Psychiatric/Behavioral: Negative for depressed mood. The patient is not nervous/anxious.        OBJECTIVE:  Vitals:    11/29/21 1132   BP: 131/67   BP Location: Left arm   Patient Position: Sitting   Cuff Size: Adult   Pulse: 70   Temp: 98.4 °F (36.9 °C)   TempSrc: Temporal   SpO2: 100%   Weight: 89.1 kg (196 lb 8 oz)   Height: 182.9 cm (72.01\")     No exam data present   Body mass index is 26.64 kg/m².  No LMP for male patient.    Physical Exam  Vitals and nursing note reviewed.   Constitutional:       General: He is not in acute distress.     Appearance: Normal appearance. He is normal weight.   HENT:      Head: Normocephalic.      Right Ear: Tympanic membrane, ear canal and external ear normal.      Left Ear: Tympanic membrane, ear canal and external ear normal.      Nose: Nose normal.      Mouth/Throat:      Mouth: Mucous membranes are moist.      Pharynx: Oropharynx is clear.   Eyes:      General: No scleral icterus.     Conjunctiva/sclera: Conjunctivae normal.      Pupils: Pupils are equal, round, and reactive to light.   Cardiovascular:      Rate and Rhythm: Normal rate and regular rhythm.      Pulses: Normal pulses.      Heart sounds: Normal heart sounds. No murmur heard.      Pulmonary:      Effort: Pulmonary effort is normal.      Breath sounds: Examination of the " right-middle field reveals decreased breath sounds. Examination of the right-lower field reveals decreased breath sounds. Decreased breath sounds present. No wheezing, rhonchi or rales.   Musculoskeletal:      Cervical back: No rigidity or tenderness.   Lymphadenopathy:      Cervical: No cervical adenopathy.   Skin:     General: Skin is warm and dry.      Coloration: Skin is not jaundiced.      Findings: No rash.   Neurological:      General: No focal deficit present.      Mental Status: He is alert and oriented to person, place, and time.      Gait: Gait normal.   Psychiatric:         Mood and Affect: Mood normal.         Thought Content: Thought content normal.         Judgment: Judgment normal.         Procedures    Hospital Outpatient Visit on 11/10/2021   Component Date Value Ref Range Status   • Creatinine 11/10/2021 0.80  mg/dL Final    Serial Number: [id number]:  [id number]   • GFR MDRD Non  11/10/2021 101  mL/min/1.73 sq.M Final       ASSESSMENT/ PLAN:    Diagnoses and all orders for this visit:    1. Coronary artery disease involving native coronary artery of native heart without angina pectoris (Primary)  Assessment & Plan:  Is doing well at this time and denies any current anginal-like symptoms.      2. Essential hypertension  Assessment & Plan:  His blood pressure is good here as well as his oncology appointments.  He recently had a CMP which was within normal limits.      3. Hyperlipidemia, unspecified hyperlipidemia type  Assessment & Plan:  Update his lipid profile here today.    Orders:  -     Lipid Panel    4. 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 (HCC)  Assessment & Plan:  Overall he seems to be doing well.  He is finished his recent course of chemo with no evidence of disease on his most recent CT scan.  He has a follow-up appoint with Dr. Cartagena next week.    Orders:  -     gabapentin (NEURONTIN) 600 MG tablet; Take 1 tablet by mouth 3 (Three) Times a Day for 30  days.  Dispense: 90 tablet; Refill: 5    5. Chronic obstructive pulmonary disease, unspecified COPD type (HCC)  Assessment & Plan:  Overall his breathing is stable at this time.        6. Tobacco abuse  Assessment & Plan:  He states he is smoking less than a pack and a half a day right now.  His worst times are with his anxiety.      7. Influenza vaccine refused    Other orders  -     albuterol sulfate HFA (ProAir HFA) 108 (90 Base) MCG/ACT inhaler; Inhale 2 puffs Every 6 (Six) Hours As Needed for Wheezing for up to 30 days.  Dispense: 18 g; Refill: 2      Orders Placed Today:     New Medications Ordered This Visit   Medications   • gabapentin (NEURONTIN) 600 MG tablet     Sig: Take 1 tablet by mouth 3 (Three) Times a Day for 30 days.     Dispense:  90 tablet     Refill:  5   • albuterol sulfate HFA (ProAir HFA) 108 (90 Base) MCG/ACT inhaler     Sig: Inhale 2 puffs Every 6 (Six) Hours As Needed for Wheezing for up to 30 days.     Dispense:  18 g     Refill:  2        Management Plan:     An After Visit Summary was printed and given to the patient at discharge.    Follow-up: Return in about 6 months (around 5/29/2022) for Recheck.    Eugenio Cedeño,  11/29/2021 12:21 EST  This note was electronically signed.

## 2021-11-29 NOTE — ASSESSMENT & PLAN NOTE
His blood pressure is good here as well as his oncology appointments.  He recently had a CMP which was within normal limits.

## 2021-11-30 LAB
CHOLEST SERPL-MCNC: 126 MG/DL (ref 0–200)
HDLC SERPL-MCNC: 40 MG/DL (ref 40–60)
LDLC SERPL CALC-MCNC: 70 MG/DL (ref 0–100)
LDLC/HDLC SERPL: 1.75 {RATIO}
TRIGL SERPL-MCNC: 80 MG/DL (ref 0–150)
VLDLC SERPL-MCNC: 16 MG/DL (ref 5–40)

## 2021-12-30 ENCOUNTER — TRANSCRIBE ORDERS (OUTPATIENT)
Dept: ADMINISTRATIVE | Facility: HOSPITAL | Age: 56
End: 2021-12-30

## 2021-12-30 DIAGNOSIS — C34.90 MALIGNANT NEOPLASM OF BRONCHUS AND LUNG: Primary | ICD-10-CM

## 2022-01-11 ENCOUNTER — APPOINTMENT (OUTPATIENT)
Dept: CT IMAGING | Facility: HOSPITAL | Age: 57
End: 2022-01-11

## 2022-01-12 ENCOUNTER — HOSPITAL ENCOUNTER (OUTPATIENT)
Dept: CT IMAGING | Facility: HOSPITAL | Age: 57
Discharge: HOME | End: 2022-01-12
Admitting: INTERNAL MEDICINE

## 2022-01-12 DIAGNOSIS — C34.90 MALIGNANT NEOPLASM OF BRONCHUS AND LUNG: ICD-10-CM

## 2022-01-12 LAB — CREAT BLDA-MCNC: 0.9 MG/DL

## 2022-01-12 PROCEDURE — 0 IOPAMIDOL PER 1 ML: Performed by: INTERNAL MEDICINE

## 2022-01-12 PROCEDURE — 82565 ASSAY OF CREATININE: CPT

## 2022-01-12 PROCEDURE — 74177 CT ABD & PELVIS W/CONTRAST: CPT

## 2022-01-12 PROCEDURE — 71260 CT THORAX DX C+: CPT

## 2022-01-12 RX ADMIN — IOPAMIDOL 100 ML: 755 INJECTION, SOLUTION INTRAVENOUS at 13:45

## 2022-02-01 ENCOUNTER — TRANSCRIBE ORDERS (OUTPATIENT)
Dept: ADMINISTRATIVE | Facility: HOSPITAL | Age: 57
End: 2022-02-01

## 2022-02-01 DIAGNOSIS — C34.01 LUNG CANCER, MAIN BRONCHUS, RIGHT: Primary | ICD-10-CM

## 2022-02-02 RX ORDER — BUDESONIDE AND FORMOTEROL FUMARATE DIHYDRATE 160; 4.5 UG/1; UG/1
AEROSOL RESPIRATORY (INHALATION)
Qty: 10.2 G | Refills: 3 | Status: SHIPPED | OUTPATIENT
Start: 2022-02-02 | End: 2022-06-16

## 2022-02-16 DIAGNOSIS — R11.0 NAUSEA: Primary | ICD-10-CM

## 2022-02-16 PROBLEM — F99 MENTAL DISORDER: Status: ACTIVE | Noted: 2021-09-03

## 2022-02-16 RX ORDER — PROCHLORPERAZINE MALEATE 10 MG
TABLET ORAL
Qty: 28 TABLET | Refills: 3 | Status: SHIPPED | OUTPATIENT
Start: 2022-02-16 | End: 2022-09-19

## 2022-02-21 ENCOUNTER — OFFICE VISIT (OUTPATIENT)
Dept: FAMILY MEDICINE CLINIC | Facility: CLINIC | Age: 57
End: 2022-02-21

## 2022-02-21 VITALS
TEMPERATURE: 98 F | HEART RATE: 74 BPM | SYSTOLIC BLOOD PRESSURE: 107 MMHG | DIASTOLIC BLOOD PRESSURE: 62 MMHG | HEIGHT: 72 IN | OXYGEN SATURATION: 97 % | BODY MASS INDEX: 26.14 KG/M2 | WEIGHT: 193 LBS | RESPIRATION RATE: 18 BRPM

## 2022-02-21 DIAGNOSIS — M25.562 ACUTE PAIN OF LEFT KNEE: Primary | ICD-10-CM

## 2022-02-21 PROCEDURE — 99213 OFFICE O/P EST LOW 20 MIN: CPT | Performed by: FAMILY MEDICINE

## 2022-02-21 RX ORDER — MOMETASONE FUROATE AND FORMOTEROL FUMARATE DIHYDRATE 200; 5 UG/1; UG/1
AEROSOL RESPIRATORY (INHALATION)
COMMUNITY
Start: 2022-02-11 | End: 2022-06-16

## 2022-02-21 RX ORDER — HYDROCODONE BITARTRATE AND ACETAMINOPHEN 10; 325 MG/1; MG/1
1 TABLET ORAL 4 TIMES DAILY PRN
COMMUNITY
Start: 2022-02-17

## 2022-02-21 RX ORDER — GABAPENTIN 800 MG/1
TABLET ORAL
COMMUNITY
Start: 2022-02-02 | End: 2022-10-06 | Stop reason: SDUPTHER

## 2022-02-21 RX ORDER — NAPROXEN 500 MG/1
500 TABLET ORAL 2 TIMES DAILY WITH MEALS
Qty: 60 TABLET | Refills: 0 | Status: SHIPPED | OUTPATIENT
Start: 2022-02-21 | End: 2022-03-25

## 2022-02-21 RX ORDER — NITROGLYCERIN 0.4 MG/1
0.4 TABLET SUBLINGUAL
Qty: 25 TABLET | Refills: 4 | Status: SHIPPED | OUTPATIENT
Start: 2022-02-21

## 2022-02-21 NOTE — PROGRESS NOTES
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    Knee pain-he states he has had left knee pain for about the past month.  He denies any precipitating trauma or injury.  He does have some intermittent swelling but no locking or giving way.  The pain is worse with any extreme flexion of the knee.      PHQ-2 Depression Screening  Little interest or pleasure in doing things?     Feeling down, depressed, or hopeless?     PHQ-2 Total Score     PHQ-9 Depression Screening  Little interest or pleasure in doing things?     Feeling down, depressed, or hopeless?     Trouble falling or staying asleep, or sleeping too much?     Feeling tired or having little energy?     Poor appetite or overeating?     Feeling bad about yourself - or that you are a failure or have let yourself or your family down?     Trouble concentrating on things, such as reading the newspaper or watching television?     Moving or speaking so slowly that other people could have noticed? Or the opposite - being so fidgety or restless that you have been moving around a lot more than usual?     Thoughts that you would be better off dead, or of hurting yourself in some way?     PHQ-9 Total Score     If you checked off any problems, how difficult have these problems made it for you to do your work, take care of things at home, or get along with other people?       No Known Allergies    Prior to Admission medications    Medication Sig Start Date End Date Taking? Authorizing Provider   aspirin (aspirin) 81 MG EC tablet Aspir-81 81 mg oral tablet,delayed release (DR/EC) take 1 tablet (81 mg) by oral route  once daily   Active   Yes Provider, MD Asher   docusate sodium 100 MG capsule Take 100 mg by mouth. 9/24/21  Yes Asher Chambers MD    MG capsule  3/30/21  Yes Asher Chambers MD   Dulera 200-5 MCG/ACT inhaler  2/11/22  Yes Asher Chambers MD   DULoxetine (CYMBALTA) 30 MG capsule Take 30 mg by mouth every night at bedtime. 5/20/21  Yes Asher Chambers MD   ezetimibe (ZETIA) 10 MG tablet Take 10 mg by mouth Daily. 6/1/21  Yes Asher Chambers MD   famotidine (PEPCID) 20 MG tablet Take 20 mg by mouth. 9/22/21 9/22/22 Yes Asher Chambers MD   gabapentin (NEURONTIN) 800 MG tablet  2/2/22  Yes Asher Chambers MD   HYDROcodone-acetaminophen (NORCO)  MG per tablet Take 1 tablet by mouth 4 (Four) Times a Day As Needed. 2/17/22  Yes Asher Chambers MD   hydrOXYzine (ATARAX) 50 MG tablet Take 1 tablet by mouth 3 (Three) Times a Day As Needed. 6/21/21  Yes Provider, MD Asher   ipratropium-albuterol (DUO-NEB) 0.5-2.5 mg/3 ml nebulizer INHALE CONTENTS OF 1 VIAL BY NEBULIZATION EVERY 4 HOURS AS NEEDED FOR SHORTNESS OF BREATHE 5/4/21  Yes ProviderAsher MD   lisinopril (PRINIVIL,ZESTRIL) 10 MG tablet Take 10 mg by mouth Daily. 5/17/21  Yes Asher Chambers MD   metoprolol succinate XL (TOPROL-XL) 25 MG 24 hr tablet Take 12.5 mg by mouth 2 (Two) Times a Day. 5/17/21  Yes ProviderAsher MD   omeprazole (priLOSEC) 40 MG capsule Take 40 mg by mouth Daily. before a meal 5/17/21  Yes Asher Chambers MD   ondansetron (ZOFRAN) 8 MG tablet Take 8 mg by mouth Every 8 (Eight) Hours As Needed. for nausea 5/20/21  Yes Asher Chambers MD   prazosin (MINIPRESS) 2 MG capsule  11/7/21  Yes Asher Chambers MD   ProAir  (90 Base) MCG/ACT inhaler  2/10/22  Yes Provider, MD Asher   prochlorperazine (COMPAZINE) 10 MG tablet TAKE ONE TABLET BY MOUTH EVERY 6 HOURS AS NEEDED FOR NAUSEA AND/OR VOMITING 2/16/22  Yes Clayton Kign MD    QUEtiapine (SEROquel) 300 MG tablet Take 300 mg by mouth Every Night. 5/17/21  Yes Asher Chambers MD   rosuvastatin (CRESTOR) 40 MG tablet Take 40 mg by mouth Daily. 5/4/21  Yes Asher Chambers MD   Symbicort 160-4.5 MCG/ACT inhaler INHALE TWO PUFFS BY MOUTH TWICE A DAY 2/2/22  Yes Rome Simpson MD   nitroglycerin (NITRO-BID) 2.5 MG CR capsule     Asher Chambers MD   dexamethasone (DECADRON) 4 MG tablet TAKE ONE TABLET BY MOUTH TWICE DAILY THE DAY PRIOR TO CHEMO, THE DAY OF CHEMO, AND THE DAY AFTER CHEMO 5/20/21 2/21/22  Asher Chambers MD   gabapentin (NEURONTIN) 600 MG tablet Take 1 tablet by mouth 3 (Three) Times a Day for 30 days. 11/29/21 2/21/22  Eugenio Cedeño, DO   lidocaine (LIDODERM) 5 %  11/14/21 2/21/22  Asher Chambers MD   naloxone (NARCAN) 4 MG/0.1ML nasal spray 4 mg into the nostril(s) as directed by provider. 9/22/21 2/21/22  Asher Chambers MD        Patient Active Problem List   Diagnosis   • 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 (HCC)   • Squamous cell carcinoma of lung (HCC)   • Lung nodule   • Tobacco abuse   • Liver lesion   • Anxiety   • Arthritis   • Bipolar 1 disorder, mixed, mild (HCC)   • Cervical spinal stenosis   • Cervical spondylosis with radiculopathy   • Cervicalgia   • Coronary artery disease   • Counseling on substance use and abuse   • Depression   • Essential hypertension   • Hyperlipemia   • Lung cancer (HCC)   • Old myocardial infarction   • Other and unspecified disc disorder of cervical region   • COPD (chronic obstructive pulmonary disease) (HCC)   • Influenza vaccine refused   • Mental disorder   • Acute pain of left knee        Past Surgical History:   Procedure Laterality Date   • LUNG BIOPSY     • LUNG REMOVAL, PARTIAL  03/26/2021    [place]       Social History     Socioeconomic History   • Marital status:    Tobacco Use   • Smoking status: Current Every Day Smoker     Packs/day: 1.50      "Years: 40.00     Pack years: 60.00   • Smokeless tobacco: Former User     Types: Chew   • Tobacco comment: 1PPD CURRENTLY   Vaping Use   • Vaping Use: Never used   Substance and Sexual Activity   • Alcohol use: Not Currently   • Drug use: Yes     Comment: PAIN KILLERS       Family History   Adopted: Yes   Problem Relation Age of Onset   • No Known Problems Mother    • No Known Problems Father    • No Known Problems Sister    • No Known Problems Brother    • No Known Problems Maternal Grandmother    • No Known Problems Maternal Grandfather    • No Known Problems Paternal Grandmother    • No Known Problems Paternal Grandfather    • No Known Problems Other        Family history, surgical history, past medical history, Allergies and med's reviewed with patient today and updated in [employer] EMR.     ROS:  Review of Systems   Musculoskeletal: Positive for arthralgias (Left knee) and joint swelling.       OBJECTIVE:  Vitals:    02/21/22 0858   BP: 107/62   BP Location: Left arm   Patient Position: Sitting   Cuff Size: Adult   Pulse: 74   Resp: 18   Temp: 98 °F (36.7 °C)   TempSrc: Temporal   SpO2: 97%   Weight: 87.5 kg (193 lb)   Height: 182.9 cm (72\")     No exam data present   Body mass index is 26.18 kg/m².  No LMP for male patient.    Physical Exam  Vitals and nursing note reviewed.   Constitutional:       General: He is not in acute distress.     Appearance: Normal appearance. He is normal weight.   HENT:      Head: Normocephalic.   Musculoskeletal:      Left knee: No swelling, deformity, effusion or crepitus. Normal range of motion. Tenderness (Patellar) present. No LCL laxity, MCL laxity, ACL laxity or PCL laxity.Normal patellar mobility.      Instability Tests: Anterior drawer test negative. Posterior drawer test negative. Anterior Lachman test negative. Medial Vida test negative and lateral Vida test negative.   Neurological:      Mental Status: He is alert.         Procedures    No visits with results within " 30 Day(s) from this visit.   Latest known visit with results is:   Hospital Outpatient Visit on 01/12/2022   Component Date Value Ref Range Status   • Creatinine 01/12/2022 0.90  mg/dL Final    Serial Number: [id number]:  [id number]   • GFR MDRD Non  01/12/2022 88  mL/min/1.73 sq.M Final       ASSESSMENT/ PLAN:    Diagnoses and all orders for this visit:    1. Acute pain of left knee (Primary)  Assessment & Plan:  We will get an x-ray of his left knee and start some routine anti-inflammatories.    Orders:  -     XR Knee 3 View Left; Future    Other orders  -     naproxen (Naprosyn) 500 MG tablet; Take 1 tablet by mouth 2 (Two) Times a Day With Meals for 30 days.  Dispense: 60 tablet; Refill: 0  -     nitroglycerin (Nitrostat) 0.4 MG SL tablet; Place 1 tablet under the tongue Every 5 (Five) Minutes As Needed for Chest Pain. Take no more than 3 doses in 15 minutes.  Dispense: 25 tablet; Refill: 4      Orders Placed Today:     New Medications Ordered This Visit   Medications   • naproxen (Naprosyn) 500 MG tablet     Sig: Take 1 tablet by mouth 2 (Two) Times a Day With Meals for 30 days.     Dispense:  60 tablet     Refill:  0   • nitroglycerin (Nitrostat) 0.4 MG SL tablet     Sig: Place 1 tablet under the tongue Every 5 (Five) Minutes As Needed for Chest Pain. Take no more than 3 doses in 15 minutes.     Dispense:  25 tablet     Refill:  4        Management Plan:     An After Visit Summary was printed and given to the patient at discharge.    Follow-up: Return in about 4 weeks (around 3/21/2022) for Recheck.    Eugenio Cedeño,  2/21/2022 09:20 EST  This note was electronically signed.  Answers for HPI/ROS submitted by the patient on 2/20/2022  What is the primary reason for your visit?: Other  Please describe your symptoms.: When bending my leg it feels like something is tearing an very painful  Have you had these symptoms before?: No  How long have you been having these symptoms?: Greater  than 2 weeks

## 2022-03-01 ENCOUNTER — HOSPITAL ENCOUNTER (OUTPATIENT)
Dept: CT IMAGING | Facility: HOSPITAL | Age: 57
Discharge: HOME | End: 2022-03-01
Admitting: THORACIC SURGERY (CARDIOTHORACIC VASCULAR SURGERY)

## 2022-03-01 DIAGNOSIS — C34.01 LUNG CANCER, MAIN BRONCHUS, RIGHT: ICD-10-CM

## 2022-03-01 PROCEDURE — 71250 CT THORAX DX C-: CPT

## 2022-03-01 RX ORDER — LISINOPRIL 10 MG/1
TABLET ORAL
Qty: 72 TABLET | Refills: 0 | Status: SHIPPED | OUTPATIENT
Start: 2022-03-01 | End: 2022-03-04

## 2022-03-01 RX ORDER — OMEPRAZOLE 40 MG/1
CAPSULE, DELAYED RELEASE ORAL
Qty: 72 CAPSULE | Refills: 0 | Status: SHIPPED | OUTPATIENT
Start: 2022-03-01 | End: 2022-03-04

## 2022-03-01 RX ORDER — METOPROLOL SUCCINATE 25 MG/1
TABLET, EXTENDED RELEASE ORAL
Qty: 72 TABLET | Refills: 0 | Status: SHIPPED | OUTPATIENT
Start: 2022-03-01 | End: 2022-03-04

## 2022-03-04 RX ORDER — ROSUVASTATIN CALCIUM 40 MG/1
TABLET, COATED ORAL
Qty: 90 TABLET | Refills: 0 | Status: SHIPPED | OUTPATIENT
Start: 2022-03-04 | End: 2022-08-09

## 2022-03-04 RX ORDER — LISINOPRIL 10 MG/1
TABLET ORAL
Qty: 90 TABLET | Refills: 0 | Status: SHIPPED | OUTPATIENT
Start: 2022-03-04 | End: 2022-09-09

## 2022-03-04 RX ORDER — OMEPRAZOLE 40 MG/1
CAPSULE, DELAYED RELEASE ORAL
Qty: 90 CAPSULE | Refills: 0 | Status: SHIPPED | OUTPATIENT
Start: 2022-03-04 | End: 2022-10-07 | Stop reason: SDUPTHER

## 2022-03-04 RX ORDER — EZETIMIBE 10 MG/1
TABLET ORAL
Qty: 90 TABLET | Refills: 3 | Status: SHIPPED | OUTPATIENT
Start: 2022-03-04

## 2022-03-04 RX ORDER — METOPROLOL SUCCINATE 25 MG/1
TABLET, EXTENDED RELEASE ORAL
Qty: 90 TABLET | Refills: 0 | Status: SHIPPED | OUTPATIENT
Start: 2022-03-04 | End: 2022-08-09

## 2022-03-17 ENCOUNTER — TELEPHONE (OUTPATIENT)
Dept: FAMILY MEDICINE CLINIC | Facility: CLINIC | Age: 57
End: 2022-03-17

## 2022-03-17 NOTE — TELEPHONE ENCOUNTER
Hub staff attempted to follow warm transfer process and was unsuccessful     Caller: Hollis Haji    Relationship to patient: Self    Best call back number: [phone]    Patient is needing: THE PATIENT STATED HE HAD REQUESTED IMAGING OF HIS KNEE FROM PCP AND HAS BEEN WAITING ON A CALL BACK TO CONFIRM. HE STATED HE CONTACTED INSURANCE AND A PRIOR AUTHORIZATION WAS NOT NEEDED. HE WOULD LIKE A CALL BACK TO DISCUSS

## 2022-03-25 RX ORDER — NAPROXEN 500 MG/1
TABLET ORAL
Qty: 60 TABLET | Refills: 1 | Status: SHIPPED | OUTPATIENT
Start: 2022-03-25 | End: 2022-05-23

## 2022-05-17 RX ORDER — ONDANSETRON HYDROCHLORIDE 8 MG/1
TABLET, FILM COATED ORAL
Qty: 30 TABLET | OUTPATIENT
Start: 2022-05-17

## 2022-05-18 ENCOUNTER — TRANSCRIBE ORDERS (OUTPATIENT)
Dept: ADMINISTRATIVE | Facility: HOSPITAL | Age: 57
End: 2022-05-18

## 2022-05-18 DIAGNOSIS — C34.31 SQUAMOUS CELL CARCINOMA OF BRONCHUS IN RIGHT LOWER LOBE: Primary | ICD-10-CM

## 2022-05-23 RX ORDER — NAPROXEN 500 MG/1
TABLET ORAL
Qty: 60 TABLET | Refills: 1 | Status: SHIPPED | OUTPATIENT
Start: 2022-05-23 | End: 2022-08-08

## 2022-05-27 ENCOUNTER — HOSPITAL ENCOUNTER (OUTPATIENT)
Dept: CT IMAGING | Facility: HOSPITAL | Age: 57
Discharge: HOME | End: 2022-05-27

## 2022-05-27 DIAGNOSIS — C34.31 SQUAMOUS CELL CARCINOMA OF BRONCHUS IN RIGHT LOWER LOBE: ICD-10-CM

## 2022-06-10 RX ORDER — ONDANSETRON HYDROCHLORIDE 8 MG/1
TABLET, FILM COATED ORAL
Qty: 30 TABLET | OUTPATIENT
Start: 2022-06-10

## 2022-06-16 RX ORDER — BUDESONIDE AND FORMOTEROL FUMARATE DIHYDRATE 160; 4.5 UG/1; UG/1
AEROSOL RESPIRATORY (INHALATION)
Qty: 10.2 G | Refills: 3 | Status: SHIPPED | OUTPATIENT
Start: 2022-06-16 | End: 2022-10-13 | Stop reason: SDUPTHER

## 2022-07-07 ENCOUNTER — HOSPITAL ENCOUNTER (OUTPATIENT)
Dept: CT IMAGING | Facility: HOSPITAL | Age: 57
Discharge: HOME | End: 2022-07-07
Admitting: INTERNAL MEDICINE

## 2022-07-07 LAB
CREAT BLDA-MCNC: 0.9 MG/DL
EGFRCR SERPLBLD CKD-EPI 2021: 100.9 ML/MIN/1.73

## 2022-07-07 PROCEDURE — 0 IOPAMIDOL PER 1 ML: Performed by: INTERNAL MEDICINE

## 2022-07-07 PROCEDURE — 74177 CT ABD & PELVIS W/CONTRAST: CPT

## 2022-07-07 PROCEDURE — 82565 ASSAY OF CREATININE: CPT

## 2022-07-07 PROCEDURE — 71260 CT THORAX DX C+: CPT

## 2022-07-07 RX ADMIN — IOPAMIDOL 100 ML: 755 INJECTION, SOLUTION INTRAVENOUS at 10:10

## 2022-07-20 RX ORDER — ONDANSETRON HYDROCHLORIDE 8 MG/1
TABLET, FILM COATED ORAL
Qty: 30 TABLET | Refills: 0 | Status: SHIPPED | OUTPATIENT
Start: 2022-07-20 | End: 2022-09-09

## 2022-08-08 RX ORDER — NAPROXEN 500 MG/1
TABLET ORAL
Qty: 60 TABLET | Refills: 1 | Status: SHIPPED | OUTPATIENT
Start: 2022-08-08 | End: 2022-10-11 | Stop reason: SDUPTHER

## 2022-08-09 RX ORDER — ROSUVASTATIN CALCIUM 40 MG/1
TABLET, COATED ORAL
Qty: 30 TABLET | Refills: 0 | Status: SHIPPED | OUTPATIENT
Start: 2022-08-09 | End: 2022-09-09

## 2022-08-09 RX ORDER — METOPROLOL SUCCINATE 25 MG/1
TABLET, EXTENDED RELEASE ORAL
Qty: 30 TABLET | Refills: 0 | Status: SHIPPED | OUTPATIENT
Start: 2022-08-09 | End: 2022-09-09

## 2022-08-12 RX ORDER — MOMETASONE FUROATE AND FORMOTEROL FUMARATE DIHYDRATE 200; 5 UG/1; UG/1
AEROSOL RESPIRATORY (INHALATION)
Qty: 13 G | OUTPATIENT
Start: 2022-08-12

## 2022-09-09 DIAGNOSIS — R11.0 NAUSEA: Primary | ICD-10-CM

## 2022-09-09 RX ORDER — LISINOPRIL 10 MG/1
TABLET ORAL
Qty: 30 TABLET | Refills: 0 | Status: SHIPPED | OUTPATIENT
Start: 2022-09-09 | End: 2022-10-11 | Stop reason: SDUPTHER

## 2022-09-09 RX ORDER — ONDANSETRON HYDROCHLORIDE 8 MG/1
TABLET, FILM COATED ORAL
Qty: 30 TABLET | Refills: 0 | Status: SHIPPED | OUTPATIENT
Start: 2022-09-09 | End: 2022-09-21

## 2022-09-09 RX ORDER — ROSUVASTATIN CALCIUM 40 MG/1
TABLET, COATED ORAL
Qty: 30 TABLET | Refills: 0 | Status: SHIPPED | OUTPATIENT
Start: 2022-09-09 | End: 2022-10-11 | Stop reason: SDUPTHER

## 2022-09-09 RX ORDER — METOPROLOL SUCCINATE 25 MG/1
TABLET, EXTENDED RELEASE ORAL
Qty: 30 TABLET | Refills: 0 | Status: SHIPPED | OUTPATIENT
Start: 2022-09-09 | End: 2022-10-11 | Stop reason: SDUPTHER

## 2022-09-18 DIAGNOSIS — R11.0 NAUSEA: ICD-10-CM

## 2022-09-19 RX ORDER — PROCHLORPERAZINE MALEATE 10 MG
TABLET ORAL
Qty: 28 TABLET | Refills: 3 | Status: SHIPPED | OUTPATIENT
Start: 2022-09-19 | End: 2022-10-06

## 2022-09-19 RX ORDER — MOMETASONE FUROATE AND FORMOTEROL FUMARATE DIHYDRATE 200; 5 UG/1; UG/1
AEROSOL RESPIRATORY (INHALATION)
Qty: 13 G | OUTPATIENT
Start: 2022-09-19

## 2022-09-20 ENCOUNTER — TRANSCRIBE ORDERS (OUTPATIENT)
Dept: ADMINISTRATIVE | Facility: HOSPITAL | Age: 57
End: 2022-09-20

## 2022-09-20 DIAGNOSIS — C34.11 MALIGNANT NEOPLASM OF RIGHT UPPER LOBE OF LUNG: Primary | ICD-10-CM

## 2022-09-20 NOTE — TELEPHONE ENCOUNTER
I do not see any letters or referral from Dr. Heller.  I have not seen this patient in 7 months either.  Before leaving consider taking over this medicine he would need to be seen and evaluated first.  Only at that time we will consider refilling that or not.

## 2022-09-20 NOTE — TELEPHONE ENCOUNTER
Caller: Hollis Haji    Relationship: Self    Best call back number: [phone]    Requested Prescriptions:   Requested Prescriptions     Pending Prescriptions Disp Refills   • gabapentin (NEURONTIN) 800 MG tablet          Pharmacy where request should be sent: [facility] [place] [facility] [id number] [address] - [phone]  - [phone] FX     Additional details provided by patient: DR. SOLORZANO ADVISED PATIENT THAT DR. SALDANA NEEDS TO TAKE OVER PRESCRIBING THIS MEDICATION. PATIENT ALSO NEEDS REFILLS OF OTHER MEDICATIONS AND WOULD LIKE TO SPEAK WITH SOMEONE ON THE CLINICAL TEAM BEFORE HE REQUESTS THE REFILLS  HE HAS BEEN OUT OF GABAPENTIN FOR A WEEK      Does the patient have less than a 3 day supply:  [x] Yes  [] No    Alethea Diaz Rep   09/20/22 12:32 EDT

## 2022-09-21 DIAGNOSIS — R11.0 NAUSEA: ICD-10-CM

## 2022-09-21 RX ORDER — ONDANSETRON HYDROCHLORIDE 8 MG/1
TABLET, FILM COATED ORAL
Qty: 30 TABLET | Refills: 0 | Status: SHIPPED | OUTPATIENT
Start: 2022-09-21 | End: 2022-10-11 | Stop reason: SDUPTHER

## 2022-09-21 RX ORDER — MOMETASONE FUROATE AND FORMOTEROL FUMARATE DIHYDRATE 200; 5 UG/1; UG/1
AEROSOL RESPIRATORY (INHALATION)
Qty: 13 G | OUTPATIENT
Start: 2022-09-21

## 2022-09-26 ENCOUNTER — TELEPHONE (OUTPATIENT)
Dept: FAMILY MEDICINE CLINIC | Facility: CLINIC | Age: 57
End: 2022-09-26

## 2022-09-26 RX ORDER — GABAPENTIN 800 MG/1
TABLET ORAL
OUTPATIENT
Start: 2022-09-26

## 2022-09-26 NOTE — TELEPHONE ENCOUNTER
I know we are not refilling pts meds at this timedue to not seeing him in 7 months. However, pt established care with different provider on 5.31.22 and his last appt with  was 2.21.22. We can get him on the schedule, however would you like this as a new pt since he is technically under someone else's care? Please advise.

## 2022-09-26 NOTE — TELEPHONE ENCOUNTER
There is separate encounter on this forwarded tp Lily. Pt established care else where in May of 2022. Pt will need appt as new pt.

## 2022-09-26 NOTE — TELEPHONE ENCOUNTER
He has he will have to be a new patient.  Also I have not got any information from his previous provider who it had him on this.  Without this information I may be reluctant to continue this medication.

## 2022-09-26 NOTE — TELEPHONE ENCOUNTER
Called patient advised he would have to be a new patient with Dr. Cedeño and obtain medical records. I will make him an appointment and send through [facility].

## 2022-10-03 NOTE — PROGRESS NOTES
Primary Care Provider  Eugenio Cedeño DO     Referring Provider  No ref. provider found     Chief Complaint  Cough, Wheezing, Shortness of Breath, and Follow-up (Medication refills. )    Subjective          History of Presenting Illness  Patient is a 57-year-old male, patient of Dr. Watson who presents for management of COPD and has a history of squamous cell carcinoma who presents for a follow-up visit today.  Patient's wife is present with the patient in the office today.  Patient states that he does get short of breath that is worse with exertion, moderate in severity, and improved with rest.  Of note, when patient ambulated back to the exam room his oxygen dropped down to 82% on room air.  2 L of oxygen were applied and patient's O2 saturation came back up to 98%.  Patient states that he is taking Symbicort every day as prescribed and uses albuterol inhaler as needed.  Patient states that he has been out of DuoNeb nebulizer treatments.  Patient states that he is scheduled to have a follow-up chest CT scan on 11/2/2022 and is then scheduled to follow-up with Dr. Simpson on 11/8/2022.  Patient states that he is still smoking and would like some nicotine patches and nicotine lozenges to help him to quit.  Patient denies fever, chills, night sweats, swollen glands in the head and neck, unintentional weight loss, hemoptysis, purulent sputum production, dysphagia, chest pain, palpitations, chest tightness, abdominal pain, nausea, vomiting, and diarrhea.  Patient also denies any myalgias, changes in sense of taste and/or smell, sore throat, any other coronavirus or flu-like symptoms.  Patient denies any leg swelling, orthopnea, paroxysmal nocturnal dyspnea.  Patient is able to perform activities of daily living.        Review of Systems   Constitutional: Negative for activity change, appetite change, chills, diaphoresis, fatigue, fever, unexpected weight gain and unexpected weight loss.        Negative for  Insomnia   HENT: Negative for congestion (Nasal), mouth sores, nosebleeds, postnasal drip, sore throat, swollen glands and trouble swallowing.         Negative for Thrush  Negative for Hoarseness  Negative for Allergies/Hay Fever  Negative for Recent Head injury  Negative for Ear Fullness  Negative for Nasal or Sinus pain  Negative for Dry lips  Negative for Nasal discharge   Respiratory: Positive for cough (intermittent) and shortness of breath. Negative for apnea, chest tightness and wheezing.         Negative for Hemoptysis  Negative for Pleuritic pain   Cardiovascular: Negative for chest pain, palpitations and leg swelling.        Negative for Claudication  Negative for Cyanosis  Negative for Dyspnea on exertion   Gastrointestinal: Negative for abdominal pain, diarrhea, nausea, vomiting and GERD.   Musculoskeletal: Negative for joint swelling and myalgias.        Negative for Joint pain  Negative for Joint stiffness   Skin: Negative for color change, dry skin, pallor and rash.   Neurological: Negative for syncope, weakness and headache.   Hematological: Negative for adenopathy. Does not bruise/bleed easily.        Family History   Adopted: Yes   Problem Relation Age of Onset   • No Known Problems Mother    • No Known Problems Father    • No Known Problems Sister    • No Known Problems Brother    • No Known Problems Maternal Grandmother    • No Known Problems Maternal Grandfather    • No Known Problems Paternal Grandmother    • No Known Problems Paternal Grandfather    • No Known Problems Other         Social History     Socioeconomic History   • Marital status:    Tobacco Use   • Smoking status: Every Day     Packs/day: 1.00     Years: 40.00     Pack years: 40.00     Types: Cigarettes   • Smokeless tobacco: Former     Types: Chew   • Tobacco comments:     1PPD CURRENTLY   Vaping Use   • Vaping Use: Never used   Substance and Sexual Activity   • Alcohol use: Not Currently   • Drug use: Yes     Comment:  PAIN KILLERS        Past Medical History:   Diagnosis Date   • Acid reflux disease    • Anxiety    • Arthritis    • COPD (chronic obstructive pulmonary disease) (HCC)    • Depression    • Heart attack (HCC)     2 STNTS   • Lung cancer (HCC)    • PTSD (post-traumatic stress disorder)    • SOB (shortness of breath)    • 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 (HCC) 6/7/2021        Immunization History   Administered Date(s) Administered   • COVID-19 (PFIZER) PURPLE CAP 04/13/2021, 04/13/2021, 05/04/2021, 05/04/2021   • Flu Vaccine Quad PF >36MO 10/29/2019, 11/17/2020   • Flu Vaccine Split Quad 10/29/2019, 11/17/2020   • FluLaval/Fluzone >6mos 10/29/2019, 11/17/2020, 10/13/2022   • Fluzone Quad >6mos (Multi-dose) 10/30/2018, 09/30/2019   • Influenza, Unspecified 12/01/2020   • Pneumococcal Polysaccharide (PPSV23) 01/08/2018       No Known Allergies       Current Outpatient Medications:   •  albuterol sulfate  (90 Base) MCG/ACT inhaler, Inhale 2 puffs Every 4 (Four) Hours As Needed for Wheezing or Shortness of Air for up to 30 days. Pt requesting Ventolin, Disp: 18 g, Rfl: 11  •  aspirin 81 MG EC tablet, Aspir-81 81 mg oral tablet,delayed release (DR/EC) take 1 tablet (81 mg) by oral route once daily   Active, Disp: , Rfl:   •  budesonide-formoterol (Symbicort) 160-4.5 MCG/ACT inhaler, Inhale 2 puffs 2 (Two) Times a Day for 30 days. Rinse mouth out after each use, Disp: 1 each, Rfl: 11  •  DULoxetine (CYMBALTA) 30 MG capsule, Take 1 capsule by mouth every night at bedtime., Disp: 90 capsule, Rfl: 1  •  ezetimibe (ZETIA) 10 MG tablet, TAKE ONE TABLET BY MOUTH DAILY, Disp: 90 tablet, Rfl: 3  •  famotidine (PEPCID) 20 MG tablet, Take 1 tablet by mouth 2 (Two) Times a Day., Disp: 180 tablet, Rfl: 1  •  gabapentin (NEURONTIN) 800 MG tablet, Take 1 tablet by mouth 3 (Three) Times a Day., Disp: 90 tablet, Rfl: 5  •  HYDROcodone-acetaminophen (NORCO)  MG per tablet, Take 1 tablet by mouth 4 (Four)  Times a Day As Needed., Disp: , Rfl:   •  hydrOXYzine (ATARAX) 50 MG tablet, Take 1 tablet by mouth 3 (Three) Times a Day As Needed for Anxiety., Disp: 90 tablet, Rfl: 1  •  ipratropium-albuterol (DUO-NEB) 0.5-2.5 mg/3 ml nebulizer, Take 3 mL by nebulization Every 4 (Four) Hours As Needed for Wheezing or Shortness of Air for up to 30 days., Disp: 360 mL, Rfl: 11  •  lisinopril (PRINIVIL,ZESTRIL) 10 MG tablet, Take 1 tablet by mouth Daily., Disp: 90 tablet, Rfl: 1  •  metoprolol succinate XL (TOPROL-XL) 25 MG 24 hr tablet, Take 0.5 tablets by mouth 2 (Two) Times a Day., Disp: 180 tablet, Rfl: 1  •  naproxen (NAPROSYN) 500 MG tablet, Take 1 tablet by mouth 2 (Two) Times a Day With Meals., Disp: 60 tablet, Rfl: 5  •  nitroglycerin (Nitrostat) 0.4 MG SL tablet, Place 1 tablet under the tongue Every 5 (Five) Minutes As Needed for Chest Pain. Take no more than 3 doses in 15 minutes., Disp: 25 tablet, Rfl: 4  •  omeprazole (priLOSEC) 40 MG capsule, Take 1 capsule by mouth Every Morning Before Breakfast., Disp: 90 capsule, Rfl: 3  •  ondansetron (ZOFRAN) 8 MG tablet, Take 1 tablet by mouth Every 8 (Eight) Hours As Needed for Nausea. for nausea, Disp: 30 tablet, Rfl: 3  •  prazosin (MINIPRESS) 2 MG capsule, , Disp: , Rfl:   •  prochlorperazine (COMPAZINE) 10 MG tablet, TAKE 1 TABLET BY MOUTH EVERY 6 HOURS AS NEEDED FORNAUSEA AND VOMITING, Disp: , Rfl:   •  promethazine (PHENERGAN) 25 MG tablet, , Disp: , Rfl:   •  QUEtiapine (SEROquel) 300 MG tablet, Take 300 mg by mouth Every Night., Disp: , Rfl:   •  rosuvastatin (CRESTOR) 40 MG tablet, Take 1 tablet by mouth every night at bedtime., Disp: 90 tablet, Rfl: 1  •  venlafaxine XR (EFFEXOR-XR) 37.5 MG 24 hr capsule, , Disp: , Rfl:   •  nicotine (NICODERM CQ) 21 MG/24HR patch, Place 1 patch on the skin as directed by provider Daily., Disp: 30 patch, Rfl: 0  •  nicotine polacrilex (COMMIT) 2 MG lozenge, Dissolve 1 lozenge in the mouth As Needed for Smoking Cessation for up to 30  "days., Disp: 120 each, Rfl: 11  •  tiotropium bromide monohydrate (SPIRIVA RESPIMAT) 2.5 MCG/ACT aerosol solution inhaler, Inhale 2 puffs Daily for 30 days., Disp: 1 each, Rfl: 11     Objective     Physical Exam  Vital Signs:   WDWN, Alert, NAD.    HEENT:  PERRL, EOMI.  OP, nares clear, no sinus tenderness  Neck:  Supple, no JVD, no thyromegaly.  Lymph: no axillary, cervical, supraclavicular lymphadenopathy noted bilaterally  Chest:  decreased breath sounds throughout. No wheezes, rales, or rhonchi appreciated.  Normal work of breathing noted.  Patient is able speak full sentences without difficulty.  CV: RRR, no MGR, pulses 2+, equal.  Abd:  Soft, NT, ND, + BS, no HSM  EXT:  no clubbing, no cyanosis, no edema, no joint tenderness  Neuro:  A&Ox3, CN grossly intact, no focal deficits.  Skin: No rashes or lesions noted.    /79 (BP Location: Right arm, Patient Position: Sitting, Cuff Size: Adult)   Pulse 70   Temp 98.6 °F (37 °C) (Temporal)   Resp 20   Ht 182.9 cm (72.01\")   Wt 83 kg (182 lb 14.4 oz)   SpO2 (!) 82% Comment: Room air.  BMI 24.80 kg/m²         Result Review :   I have reviewed     Procedures:         Assessment and Plan      Assessment:  1. COPD without acute exacerbation.  Patient on LABA ICS therapy.    2. 3.8 cm right hilar mass, positive for squamous cell carcinoma with VATS exploration with posterior lateral thoracotomy and lower and middle bilobectomy. Patient care Dr. Calvin King.    3. History of streptococcuspneumoniae and streptococcus agalacticae group B on bronch. Pt treated.  4. 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 Villa, did not light up on PET scan.   5. History of lung nodule.      6. Tobacco abuse with cigarettes, ongoing.     7.  Encounter for immunization.  Flu vaccine given to the patient in the office today.         Plan:  1.    Continue Symbicort as prescribed and rinse mouth out after each use.  2.  Will start " patient on Spiriva 2.5 mcg 2 puffs once daily.  3.  Continue albuterol inhaler and DuoNeb nebulizer treatments as needed.  4. Of note, when patient ambulated back to the exam room his oxygen dropped down to 82% on room air.  2 L of oxygen were applied and patient's O2 saturation came back up to 98%.  Clinical coordinator has set the patient up with oxygen prior to patient leaving the office today.  I also placed an order for patient to have a portable oxygen concentrator.  5.  Patient is scheduled to have a chest CT scan on 11/2/2022 that has been ordered by Dr. Simpson.  6.  Patient to follow-up with Dr. Simpson as scheduled.  7.  Vaccination status: Flu vaccine given to the patient in the office today.  Patient reports they are up-to-date with pneumococcal 23, Prevnar when he is 67.  Patient reports that he is up-to-date with his Covid vaccines.  Patient is advised to continue to follow CDC recommendations such as social distancing wearing a mask and washing hands for at least 20 seconds.  8.  Smoking status: patient is a current cigarette smoker.   Patient reports that they have been smoking cigarettes.  Patient started smoking about 40 years ago.  Patient has a 40.00 pack-year smoking history.  Patient has never used smokeless tobacco.  I have educated patient on the risk of diseases from using tobacco products such as cancer, COPD and heart disease. I advised patient to quit and patient is willing to quit. We have discussed the following method/s for tobacco cessation:  Education Material Counseling [place].  Nicotine patches and nicotine lozenges sent to the pharmacy today.  How to take medications and possible side effects of medications discussed with the patient.  Patient verbalized understanding and compliance. Together we have set a quit date for 2 weeks from today.  Patient will follow up in 2 months or sooner to check on their progress. I spent 4 minutes counseling the patient.  9.  Patient to call  the office, 911, or go to the ER with new or worsening symptoms.  10.  Follow-up in 4 months, sooner if needed.             Follow Up   Return for 4 months with Dr. Simpson.  Patient was given instructions and counseling regarding his condition or for health maintenance advice. Please see specific information pulled into the AVS if appropriate.

## 2022-10-04 RX ORDER — OMEPRAZOLE 40 MG/1
CAPSULE, DELAYED RELEASE ORAL
Qty: 90 CAPSULE | Refills: 0 | OUTPATIENT
Start: 2022-10-04

## 2022-10-04 NOTE — TELEPHONE ENCOUNTER
S/w  in regards to this pt establishing care else wear.  will see him on 10.6.22 for med f/u and will not prescribe anything until seen. Pt may grab this medication over the counter if he can not wait until appt

## 2022-10-06 ENCOUNTER — OFFICE VISIT (OUTPATIENT)
Dept: FAMILY MEDICINE CLINIC | Facility: CLINIC | Age: 57
End: 2022-10-06

## 2022-10-06 VITALS
TEMPERATURE: 98 F | SYSTOLIC BLOOD PRESSURE: 119 MMHG | HEIGHT: 72 IN | WEIGHT: 181.4 LBS | HEART RATE: 97 BPM | OXYGEN SATURATION: 99 % | DIASTOLIC BLOOD PRESSURE: 67 MMHG | BODY MASS INDEX: 24.57 KG/M2

## 2022-10-06 DIAGNOSIS — G62.9 NEUROPATHY: ICD-10-CM

## 2022-10-06 DIAGNOSIS — I10 ESSENTIAL HYPERTENSION: ICD-10-CM

## 2022-10-06 DIAGNOSIS — I25.10 CORONARY ARTERY DISEASE INVOLVING NATIVE CORONARY ARTERY OF NATIVE HEART WITHOUT ANGINA PECTORIS: Primary | ICD-10-CM

## 2022-10-06 DIAGNOSIS — E78.5 HYPERLIPIDEMIA, UNSPECIFIED HYPERLIPIDEMIA TYPE: ICD-10-CM

## 2022-10-06 DIAGNOSIS — J44.9 CHRONIC OBSTRUCTIVE PULMONARY DISEASE, UNSPECIFIED COPD TYPE: ICD-10-CM

## 2022-10-06 DIAGNOSIS — R89.2 ABNORMAL DRUG SCREEN: ICD-10-CM

## 2022-10-06 DIAGNOSIS — C34.31 SQUAMOUS CELL CARCINOMA OF BRONCHUS IN RIGHT LOWER LOBE: ICD-10-CM

## 2022-10-06 DIAGNOSIS — Z72.0 TOBACCO ABUSE: ICD-10-CM

## 2022-10-06 PROBLEM — K57.90 DIVERTICULOSIS: Status: ACTIVE | Noted: 2022-06-27

## 2022-10-06 LAB
ALBUMIN SERPL-MCNC: 4.4 G/DL (ref 3.5–5.2)
ALBUMIN/GLOB SERPL: 1.9 G/DL
ALP SERPL-CCNC: 81 U/L (ref 39–117)
ALT SERPL W P-5'-P-CCNC: 12 U/L (ref 1–41)
AMPHET+METHAMPHET UR QL: NEGATIVE
AMPHETAMINE INTERNAL CONTROL: ABNORMAL
AMPHETAMINES UR QL: POSITIVE
ANION GAP SERPL CALCULATED.3IONS-SCNC: 10 MMOL/L (ref 5–15)
AST SERPL-CCNC: 17 U/L (ref 1–40)
BARBITURATE INTERNAL CONTROL: ABNORMAL
BARBITURATES UR QL SCN: NEGATIVE
BASOPHILS # BLD AUTO: 0.06 10*3/MM3 (ref 0–0.2)
BASOPHILS NFR BLD AUTO: 0.5 % (ref 0–1.5)
BENZODIAZ UR QL SCN: NEGATIVE
BENZODIAZEPINE INTERNAL CONTROL: ABNORMAL
BILIRUB SERPL-MCNC: 0.2 MG/DL (ref 0–1.2)
BUN SERPL-MCNC: 11 MG/DL (ref 6–20)
BUN/CREAT SERPL: 11.7 (ref 7–25)
BUPRENORPHINE INTERNAL CONTROL: ABNORMAL
BUPRENORPHINE SERPL-MCNC: NEGATIVE NG/ML
CALCIUM SPEC-SCNC: 9.9 MG/DL (ref 8.6–10.5)
CANNABINOIDS SERPL QL: POSITIVE
CHLORIDE SERPL-SCNC: 106 MMOL/L (ref 98–107)
CHOLEST SERPL-MCNC: 102 MG/DL (ref 0–200)
CO2 SERPL-SCNC: 24 MMOL/L (ref 22–29)
COCAINE INTERNAL CONTROL: ABNORMAL
COCAINE UR QL: NEGATIVE
CREAT SERPL-MCNC: 0.94 MG/DL (ref 0.76–1.27)
DEPRECATED RDW RBC AUTO: 40.9 FL (ref 37–54)
EGFRCR SERPLBLD CKD-EPI 2021: 95.7 ML/MIN/1.73
EOSINOPHIL # BLD AUTO: 0.18 10*3/MM3 (ref 0–0.4)
EOSINOPHIL NFR BLD AUTO: 1.5 % (ref 0.3–6.2)
ERYTHROCYTE [DISTWIDTH] IN BLOOD BY AUTOMATED COUNT: 12.2 % (ref 12.3–15.4)
EXPIRATION DATE: ABNORMAL
GLOBULIN UR ELPH-MCNC: 2.3 GM/DL
GLUCOSE SERPL-MCNC: 75 MG/DL (ref 65–99)
HCT VFR BLD AUTO: 37.1 % (ref 37.5–51)
HDLC SERPL-MCNC: 41 MG/DL (ref 40–60)
HGB BLD-MCNC: 12.7 G/DL (ref 13–17.7)
IMM GRANULOCYTES # BLD AUTO: 0.06 10*3/MM3 (ref 0–0.05)
IMM GRANULOCYTES NFR BLD AUTO: 0.5 % (ref 0–0.5)
LDLC SERPL CALC-MCNC: 47 MG/DL (ref 0–100)
LDLC/HDLC SERPL: 1.18 {RATIO}
LYMPHOCYTES # BLD AUTO: 2.81 10*3/MM3 (ref 0.7–3.1)
LYMPHOCYTES NFR BLD AUTO: 23.8 % (ref 19.6–45.3)
Lab: ABNORMAL
MCH RBC QN AUTO: 31.4 PG (ref 26.6–33)
MCHC RBC AUTO-ENTMCNC: 34.2 G/DL (ref 31.5–35.7)
MCV RBC AUTO: 91.8 FL (ref 79–97)
MDMA (ECSTASY) INTERNAL CONTROL: ABNORMAL
MDMA UR QL SCN: NEGATIVE
METHADONE INTERNAL CONTROL: ABNORMAL
METHADONE UR QL SCN: NEGATIVE
METHAMPHETAMINE INTERNAL CONTROL: ABNORMAL
MONOCYTES # BLD AUTO: 1.03 10*3/MM3 (ref 0.1–0.9)
MONOCYTES NFR BLD AUTO: 8.7 % (ref 5–12)
NEUTROPHILS NFR BLD AUTO: 65 % (ref 42.7–76)
NEUTROPHILS NFR BLD AUTO: 7.65 10*3/MM3 (ref 1.7–7)
NRBC BLD AUTO-RTO: 0 /100 WBC (ref 0–0.2)
OPIATES INTERNAL CONTROL: ABNORMAL
OPIATES UR QL: NEGATIVE
OXYCODONE INTERNAL CONTROL: ABNORMAL
OXYCODONE UR QL SCN: NEGATIVE
PCP UR QL SCN: NEGATIVE
PHENCYCLIDINE INTERNAL CONTROL: ABNORMAL
PLATELET # BLD AUTO: 275 10*3/MM3 (ref 140–450)
PMV BLD AUTO: 11.8 FL (ref 6–12)
POTASSIUM SERPL-SCNC: 4.8 MMOL/L (ref 3.5–5.2)
PROT SERPL-MCNC: 6.7 G/DL (ref 6–8.5)
RBC # BLD AUTO: 4.04 10*6/MM3 (ref 4.14–5.8)
SODIUM SERPL-SCNC: 140 MMOL/L (ref 136–145)
THC INTERNAL CONTROL: ABNORMAL
TRIGL SERPL-MCNC: 64 MG/DL (ref 0–150)
VLDLC SERPL-MCNC: 14 MG/DL (ref 5–40)
WBC NRBC COR # BLD: 11.79 10*3/MM3 (ref 3.4–10.8)

## 2022-10-06 PROCEDURE — 99214 OFFICE O/P EST MOD 30 MIN: CPT | Performed by: FAMILY MEDICINE

## 2022-10-06 PROCEDURE — 85025 COMPLETE CBC W/AUTO DIFF WBC: CPT | Performed by: FAMILY MEDICINE

## 2022-10-06 PROCEDURE — 80305 DRUG TEST PRSMV DIR OPT OBS: CPT | Performed by: FAMILY MEDICINE

## 2022-10-06 PROCEDURE — 80053 COMPREHEN METABOLIC PANEL: CPT | Performed by: FAMILY MEDICINE

## 2022-10-06 PROCEDURE — 80061 LIPID PANEL: CPT | Performed by: FAMILY MEDICINE

## 2022-10-06 RX ORDER — GABAPENTIN 800 MG/1
800 TABLET ORAL 3 TIMES DAILY
Qty: 90 TABLET | Refills: 5 | Status: SHIPPED | OUTPATIENT
Start: 2022-10-06

## 2022-10-06 RX ORDER — PROMETHAZINE HYDROCHLORIDE 25 MG/1
TABLET ORAL
COMMUNITY
Start: 2022-09-21

## 2022-10-06 NOTE — ASSESSMENT & PLAN NOTE
We will update his labs.  Currently he denies any current cardiac symptoms.  Encouraged him once again to quit smoking.

## 2022-10-06 NOTE — ASSESSMENT & PLAN NOTE
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  Currently the gabapentin seems to help with his symptoms.

## 2022-10-06 NOTE — PROGRESS NOTES
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    Lung cancer- he had a previous right lower and middle lobectomy in March 2021.  He then went up to [facility] in [facility] and saw Dr. Heller and had an adjunctive chemotherapy.  He has subsidy completed that course of therapy and now just follows up with the thoracic surgeon Dr. Simpson.    Hypertension- he checks his blood pressure intermittently outside the office.  He states when he does it is typically normal.  It is good here today at 119/67.    Hyperlipidemia- he is taking his rosuvastatin on a daily basis.    Coronary artery disease- he denies any current anginal-like symptoms.    Neuropathy- he states his chemotherapy gave him neuropathy in his feet.  He separately finished that but the neuropathy has persisted.  Currently he is on gabapentin 800 mg 3 times a day.  He states it does help with his neuropathy.      PHQ-2 Depression Screening  Little interest or pleasure in doing things?     Feeling down, depressed, or hopeless?     PHQ-2 Total Score     PHQ-9 Depression Screening  Little interest or pleasure in doing things?     Feeling down, depressed, or hopeless?     Trouble falling or staying asleep, or sleeping too much?     Feeling tired or having little energy?     Poor appetite or overeating?     Feeling bad about yourself - or that you are a failure or have let yourself or your family down?     Trouble concentrating on things, such as reading the newspaper or watching television?     Moving or speaking so slowly that other people could have noticed? Or the opposite - being so fidgety or  restless that you have been moving around a lot more than usual?     Thoughts that you would be better off dead, or of hurting yourself in some way?     PHQ-9 Total Score     If you checked off any problems, how difficult have these problems made it for you to do your work, take care of things at home, or get along with other people?       No Known Allergies    Prior to Admission medications    Medication Sig Start Date End Date Taking? Authorizing Provider   aspirin 81 MG EC tablet Aspir-81 81 mg oral tablet,delayed release (DR/EC) take 1 tablet (81 mg) by oral route once daily   Active   Yes ProviderAsher MD   DULoxetine (CYMBALTA) 30 MG capsule Take 30 mg by mouth every night at bedtime. 5/20/21  Yes Asher Chambers MD   ezetimibe (ZETIA) 10 MG tablet TAKE ONE TABLET BY MOUTH DAILY 3/4/22  Yes Nidhi Ma APRN   HYDROcodone-acetaminophen (NORCO)  MG per tablet Take 1 tablet by mouth 4 (Four) Times a Day As Needed. 2/17/22  Yes Asher Chambers MD   hydrOXYzine (ATARAX) 50 MG tablet Take 1 tablet by mouth 3 (Three) Times a Day As Needed. 6/21/21  Yes Asher Chambers MD   lisinopril (PRINIVIL,ZESTRIL) 10 MG tablet TAKE ONE TABLET BY MOUTH DAILY 9/9/22  Yes Eugenio Cedeño, DO   metoprolol succinate XL (TOPROL-XL) 25 MG 24 hr tablet TAKE 1/2 TABLET BY MOUTH TWICE A DAY 9/9/22  Yes Eugenio Cedeño, DO   naproxen (NAPROSYN) 500 MG tablet TAKE ONE TABLET BY MOUTH TWICE A DAY WITH MEALS 8/8/22  Yes Eugenio Cedeño, DO   nitroglycerin (Nitrostat) 0.4 MG SL tablet Place 1 tablet under the tongue Every 5 (Five) Minutes As Needed for Chest Pain. Take no more than 3 doses in 15 minutes. 2/21/22  Yes Eugenio Cedeño DO   omeprazole (priLOSEC) 40 MG capsule TAKE ONE CAPSULE BY MOUTH DAILY BEFORE A MEAL 3/4/22  Yes Eugenio Cedeño, DO   ondansetron (ZOFRAN) 8 MG tablet TAKE ONE TABLET BY MOUTH EVERY 8 HOURS AS NEEDED FOR NAUSEA 9/21/22  Yes Christine  Clayton HAYDEN MD   prazosin (MINIPRESS) 2 MG capsule  11/7/21  Yes ProviderAsher MD   promethazine (PHENERGAN) 25 MG tablet  9/21/22  Yes ProviderAsher MD   QUEtiapine (SEROquel) 300 MG tablet Take 300 mg by mouth Every Night. 5/17/21  Yes Asher Chambers MD   rosuvastatin (CRESTOR) 40 MG tablet TAKE ONE TABLET BY MOUTH EVERY NIGHT AT BEDTIME 9/9/22  Yes Eugenio Cedeño DO   Symbicort 160-4.5 MCG/ACT inhaler INHALE TWO PUFFS BY MOUTH TWICE A DAY 6/16/22  Yes Priti Polk APRN   docusate sodium 100 MG capsule Take 100 mg by mouth. 9/24/21   Asher Chambers MD    MG capsule  3/30/21   ProviderAsher MD   gabapentin (NEURONTIN) 800 MG tablet  2/2/22   Provider, MD Asher   ipratropium-albuterol (DUO-NEB) 0.5-2.5 mg/3 ml nebulizer INHALE CONTENTS OF 1 VIAL BY NEBULIZATION EVERY 4 HOURS AS NEEDED FOR SHORTNESS OF BREATHE 5/4/21   Provider, MD Asher   nitroglycerin (NITRO-BID) 2.5 MG CR capsule   10/6/22  ProviderAsher MD   ProAir  (90 Base) MCG/ACT inhaler INHALE TWO PUFFS BY MOUTH EVERY 6 HOURS AS NEEDED FOR WHEEZING 9/9/22 10/6/22  Eugenio Cedeño DO   prochlorperazine (COMPAZINE) 10 MG tablet TAKE ONE TABLET BY MOUTH EVERY 6 HOURS AS NEEDED FOR NAUSEA AND VOMITING 9/19/22 10/6/22  Clayton King MD        Patient Active Problem List   Diagnosis   • 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 (HCC)   • Squamous cell carcinoma of lung (HCC)   • Lung nodule   • Tobacco abuse   • Liver lesion   • Anxiety   • Arthritis   • Bipolar 1 disorder, mixed, mild (HCC)   • Cervical spinal stenosis   • Cervical spondylosis with radiculopathy   • Cervicalgia   • Coronary artery disease   • Counseling on substance use and abuse   • Depression   • Essential hypertension   • Hyperlipemia   • Lung cancer (HCC)   • Old myocardial infarction   • Other and unspecified disc disorder of cervical region   • COPD (chronic obstructive pulmonary  disease) (HCC)   • Influenza vaccine refused   • Mental disorder   • Acute pain of left knee   • Diverticulosis   • Neuropathy        Past Surgical History:   Procedure Laterality Date   • LUNG BIOPSY     • LUNG REMOVAL, PARTIAL  03/26/2021    [place]       Social History     Socioeconomic History   • Marital status:    Tobacco Use   • Smoking status: Current Every Day Smoker     Packs/day: 1.50     Years: 40.00     Pack years: 60.00   • Smokeless tobacco: Former User     Types: Chew   • Tobacco comment: 1PPD CURRENTLY   Vaping Use   • Vaping Use: Never used   Substance and Sexual Activity   • Alcohol use: Not Currently   • Drug use: Yes     Comment: PAIN KILLERS       Family History   Adopted: Yes   Problem Relation Age of Onset   • No Known Problems Mother    • No Known Problems Father    • No Known Problems Sister    • No Known Problems Brother    • No Known Problems Maternal Grandmother    • No Known Problems Maternal Grandfather    • No Known Problems Paternal Grandmother    • No Known Problems Paternal Grandfather    • No Known Problems Other        Family history, surgical history, past medical history, Allergies and meds reviewed with patient today and updated in [facility]MR.     ROS:  Review of Systems   Constitutional: Positive for fatigue.   HENT: Negative for congestion, postnasal drip and rhinorrhea.    Eyes: Positive for visual disturbance. Negative for blurred vision.   Respiratory: Negative for cough, chest tightness, shortness of breath and wheezing.    Cardiovascular: Negative for chest pain and palpitations.   Gastrointestinal: Negative for abdominal pain, constipation and diarrhea.   Allergic/Immunologic: Negative for environmental allergies.   Neurological: Positive for numbness. Negative for headache.   Psychiatric/Behavioral: Negative for depressed mood. The patient is not nervous/anxious.        OBJECTIVE:  Vitals:    10/06/22 1435   BP: 119/67   BP Location: Right arm   Patient  "Position: Sitting   Pulse: 97   Temp: 98 °F (36.7 °C)   SpO2: 99%   Weight: 82.3 kg (181 lb 6.4 oz)   Height: 182.9 cm (72\")     No exam data present   Body mass index is 24.6 kg/m².  No LMP for male patient.    Physical Exam  Vitals and nursing note reviewed.   Constitutional:       General: He is not in acute distress.     Appearance: Normal appearance. He is normal weight.   HENT:      Head: Normocephalic.      Right Ear: Tympanic membrane, ear canal and external ear normal.      Left Ear: Tympanic membrane, ear canal and external ear normal.      Nose: Nose normal.      Mouth/Throat:      Mouth: Mucous membranes are moist.      Pharynx: Oropharynx is clear.        Comments: Dentition absent  Eyes:      General: No scleral icterus.     Conjunctiva/sclera: Conjunctivae normal.      Pupils: Pupils are equal, round, and reactive to light.   Cardiovascular:      Rate and Rhythm: Normal rate and regular rhythm.      Pulses: Normal pulses.      Heart sounds: Normal heart sounds. No murmur heard.  Pulmonary:      Effort: Pulmonary effort is normal.      Breath sounds: Normal breath sounds. No wheezing, rhonchi or rales.   Musculoskeletal:      Cervical back: Neck supple. No rigidity or tenderness.   Lymphadenopathy:      Cervical: No cervical adenopathy.   Skin:     General: Skin is warm and dry.      Coloration: Skin is not jaundiced.      Findings: No rash.   Neurological:      General: No focal deficit present.      Mental Status: He is alert and oriented to person, place, and time.   Psychiatric:         Mood and Affect: Mood normal.         Thought Content: Thought content normal.         Judgment: Judgment normal.         Procedures    No visits with results within 30 Day(s) from this visit.   Latest known visit with results is:   Hospital Outpatient Visit on 07/07/2022   Component Date Value Ref Range Status   • Creatinine 07/07/2022 0.90  mg/dL Final    Serial Number: [id number]:  [id number]   • eGFR 07/07/2022 " 100.9  >60.0 mL/min/1.73 Final       ASSESSMENT/ PLAN:    Diagnoses and all orders for this visit:    1. Coronary artery disease involving native coronary artery of native heart without angina pectoris (Primary)  Assessment & Plan:  We will update his labs.  Currently he denies any current cardiac symptoms.  Encouraged him once again to quit smoking.    Orders:  -     Lipid Panel    2. Essential hypertension  Assessment & Plan:  His blood pressure is good here as well as outside the office.  We will continue his current meds and update his labs.    Orders:  -     Comprehensive Metabolic Panel  -     Lipid Panel    3. Hyperlipidemia, unspecified hyperlipidemia type  Assessment & Plan:  Update his lipid profile with his labs here today.    Orders:  -     Comprehensive Metabolic Panel  -     Lipid Panel    4. 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 (HCC)  Assessment & Plan:  His initial cancer was resected surgically by Dr. Simpson.  And then he had adjunctive chemotherapy.  He now sees Dr. Simpson every 3 months.    Orders:  -     CBC Auto Differential    5. Tobacco abuse  Assessment & Plan:  Despite his diagnosis of lung cancer 18 months ago he continues to smoke.  Currently smoking about a pack per day.  I strongly advised him to quit smoking with his underlying history of lung cancer chronic lung disease and coronary artery disease.      6. Chronic obstructive pulmonary disease, unspecified COPD type (HCC)    7. Neuropathy  Assessment & Plan:  He has persistent neuropathy of his lower legs and feet due to his chemotherapy.  Currently the gabapentin seems to help with his symptoms.    Orders:  -     POC Urine Drug Screen [facility]  -     gabapentin (NEURONTIN) 800 MG tablet; Take 1 tablet by mouth 3 (Three) Times a Day.  Dispense: 90 tablet; Refill: 5      Orders Placed Today:     New Medications Ordered This Visit   Medications   • gabapentin (NEURONTIN) 800 MG tablet     Sig: Take 1 tablet by  mouth 3 (Three) Times a Day.     Dispense:  90 tablet     Refill:  5        Management Plan:     An After Visit Summary was printed and given to the patient at discharge.    Follow-up: Return in about 6 months (around 4/6/2023) for Recheck.    Eugenio Cedeño,  10/6/2022 15:30 EDT  This note was electronically signed.  Answers for HPI/ROS submitted by the patient on 10/1/2022  What is the primary reason for your visit?: Other  Please describe your symptoms.: Medication refills, Neuropathy in feet. Was put on Gabapentin 600mg by DR.G. Cedeño, But later raised to 800mg by DR. Bhavik Heller at [facility] in [facility] Because the 600mg wasnt working.  With what im told since i finished chemotherapy im no longer a patient or been seen their. I was told to have my P.C.P to take over. My feet hurt all the time and burn.  Have you had these symptoms before?: Yes  How long have you been having these symptoms?: Greater than 2 weeks  Please list any medications you are currently taking for this condition.: Gabapentin 800mg.  Please describe any probable cause for these symptoms. : Feet hurt and burns all the time and makes it hard to stand on them.

## 2022-10-06 NOTE — ASSESSMENT & PLAN NOTE
His initial cancer was resected surgically by Dr. Simpson.  And then he had adjunctive chemotherapy.  He now sees Dr. Simpson every 3 months.

## 2022-10-06 NOTE — ASSESSMENT & PLAN NOTE
Despite his diagnosis of lung cancer 18 months ago he continues to smoke.  Currently smoking about a pack per day.  I strongly advised him to quit smoking with his underlying history of lung cancer chronic lung disease and coronary artery disease.

## 2022-10-07 DIAGNOSIS — D72.9 ABNORMAL WHITE BLOOD CELL (WBC) COUNT: Primary | ICD-10-CM

## 2022-10-10 RX ORDER — OMEPRAZOLE 40 MG/1
40 CAPSULE, DELAYED RELEASE ORAL
Qty: 90 CAPSULE | Refills: 3 | Status: SHIPPED | OUTPATIENT
Start: 2022-10-10

## 2022-10-11 ENCOUNTER — TELEPHONE (OUTPATIENT)
Dept: FAMILY MEDICINE CLINIC | Facility: CLINIC | Age: 57
End: 2022-10-11

## 2022-10-11 DIAGNOSIS — R11.0 NAUSEA: ICD-10-CM

## 2022-10-11 RX ORDER — DULOXETIN HYDROCHLORIDE 30 MG/1
30 CAPSULE, DELAYED RELEASE ORAL
Qty: 90 CAPSULE | Refills: 1 | Status: SHIPPED | OUTPATIENT
Start: 2022-10-11

## 2022-10-11 RX ORDER — HYDROXYZINE 50 MG/1
50 TABLET, FILM COATED ORAL 3 TIMES DAILY PRN
Qty: 90 TABLET | Refills: 1 | Status: SHIPPED | OUTPATIENT
Start: 2022-10-11

## 2022-10-11 RX ORDER — METOPROLOL SUCCINATE 25 MG/1
12.5 TABLET, EXTENDED RELEASE ORAL 2 TIMES DAILY
Qty: 180 TABLET | Refills: 1 | Status: SHIPPED | OUTPATIENT
Start: 2022-10-11

## 2022-10-11 RX ORDER — ROSUVASTATIN CALCIUM 40 MG/1
40 TABLET, COATED ORAL
Qty: 90 TABLET | Refills: 1 | Status: SHIPPED | OUTPATIENT
Start: 2022-10-11

## 2022-10-11 RX ORDER — LISINOPRIL 10 MG/1
10 TABLET ORAL DAILY
Qty: 90 TABLET | Refills: 1 | Status: SHIPPED | OUTPATIENT
Start: 2022-10-11

## 2022-10-11 RX ORDER — NAPROXEN 500 MG/1
TABLET ORAL
Qty: 60 TABLET | Refills: 1 | OUTPATIENT
Start: 2022-10-11

## 2022-10-11 RX ORDER — NAPROXEN 500 MG/1
500 TABLET ORAL 2 TIMES DAILY WITH MEALS
Qty: 60 TABLET | Refills: 5 | Status: SHIPPED | OUTPATIENT
Start: 2022-10-11

## 2022-10-11 RX ORDER — FAMOTIDINE 20 MG/1
20 TABLET, FILM COATED ORAL 2 TIMES DAILY
Qty: 180 TABLET | Refills: 1 | Status: SHIPPED | OUTPATIENT
Start: 2022-10-11 | End: 2023-10-11

## 2022-10-11 RX ORDER — ONDANSETRON HYDROCHLORIDE 8 MG/1
8 TABLET, FILM COATED ORAL EVERY 8 HOURS PRN
Qty: 30 TABLET | Refills: 3 | Status: SHIPPED | OUTPATIENT
Start: 2022-10-11 | End: 2023-01-04

## 2022-10-11 NOTE — TELEPHONE ENCOUNTER
This is from the UDS that was sent out for confirmation. Would you like patient to come back in to redo this UDS?

## 2022-10-13 ENCOUNTER — OFFICE VISIT (OUTPATIENT)
Dept: PULMONOLOGY | Facility: CLINIC | Age: 57
End: 2022-10-13

## 2022-10-13 VITALS
TEMPERATURE: 98.6 F | SYSTOLIC BLOOD PRESSURE: 115 MMHG | RESPIRATION RATE: 20 BRPM | WEIGHT: 182.9 LBS | HEIGHT: 72 IN | OXYGEN SATURATION: 82 % | BODY MASS INDEX: 24.77 KG/M2 | DIASTOLIC BLOOD PRESSURE: 79 MMHG | HEART RATE: 70 BPM

## 2022-10-13 DIAGNOSIS — J44.9 CHRONIC OBSTRUCTIVE PULMONARY DISEASE, UNSPECIFIED COPD TYPE: ICD-10-CM

## 2022-10-13 DIAGNOSIS — Z72.0 TOBACCO ABUSE: ICD-10-CM

## 2022-10-13 DIAGNOSIS — C34.31 SQUAMOUS CELL CARCINOMA OF BRONCHUS IN RIGHT LOWER LOBE: Primary | ICD-10-CM

## 2022-10-13 DIAGNOSIS — Z23 ENCOUNTER FOR IMMUNIZATION: ICD-10-CM

## 2022-10-13 DIAGNOSIS — R91.1 LUNG NODULE: ICD-10-CM

## 2022-10-13 DIAGNOSIS — Z23 FLU VACCINE NEED: ICD-10-CM

## 2022-10-13 DIAGNOSIS — C34.90 SQUAMOUS CELL CARCINOMA OF LUNG, UNSPECIFIED LATERALITY: ICD-10-CM

## 2022-10-13 PROCEDURE — 99214 OFFICE O/P EST MOD 30 MIN: CPT | Performed by: NURSE PRACTITIONER

## 2022-10-13 PROCEDURE — 99406 BEHAV CHNG SMOKING 3-10 MIN: CPT | Performed by: NURSE PRACTITIONER

## 2022-10-13 PROCEDURE — 90686 IIV4 VACC NO PRSV 0.5 ML IM: CPT | Performed by: NURSE PRACTITIONER

## 2022-10-13 PROCEDURE — 90471 IMMUNIZATION ADMIN: CPT | Performed by: NURSE PRACTITIONER

## 2022-10-13 RX ORDER — IPRATROPIUM BROMIDE AND ALBUTEROL SULFATE 2.5; .5 MG/3ML; MG/3ML
3 SOLUTION RESPIRATORY (INHALATION) EVERY 4 HOURS PRN
Qty: 360 ML | Refills: 11 | Status: SHIPPED | OUTPATIENT
Start: 2022-10-13 | End: 2023-03-02 | Stop reason: SDUPTHER

## 2022-10-13 RX ORDER — ALBUTEROL SULFATE 90 UG/1
2 AEROSOL, METERED RESPIRATORY (INHALATION) EVERY 4 HOURS PRN
COMMUNITY
End: 2022-10-13 | Stop reason: SDUPTHER

## 2022-10-13 RX ORDER — NICOTINE 21 MG/24HR
1 PATCH, TRANSDERMAL 24 HOURS TRANSDERMAL EVERY 24 HOURS
Qty: 30 PATCH | Refills: 0 | Status: SHIPPED | OUTPATIENT
Start: 2022-10-13

## 2022-10-13 RX ORDER — POLYETHYLENE GLYCOL 3350 17 G
2 POWDER IN PACKET (EA) ORAL AS NEEDED
Qty: 120 EACH | Refills: 11 | Status: SHIPPED | OUTPATIENT
Start: 2022-10-13 | End: 2022-11-12

## 2022-10-13 RX ORDER — PROCHLORPERAZINE MALEATE 10 MG
TABLET ORAL
COMMUNITY
Start: 2022-10-09

## 2022-10-13 RX ORDER — ALBUTEROL SULFATE 90 UG/1
2 AEROSOL, METERED RESPIRATORY (INHALATION) EVERY 4 HOURS PRN
Qty: 18 G | Refills: 11 | Status: SHIPPED | OUTPATIENT
Start: 2022-10-13 | End: 2022-11-12

## 2022-10-13 RX ORDER — BUDESONIDE AND FORMOTEROL FUMARATE DIHYDRATE 160; 4.5 UG/1; UG/1
2 AEROSOL RESPIRATORY (INHALATION) 2 TIMES DAILY
Qty: 1 EACH | Refills: 11 | Status: SHIPPED | OUTPATIENT
Start: 2022-10-13 | End: 2023-03-02 | Stop reason: SDUPTHER

## 2022-10-13 RX ORDER — IPRATROPIUM BROMIDE AND ALBUTEROL SULFATE 2.5; .5 MG/3ML; MG/3ML
3 SOLUTION RESPIRATORY (INHALATION) EVERY 4 HOURS PRN
COMMUNITY
End: 2022-10-13 | Stop reason: SDUPTHER

## 2022-10-13 RX ORDER — VENLAFAXINE HYDROCHLORIDE 37.5 MG/1
CAPSULE, EXTENDED RELEASE ORAL
COMMUNITY
Start: 2022-10-12

## 2022-10-21 ENCOUNTER — TELEPHONE (OUTPATIENT)
Dept: PULMONOLOGY | Facility: CLINIC | Age: 57
End: 2022-10-21

## 2022-10-27 ENCOUNTER — TELEPHONE (OUTPATIENT)
Dept: FAMILY MEDICINE CLINIC | Facility: CLINIC | Age: 57
End: 2022-10-27

## 2022-10-27 RX ORDER — AZITHROMYCIN 250 MG/1
TABLET, FILM COATED ORAL
Qty: 6 TABLET | Refills: 0 | Status: SHIPPED | OUTPATIENT
Start: 2022-10-27

## 2022-10-27 RX ORDER — METHYLPREDNISOLONE 4 MG/1
TABLET ORAL
Qty: 21 TABLET | Refills: 0 | Status: SHIPPED | OUTPATIENT
Start: 2022-10-27

## 2022-10-27 NOTE — TELEPHONE ENCOUNTER
Pt called office and states he wants to have an antiobiotic sent in. States his O2 is ranging from 78-96 and he has not had a fever greater or equal to 100.5. Please advise.

## 2022-11-21 ENCOUNTER — HOSPITAL ENCOUNTER (EMERGENCY)
Facility: HOSPITAL | Age: 57
Discharge: LEFT BEFORE DISCHARGE | End: 2022-11-21
Attending: EMERGENCY MEDICINE | Admitting: EMERGENCY MEDICINE

## 2022-11-21 ENCOUNTER — APPOINTMENT (OUTPATIENT)
Dept: GENERAL RADIOLOGY | Facility: HOSPITAL | Age: 57
End: 2022-11-21

## 2022-11-21 VITALS
SYSTOLIC BLOOD PRESSURE: 105 MMHG | OXYGEN SATURATION: 100 % | BODY MASS INDEX: 23.71 KG/M2 | WEIGHT: 175.04 LBS | TEMPERATURE: 98 F | HEIGHT: 72 IN | HEART RATE: 100 BPM | RESPIRATION RATE: 20 BRPM | DIASTOLIC BLOOD PRESSURE: 48 MMHG

## 2022-11-21 DIAGNOSIS — R07.9 NONSPECIFIC CHEST PAIN: ICD-10-CM

## 2022-11-21 DIAGNOSIS — R06.02 SHORTNESS OF BREATH: Primary | ICD-10-CM

## 2022-11-21 LAB
ALBUMIN SERPL-MCNC: 3.8 G/DL (ref 3.5–5.2)
ALBUMIN/GLOB SERPL: 1.2 G/DL
ALP SERPL-CCNC: 95 U/L (ref 39–117)
ALT SERPL W P-5'-P-CCNC: 9 U/L (ref 1–41)
ANION GAP SERPL CALCULATED.3IONS-SCNC: 9.8 MMOL/L (ref 5–15)
AST SERPL-CCNC: 13 U/L (ref 1–40)
BASOPHILS # BLD AUTO: 0.03 10*3/MM3 (ref 0–0.2)
BASOPHILS NFR BLD AUTO: 0.2 % (ref 0–1.5)
BILIRUB SERPL-MCNC: 0.2 MG/DL (ref 0–1.2)
BUN SERPL-MCNC: 11 MG/DL (ref 6–20)
BUN/CREAT SERPL: 12.4 (ref 7–25)
CALCIUM SPEC-SCNC: 9.5 MG/DL (ref 8.6–10.5)
CHLORIDE SERPL-SCNC: 103 MMOL/L (ref 98–107)
CO2 SERPL-SCNC: 27.2 MMOL/L (ref 22–29)
CREAT SERPL-MCNC: 0.89 MG/DL (ref 0.76–1.27)
DEPRECATED RDW RBC AUTO: 48 FL (ref 37–54)
EGFRCR SERPLBLD CKD-EPI 2021: 101.2 ML/MIN/1.73
EOSINOPHIL # BLD AUTO: 0.11 10*3/MM3 (ref 0–0.4)
EOSINOPHIL NFR BLD AUTO: 0.9 % (ref 0.3–6.2)
ERYTHROCYTE [DISTWIDTH] IN BLOOD BY AUTOMATED COUNT: 14.1 % (ref 12.3–15.4)
GLOBULIN UR ELPH-MCNC: 3.3 GM/DL
GLUCOSE SERPL-MCNC: 73 MG/DL (ref 65–99)
HCT VFR BLD AUTO: 38.6 % (ref 37.5–51)
HGB BLD-MCNC: 13.1 G/DL (ref 13–17.7)
HOLD SPECIMEN: NORMAL
HOLD SPECIMEN: NORMAL
IMM GRANULOCYTES # BLD AUTO: 0.06 10*3/MM3 (ref 0–0.05)
IMM GRANULOCYTES NFR BLD AUTO: 0.5 % (ref 0–0.5)
LYMPHOCYTES # BLD AUTO: 2.17 10*3/MM3 (ref 0.7–3.1)
LYMPHOCYTES NFR BLD AUTO: 17.7 % (ref 19.6–45.3)
MCH RBC QN AUTO: 31.3 PG (ref 26.6–33)
MCHC RBC AUTO-ENTMCNC: 33.9 G/DL (ref 31.5–35.7)
MCV RBC AUTO: 92.3 FL (ref 79–97)
MONOCYTES # BLD AUTO: 1.54 10*3/MM3 (ref 0.1–0.9)
MONOCYTES NFR BLD AUTO: 12.6 % (ref 5–12)
NEUTROPHILS NFR BLD AUTO: 68.1 % (ref 42.7–76)
NEUTROPHILS NFR BLD AUTO: 8.34 10*3/MM3 (ref 1.7–7)
NRBC BLD AUTO-RTO: 0 /100 WBC (ref 0–0.2)
NT-PROBNP SERPL-MCNC: 56.2 PG/ML (ref 0–900)
PLATELET # BLD AUTO: 234 10*3/MM3 (ref 140–450)
PMV BLD AUTO: 11.1 FL (ref 6–12)
POTASSIUM SERPL-SCNC: 4 MMOL/L (ref 3.5–5.2)
PROT SERPL-MCNC: 7.1 G/DL (ref 6–8.5)
QT INTERVAL: 351 MS
RBC # BLD AUTO: 4.18 10*6/MM3 (ref 4.14–5.8)
SODIUM SERPL-SCNC: 140 MMOL/L (ref 136–145)
TROPONIN T SERPL-MCNC: <0.01 NG/ML (ref 0–0.03)
WBC NRBC COR # BLD: 12.25 10*3/MM3 (ref 3.4–10.8)
WHOLE BLOOD HOLD COAG: NORMAL
WHOLE BLOOD HOLD SPECIMEN: NORMAL

## 2022-11-21 PROCEDURE — 93005 ELECTROCARDIOGRAM TRACING: CPT

## 2022-11-21 PROCEDURE — 99282 EMERGENCY DEPT VISIT SF MDM: CPT

## 2022-11-21 PROCEDURE — 36415 COLL VENOUS BLD VENIPUNCTURE: CPT

## 2022-11-21 PROCEDURE — 93005 ELECTROCARDIOGRAM TRACING: CPT | Performed by: EMERGENCY MEDICINE

## 2022-11-21 PROCEDURE — 84484 ASSAY OF TROPONIN QUANT: CPT

## 2022-11-21 PROCEDURE — 85025 COMPLETE CBC W/AUTO DIFF WBC: CPT

## 2022-11-21 PROCEDURE — 71045 X-RAY EXAM CHEST 1 VIEW: CPT

## 2022-11-21 PROCEDURE — 80053 COMPREHEN METABOLIC PANEL: CPT

## 2022-11-21 PROCEDURE — 83880 ASSAY OF NATRIURETIC PEPTIDE: CPT

## 2022-11-21 RX ORDER — SODIUM CHLORIDE 0.9 % (FLUSH) 0.9 %
10 SYRINGE (ML) INJECTION AS NEEDED
Status: DISCONTINUED | OUTPATIENT
Start: 2022-11-21 | End: 2022-11-21 | Stop reason: HOSPADM

## 2022-11-21 NOTE — ED PROVIDER NOTES
Time: 3:50 PM EST  Chief Complaint:   Chief Complaint   Patient presents with   • Shortness of Breath   • Chest Pain           History of Present Illness:  Patient is a 57 y.o. year old male who presents to the emergency department with right lateral rib and chest pain and left lateral rib and chest pain.  He denies any known injury.  He rates his pain today a 10 out of 10.  He reports chronic right chest and rib pain due to a lobectomy related to cancer and was supposed to have a CT scan done today of his chest but did not make the appointment. Patient states the lobectomy was completed during March of 2021.  Patient states he has been monitoring his oxygen at home and states it ranges from 72 to 99%.  Patient states he does have oxygen to use as needed at home patient is using 2 L when needing oxygen.  Patient does also admit to intermittent cough.  Patient states he has been having the above symptoms for approximately 3 weeks but they do not seem to be improving.  Patient does state he recently completed a round of antibiotics but cannot specify when.  He denies any other complaints today.            History provided by:  Patient and spouse   used: No    Shortness of Breath  Severity:  Mild  Onset quality:  Gradual  Duration: 3 weeks.  Timing:  Intermittent  Progression:  Waxing and waning  Associated symptoms: chest pain and cough    Associated symptoms: no abdominal pain, no ear pain, no fever, no headaches, no rash, no sputum production, no syncope, no vomiting and no wheezing    Chest Pain  Pain location:  R lateral chest and L lateral chest  Pain quality: aching    Pain radiates to:  Does not radiate  Pain severity:  Mild  Duration: 3 weeks.  Timing:  Intermittent  Chronicity:  New  Context comment:  Began after lobectomy last year, but seems to have worsened over the last 3 weeks.  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 (HCC) 6/7/2021     Past Surgical History:   Procedure Laterality Date   • LUNG BIOPSY     • LUNG REMOVAL, PARTIAL  03/26/2021    [place]     Family History   Adopted: Yes   Problem Relation Age of Onset   • No Known Problems Mother    • No Known Problems Father    • No Known Problems Sister    • No Known Problems Brother    • No Known Problems Maternal Grandmother    • No Known Problems Maternal Grandfather    • No Known Problems Paternal Grandmother    • No Known Problems Paternal Grandfather    • No Known Problems Other        Home Medications:  Prior to Admission medications    Medication Sig Start Date End Date Taking? Authorizing Provider   aspirin 81 MG EC tablet Aspir-81 81 mg oral tablet,delayed release (DR/EC) take 1 tablet (81 mg) by oral route once daily   Active    Provider, MD Asher   azithromycin (Zithromax Z-Amaury) 250 MG tablet Take 2 tablets by mouth on day 1, then 1 tablet daily on days 2-5 10/27/22   Eugenio Cedeño DO   budesonide-formoterol (Symbicort) 160-4.5 MCG/ACT inhaler Inhale 2 puffs 2 (Two) Times a Day for 30 days. Rinse mouth out after each use 10/13/22 11/12/22  Jannet Headley APRN   DULoxetine (CYMBALTA) 30 MG capsule Take 1 capsule by mouth every night at bedtime. 10/11/22   Eugenio Cedeño DO   ezetimibe (ZETIA) 10 MG tablet TAKE ONE TABLET BY MOUTH DAILY 3/4/22   Nidhi Ma APRN   famotidine (PEPCID) 20 MG  tablet Take 1 tablet by mouth 2 (Two) Times a Day. 10/11/22 10/11/23  Eugenio Cedeño DO   gabapentin (NEURONTIN) 800 MG tablet Take 1 tablet by mouth 3 (Three) Times a Day. 10/6/22   Eugenio Cedeño DO   HYDROcodone-acetaminophen (NORCO)  MG per tablet Take 1 tablet by mouth 4 (Four) Times a Day As Needed. 2/17/22   Provider, MD Asher   hydrOXYzine (ATARAX) 50 MG tablet Take 1 tablet by mouth 3 (Three) Times a Day As Needed for Anxiety. 10/11/22   Eugenio Cedeño DO   ipratropium-albuterol (DUO-NEB) 0.5-2.5 mg/3 ml nebulizer Take 3 mL by nebulization Every 4 (Four) Hours As Needed for Wheezing or Shortness of Air for up to 30 days. 10/13/22 11/12/22  Jannet Headley APRN   lisinopril (PRINIVIL,ZESTRIL) 10 MG tablet Take 1 tablet by mouth Daily. 10/11/22   Eugenio Cedeño DO   methylPREDNISolone (MEDROL) 4 MG dose pack Take as directed on package instructions. 10/27/22   Eugenio Cedeño DO   metoprolol succinate XL (TOPROL-XL) 25 MG 24 hr tablet Take 0.5 tablets by mouth 2 (Two) Times a Day. 10/11/22   Eugenio Cedeño DO   naproxen (NAPROSYN) 500 MG tablet Take 1 tablet by mouth 2 (Two) Times a Day With Meals. 10/11/22   Eugenio Cedeño DO   nicotine (NICODERM CQ) 21 MG/24HR patch Place 1 patch on the skin as directed by provider Daily. 10/13/22   Jannet Headley APRN   nitroglycerin (Nitrostat) 0.4 MG SL tablet Place 1 tablet under the tongue Every 5 (Five) Minutes As Needed for Chest Pain. Take no more than 3 doses in 15 minutes. 2/21/22   Eugenio Cedeño DO   omeprazole (priLOSEC) 40 MG capsule Take 1 capsule by mouth Every Morning Before Breakfast. 10/10/22   Eugenio Cedeño DO   ondansetron (ZOFRAN) 8 MG tablet Take 1 tablet by mouth Every 8 (Eight) Hours As Needed for Nausea. for nausea 10/11/22   Clayton King MD   prazosin (MINIPRESS) 2 MG capsule  11/7/21   Provider, Historical, MD   prochlorperazine  "(COMPAZINE) 10 MG tablet TAKE 1 TABLET BY MOUTH EVERY 6 HOURS AS NEEDED FORNAUSEA AND VOMITING 10/9/22   Asher Chambers MD   promethazine (PHENERGAN) 25 MG tablet  9/21/22   Asher Chambers MD   QUEtiapine (SEROquel) 300 MG tablet Take 300 mg by mouth Every Night. 5/17/21   Asher Chambers MD   rosuvastatin (CRESTOR) 40 MG tablet Take 1 tablet by mouth every night at bedtime. 10/11/22   Eugenio Cedeño,    tiotropium bromide monohydrate (SPIRIVA RESPIMAT) 2.5 MCG/ACT aerosol solution inhaler Inhale 2 puffs Daily for 30 days. 10/13/22 11/12/22  Jannet Headley APRN   venlafaxine XR (EFFEXOR-XR) 37.5 MG 24 hr capsule  10/12/22   Provider, MD Asher        Social History:   Social History     Tobacco Use   • Smoking status: Every Day     Packs/day: 1.00     Years: 40.00     Pack years: 40.00     Types: Cigarettes   • Smokeless tobacco: Former     Types: Chew   • Tobacco comments:     1PPD CURRENTLY   Vaping Use   • Vaping Use: Never used   Substance Use Topics   • Alcohol use: Not Currently   • Drug use: Yes     Comment: PAIN KILLERS         Review of Systems:  Review of Systems   Constitutional: Negative for chills and fever.   HENT: Negative for congestion and ear pain.    Eyes: Negative for pain.   Respiratory: Positive for cough, chest tightness and shortness of breath. Negative for sputum production and wheezing.    Cardiovascular: Positive for chest pain. Negative for syncope.   Gastrointestinal: Negative for abdominal pain, diarrhea, nausea and vomiting.   Genitourinary: Negative for dysuria and hematuria.   Musculoskeletal: Negative for myalgias.   Skin: Negative for rash.   Neurological: Negative for dizziness and headaches.   All other systems reviewed and are negative.       Physical Exam:  /48 (BP Location: Right arm, Patient Position: Sitting)   Pulse 100   Temp 98 °F (36.7 °C) (Oral)   Resp 20   Ht 182.9 cm (72\")   Wt 79.4 kg (175 lb 0.7 oz)   SpO2 100%   " BMI 23.74 kg/m²     Physical Exam  Vitals and nursing note reviewed.   Constitutional:       General: He is not in acute distress.     Appearance: Normal appearance. He is well-developed and normal weight. He is not ill-appearing, toxic-appearing or diaphoretic.   HENT:      Head: Normocephalic.   Eyes:      Extraocular Movements: Extraocular movements intact.      Conjunctiva/sclera: Conjunctivae normal.      Pupils: Pupils are equal, round, and reactive to light.   Cardiovascular:      Rate and Rhythm: Normal rate and regular rhythm.      Heart sounds: Normal heart sounds.   Pulmonary:      Effort: Pulmonary effort is normal.      Breath sounds: Normal breath sounds.   Chest:      Chest wall: Tenderness present. No mass, swelling or edema.       Abdominal:      General: There is no distension.      Palpations: Abdomen is soft. There is no hepatomegaly, splenomegaly or mass.      Tenderness: There is no abdominal tenderness. There is no guarding.   Musculoskeletal:      Right lower leg: No tenderness. No edema.      Left lower leg: No tenderness. No edema.   Skin:     General: Skin is warm.      Coloration: Skin is not cyanotic.   Neurological:      General: No focal deficit present.      Mental Status: He is alert and oriented to person, place, and time.   Psychiatric:         Attention and Perception: Attention and perception normal.         Mood and Affect: Mood normal.         Behavior: Behavior normal.                Medications in the Emergency Department:  Medications   sodium chloride 0.9 % flush 10 mL (has no administration in time range)        Labs  Lab Results (last 24 hours)     Procedure Component Value Units Date/Time    CBC & Differential [[id number]]  (Abnormal) Collected: 11/21/22 1556    Specimen: Blood Updated: 11/21/22 1614    Narrative:      The following orders were created for panel order CBC & Differential.  Procedure                               Abnormality         Status                      ---------                               -----------         ------                     CBC Auto Differential[[id number]]        Abnormal            Final result                 Please view results for these tests on the individual orders.    Comprehensive Metabolic Panel [[id number]] Collected: 11/21/22 1556    Specimen: Blood Updated: 11/21/22 1638     Glucose 73 mg/dL      BUN 11 mg/dL      Creatinine 0.89 mg/dL      Sodium 140 mmol/L      Potassium 4.0 mmol/L      Chloride 103 mmol/L      CO2 27.2 mmol/L      Calcium 9.5 mg/dL      Total Protein 7.1 g/dL      Albumin 3.80 g/dL      ALT (SGPT) 9 U/L      AST (SGOT) 13 U/L      Alkaline Phosphatase 95 U/L      Total Bilirubin 0.2 mg/dL      Globulin 3.3 gm/dL      A/G Ratio 1.2 g/dL      BUN/Creatinine Ratio 12.4     Anion Gap 9.8 mmol/L      eGFR 101.2 mL/min/1.73      Comment: National Kidney Foundation and American Society of Nephrology (ASN) Task Force recommended calculation based on the Chronic Kidney Disease Epidemiology Collaboration (CKD-EPI) equation refit without adjustment for race.       Narrative:      GFR Normal >60  Chronic Kidney Disease <60  Kidney Failure <15      BNP [[id number]]  (Normal) Collected: 11/21/22 1556    Specimen: Blood Updated: 11/21/22 1634     proBNP 56.2 pg/mL     Narrative:      Among patients with dyspnea, NT-proBNP is highly sensitive for the detection of acute congestive heart failure. In addition NT-proBNP of <300 pg/ml effectively rules out acute congestive heart failure with 99% negative predictive value.      Troponin [[id number]]  (Normal) Collected: 11/21/22 1556    Specimen: Blood Updated: 11/21/22 1638     Troponin T <0.010 ng/mL     Narrative:      Troponin T Reference Range:  <= 0.03 ng/mL-   Negative for AMI  >0.03 ng/mL-     Abnormal for myocardial necrosis.  Clinicians would have to utilize clinical acumen, EKG, Troponin and serial changes to determine if it is an Acute Myocardial Infarction or myocardial injury  due to an underlying chronic condition.       Results may be falsely decreased if patient taking Biotin.      CBC Auto Differential [[id number]]  (Abnormal) Collected: 11/21/22 1556    Specimen: Blood Updated: 11/21/22 1614     WBC 12.25 10*3/mm3      RBC 4.18 10*6/mm3      Hemoglobin 13.1 g/dL      Hematocrit 38.6 %      MCV 92.3 fL      MCH 31.3 pg      MCHC 33.9 g/dL      RDW 14.1 %      RDW-SD 48.0 fl      MPV 11.1 fL      Platelets 234 10*3/mm3      Neutrophil % 68.1 %      Lymphocyte % 17.7 %      Monocyte % 12.6 %      Eosinophil % 0.9 %      Basophil % 0.2 %      Immature Grans % 0.5 %      Neutrophils, Absolute 8.34 10*3/mm3      Lymphocytes, Absolute 2.17 10*3/mm3      Monocytes, Absolute 1.54 10*3/mm3      Eosinophils, Absolute 0.11 10*3/mm3      Basophils, Absolute 0.03 10*3/mm3      Immature Grans, Absolute 0.06 10*3/mm3      nRBC 0.0 /100 WBC            Imaging:  XR Chest 1 View    Result Date: 11/21/2022  PROCEDURE: XR CHEST 1 VW  COMPARISON: [facility], CR, CHEST AP/PA 1 VIEW, 2/02/2021, 22:46.  [facility], CT, CT CHEST WO CONTRAST DIAGNOSTIC, 3/01/2022, 9:41.  [facility], CT, CT CHEST W CONTRAST DIAGNOSTIC, 7/07/2022, 9:56.  [facility], CR, CHEST PA/AP & LAT 2V, 4/06/2021, 13:39.  INDICATIONS: SOA Triage Protocol  FINDINGS:  The moderate pleural effusion previously noted has resolved.  Postsurgical changes are noted in the right lung base.  Near the right lung apex is a 0.9 cm irregular density suspected to represent scarring or atelectasis.  No acute infiltrate or effusion is seen.  The cardiac and mediastinal silhouettes appear within normal limits.        1. Postsurgical changes right lung field 2. No acute infiltrate or effusion 3. 0.9 cm irregular density near the right lung apex suspected to represent scarring or atelectasis       Darwin Felipe M.D.       Electronically Signed and Approved By: Darwin Felipe M.D. on 11/21/2022 at 17:55                Procedures:  Procedures    Progress  ED Course as of 11/21/22 1906 Mon Nov 21, 2022   1547 --- PROVIDER IN TRIAGE NOTE ---    The patient was seen and evaluated by me Jelly Huerta in triage. Orders were placed and the patient is currently awaiting disposition. [KS]   1826 Patient care tech reported to me that on standing patient O2 saturation initally was 85% but when began walking O2 saturation remained at 100%. Note that while I completed my initial examination patient O2 remained at 100% [MD]      ED Course User Index  [KS] Jelly Huerta APRN  [MD] Zoran Phillip PA-C                         HEART Score (for prediction of 6-week risk of major adverse cardiac event) reviewed and/or performed as part of the patient evaluation and treatment planning process.  The result associated with this review/performance is: 3        The patient was initially evaluated in the triage area where orders were placed. The patient was later dispositioned by Zoran Phillip PA-C.      The patient was advised to stay for completion of workup which includes but is not limited to communication of labs and radiological results, reassessment and plan. The patient was advised that leaving prior to disposition by a provider could result in critical findings that are not communicated to the patient.     Medical Decision Making:  MDM  Number of Diagnoses or Management Options  Diagnosis management comments: CBC noted for white blood cell count 5.25.  CMP unremarkable, BMP unremarkable, troponin unremarkable, EKG unremarkable.  Chest x-ray is noted to have atelectasis noted of the right upper lobe and changes from right bilobectomy of the middle and lower lobe.  Patient ambulating oxygen saturation 100%. I have spoken with patient. I have explained the patient´s condition, diagnoses and treatment plan based on the information available to me at this time. I have answered the patient's questions and addressed any  concerns. The patient has a good  understanding of the patient´s diagnosis, condition, and treatment plan as can be expected at this point. The vital signs have been stable. The patient´s condition is stable and appropriate for discharge from the emergency department.      The patient will pursue further outpatient evaluation with the primary care physician or other designated or consulting physician as outlined in the discharge instructions. They are agreeable to this plan of care and follow-up instructions have been explained in detail. The patient has received these instructions in written format and have expressed an understanding of the discharge instructions. The patient is aware that any significant change in condition or worsening of symptoms should prompt an immediate return to this or the closest emergency department or call to 911.       Amount and/or Complexity of Data Reviewed  Clinical lab tests: reviewed and ordered  Tests in the radiology section of CPT®: reviewed and ordered  Decide to obtain previous medical records or to obtain history from someone other than the patient: yes  Obtain history from someone other than the patient: yes  Review and summarize past medical records: yes  Independent visualization of images, tracings, or specimens: yes    Risk of Complications, Morbidity, and/or Mortality  Presenting problems: moderate  Diagnostic procedures: low  Management options: low    Patient Progress  Patient progress: stable           The following orders were placed after triage and evaluation:  Orders Placed This Encounter   Procedures   • XR Chest 1 View   • [place] Draw   • Comprehensive Metabolic Panel   • BNP   • Troponin   • CBC Auto Differential   • NPO Diet NPO Type: Strict NPO   • Undress & Gown   • Cardiac Monitoring   • Continuous Pulse Oximetry   • Vital Signs   • Check Pulse Oximetry while ambulating   • Oxygen Therapy- Nasal Cannula; 2 LPM; Titrate for SPO2: equal to or greater than,  92%   • ECG 12 Lead ED Triage Standing Order; SOA   • Insert Peripheral IV   • CBC & Differential   • Green Top (Gel)   • Lavender Top   • Gold Top - SST   • Light Blue Top       Final diagnoses:   Shortness of breath   Nonspecific chest pain          Disposition:  ED Disposition     ED Disposition   Eloped    Condition   --    Comment   --             This medical record created using voice recognition software.           Zoran Phillip PA-C  11/21/22 3056

## 2022-11-22 NOTE — DISCHARGE INSTRUCTIONS
Continue all at home medications as prescribed and utilize oxygen at home on PRN basis.  Continue follow-up with your pulmonologist tomorrow as you have scheduled.  Return the emergency department for chest pain, shortness of breath, syncope, other symptoms concerning to you.

## 2022-11-28 ENCOUNTER — TRANSCRIBE ORDERS (OUTPATIENT)
Dept: ADMINISTRATIVE | Facility: HOSPITAL | Age: 57
End: 2022-11-28

## 2022-11-28 DIAGNOSIS — J42 CHRONIC BRONCHITIS, UNSPECIFIED CHRONIC BRONCHITIS TYPE: Primary | ICD-10-CM

## 2022-11-28 DIAGNOSIS — Z85.118 HISTORY OF LUNG CANCER: ICD-10-CM

## 2022-11-28 DIAGNOSIS — J44.9 CHRONIC OBSTRUCTIVE PULMONARY DISEASE, UNSPECIFIED COPD TYPE: ICD-10-CM

## 2022-11-30 ENCOUNTER — APPOINTMENT (OUTPATIENT)
Dept: CT IMAGING | Facility: HOSPITAL | Age: 57
End: 2022-11-30

## 2023-01-04 DIAGNOSIS — R11.0 NAUSEA: ICD-10-CM

## 2023-01-04 RX ORDER — ONDANSETRON HYDROCHLORIDE 8 MG/1
TABLET, FILM COATED ORAL
Qty: 120 TABLET | Refills: 1 | Status: SHIPPED | OUTPATIENT
Start: 2023-01-04

## 2023-02-12 RX ORDER — NICOTINE 21 MG/24HR
PATCH, TRANSDERMAL 24 HOURS TRANSDERMAL
Qty: 28 PATCH | Refills: 0 | OUTPATIENT
Start: 2023-02-12

## 2023-02-15 NOTE — TELEPHONE ENCOUNTER
I have called the patient and left a voicemail to call the office back. No response back from patient.

## 2023-02-18 ENCOUNTER — APPOINTMENT (OUTPATIENT)
Dept: CT IMAGING | Facility: HOSPITAL | Age: 58
End: 2023-02-18
Payer: MEDICAID

## 2023-02-18 ENCOUNTER — APPOINTMENT (OUTPATIENT)
Dept: GENERAL RADIOLOGY | Facility: HOSPITAL | Age: 58
End: 2023-02-18
Payer: MEDICAID

## 2023-02-18 ENCOUNTER — HOSPITAL ENCOUNTER (EMERGENCY)
Facility: HOSPITAL | Age: 58
Discharge: LEFT BEFORE DISCHARGE | End: 2023-02-18
Attending: STUDENT IN AN ORGANIZED HEALTH CARE EDUCATION/TRAINING PROGRAM | Admitting: STUDENT IN AN ORGANIZED HEALTH CARE EDUCATION/TRAINING PROGRAM
Payer: MEDICAID

## 2023-02-18 VITALS
SYSTOLIC BLOOD PRESSURE: 119 MMHG | TEMPERATURE: 98.3 F | RESPIRATION RATE: 18 BRPM | OXYGEN SATURATION: 99 % | WEIGHT: 168.65 LBS | HEIGHT: 72 IN | DIASTOLIC BLOOD PRESSURE: 90 MMHG | BODY MASS INDEX: 22.84 KG/M2 | HEART RATE: 91 BPM

## 2023-02-18 DIAGNOSIS — R10.84 GENERALIZED ABDOMINAL PAIN: Primary | ICD-10-CM

## 2023-02-18 LAB
ALBUMIN SERPL-MCNC: 3.8 G/DL (ref 3.5–5.2)
ALBUMIN/GLOB SERPL: 1.1 G/DL
ALP SERPL-CCNC: 93 U/L (ref 39–117)
ALT SERPL W P-5'-P-CCNC: 13 U/L (ref 1–41)
ANION GAP SERPL CALCULATED.3IONS-SCNC: 11 MMOL/L (ref 5–15)
AST SERPL-CCNC: 18 U/L (ref 1–40)
BASOPHILS # BLD AUTO: 0.06 10*3/MM3 (ref 0–0.2)
BASOPHILS NFR BLD AUTO: 0.5 % (ref 0–1.5)
BILIRUB SERPL-MCNC: 0.2 MG/DL (ref 0–1.2)
BUN SERPL-MCNC: 17 MG/DL (ref 6–20)
BUN/CREAT SERPL: 18.9 (ref 7–25)
CALCIUM SPEC-SCNC: 9.6 MG/DL (ref 8.6–10.5)
CHLORIDE SERPL-SCNC: 100 MMOL/L (ref 98–107)
CO2 SERPL-SCNC: 26 MMOL/L (ref 22–29)
CREAT SERPL-MCNC: 0.9 MG/DL (ref 0.76–1.27)
DEPRECATED RDW RBC AUTO: 48.4 FL (ref 37–54)
EGFRCR SERPLBLD CKD-EPI 2021: 100.9 ML/MIN/1.73
EOSINOPHIL # BLD AUTO: 0.26 10*3/MM3 (ref 0–0.4)
EOSINOPHIL NFR BLD AUTO: 2.2 % (ref 0.3–6.2)
ERYTHROCYTE [DISTWIDTH] IN BLOOD BY AUTOMATED COUNT: 14.4 % (ref 12.3–15.4)
GLOBULIN UR ELPH-MCNC: 3.6 GM/DL
GLUCOSE SERPL-MCNC: 79 MG/DL (ref 65–99)
HCT VFR BLD AUTO: 43 % (ref 37.5–51)
HGB BLD-MCNC: 14.3 G/DL (ref 13–17.7)
HOLD SPECIMEN: NORMAL
HOLD SPECIMEN: NORMAL
IMM GRANULOCYTES # BLD AUTO: 0.06 10*3/MM3 (ref 0–0.05)
IMM GRANULOCYTES NFR BLD AUTO: 0.5 % (ref 0–0.5)
LYMPHOCYTES # BLD AUTO: 2.63 10*3/MM3 (ref 0.7–3.1)
LYMPHOCYTES NFR BLD AUTO: 22.6 % (ref 19.6–45.3)
MCH RBC QN AUTO: 30.5 PG (ref 26.6–33)
MCHC RBC AUTO-ENTMCNC: 33.3 G/DL (ref 31.5–35.7)
MCV RBC AUTO: 91.7 FL (ref 79–97)
MONOCYTES # BLD AUTO: 1.39 10*3/MM3 (ref 0.1–0.9)
MONOCYTES NFR BLD AUTO: 12 % (ref 5–12)
NEUTROPHILS NFR BLD AUTO: 62.2 % (ref 42.7–76)
NEUTROPHILS NFR BLD AUTO: 7.22 10*3/MM3 (ref 1.7–7)
NRBC BLD AUTO-RTO: 0 /100 WBC (ref 0–0.2)
NT-PROBNP SERPL-MCNC: <36 PG/ML (ref 0–900)
PLATELET # BLD AUTO: 336 10*3/MM3 (ref 140–450)
PMV BLD AUTO: 10.5 FL (ref 6–12)
POTASSIUM SERPL-SCNC: 4.7 MMOL/L (ref 3.5–5.2)
PROT SERPL-MCNC: 7.4 G/DL (ref 6–8.5)
RBC # BLD AUTO: 4.69 10*6/MM3 (ref 4.14–5.8)
SODIUM SERPL-SCNC: 137 MMOL/L (ref 136–145)
TROPONIN T SERPL HS-MCNC: 10 NG/L
WBC NRBC COR # BLD: 11.62 10*3/MM3 (ref 3.4–10.8)
WHOLE BLOOD HOLD COAG: NORMAL
WHOLE BLOOD HOLD SPECIMEN: NORMAL

## 2023-02-18 PROCEDURE — 94640 AIRWAY INHALATION TREATMENT: CPT

## 2023-02-18 PROCEDURE — 84484 ASSAY OF TROPONIN QUANT: CPT

## 2023-02-18 PROCEDURE — 71260 CT THORAX DX C+: CPT

## 2023-02-18 PROCEDURE — 80053 COMPREHEN METABOLIC PANEL: CPT

## 2023-02-18 PROCEDURE — 93005 ELECTROCARDIOGRAM TRACING: CPT

## 2023-02-18 PROCEDURE — 93005 ELECTROCARDIOGRAM TRACING: CPT | Performed by: STUDENT IN AN ORGANIZED HEALTH CARE EDUCATION/TRAINING PROGRAM

## 2023-02-18 PROCEDURE — 83880 ASSAY OF NATRIURETIC PEPTIDE: CPT

## 2023-02-18 PROCEDURE — 85025 COMPLETE CBC W/AUTO DIFF WBC: CPT

## 2023-02-18 PROCEDURE — 93010 ELECTROCARDIOGRAM REPORT: CPT | Performed by: SPECIALIST

## 2023-02-18 PROCEDURE — 36415 COLL VENOUS BLD VENIPUNCTURE: CPT

## 2023-02-18 PROCEDURE — 74177 CT ABD & PELVIS W/CONTRAST: CPT

## 2023-02-18 PROCEDURE — 71045 X-RAY EXAM CHEST 1 VIEW: CPT

## 2023-02-18 PROCEDURE — 94799 UNLISTED PULMONARY SVC/PX: CPT

## 2023-02-18 PROCEDURE — 0 IOPAMIDOL PER 1 ML: Performed by: STUDENT IN AN ORGANIZED HEALTH CARE EDUCATION/TRAINING PROGRAM

## 2023-02-18 PROCEDURE — 99284 EMERGENCY DEPT VISIT MOD MDM: CPT

## 2023-02-18 RX ORDER — IPRATROPIUM BROMIDE AND ALBUTEROL SULFATE 2.5; .5 MG/3ML; MG/3ML
3 SOLUTION RESPIRATORY (INHALATION) ONCE
Status: COMPLETED | OUTPATIENT
Start: 2023-02-18 | End: 2023-02-18

## 2023-02-18 RX ORDER — SODIUM CHLORIDE 0.9 % (FLUSH) 0.9 %
10 SYRINGE (ML) INJECTION AS NEEDED
Status: DISCONTINUED | OUTPATIENT
Start: 2023-02-18 | End: 2023-02-18 | Stop reason: HOSPADM

## 2023-02-18 RX ADMIN — IPRATROPIUM BROMIDE AND ALBUTEROL SULFATE 3 ML: .5; 2.5 SOLUTION RESPIRATORY (INHALATION) at 16:08

## 2023-02-18 RX ADMIN — IOPAMIDOL 100 ML: 755 INJECTION, SOLUTION INTRAVENOUS at 16:52

## 2023-02-18 NOTE — ED PROVIDER NOTES
Time: 3:49 PM EST  Date of encounter:  2/18/2023  Independent Historian/Clinical History and Information was obtained by:   Patient  Chief Complaint: SOB    History is limited by: N/A    History of Present Illness:  Patient is a 57 y.o. year old male who presents to the emergency department for evaluation of SOB for the past month. Pt had a lobectomy of the right lung. Pt states that he is on 2L O2 at home. Pt had the lobectomy last year due to lung cancer. His cancer is currently in remission. Pt is having pain to upper abdomen as well.    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 (HCC) 6/7/2021     Past Surgical History:   Procedure Laterality Date   • LUNG BIOPSY     • LUNG REMOVAL, PARTIAL  03/26/2021    [place]     Family History   Adopted: Yes   Problem Relation Age of Onset   • No Known Problems Mother    • No Known Problems Father    • No Known Problems Sister    • No Known Problems Brother    • No Known Problems Maternal Grandmother    • No Known Problems Maternal Grandfather    • No Known Problems Paternal Grandmother    • No Known Problems Paternal Grandfather    • No Known Problems Other        Home Medications:  Prior to Admission medications    Medication Sig Start Date End Date Taking? Authorizing Provider   aspirin 81 MG EC tablet Aspir-81 81 mg oral tablet,delayed release (DR/EC) take 1 tablet (81 mg) by oral route once daily   Active    Provider, MD Asher   azithromycin (Zithromax Z-Amaury) 250 MG tablet Take 2 tablets by mouth on day 1, then 1 tablet daily on  days 2-5 10/27/22   Eugenio Cedeño DO   budesonide-formoterol (Symbicort) 160-4.5 MCG/ACT inhaler Inhale 2 puffs 2 (Two) Times a Day for 30 days. Rinse mouth out after each use 10/13/22 11/12/22  Jannet Headley APRN   DULoxetine (CYMBALTA) 30 MG capsule Take 1 capsule by mouth every night at bedtime. 10/11/22   Eugenio Cedeño DO   ezetimibe (ZETIA) 10 MG tablet TAKE ONE TABLET BY MOUTH DAILY 3/4/22   Nidhi Ma, BENNIE   famotidine (PEPCID) 20 MG tablet Take 1 tablet by mouth 2 (Two) Times a Day. 10/11/22 10/11/23  Eugenio Cedeño DO   gabapentin (NEURONTIN) 800 MG tablet Take 1 tablet by mouth 3 (Three) Times a Day. 10/6/22   Eugenio Cedeño DO   HYDROcodone-acetaminophen (NORCO)  MG per tablet Take 1 tablet by mouth 4 (Four) Times a Day As Needed. 2/17/22   Provider, MD Asher   hydrOXYzine (ATARAX) 50 MG tablet Take 1 tablet by mouth 3 (Three) Times a Day As Needed for Anxiety. 10/11/22   Eugenio Cedeño DO   ipratropium-albuterol (DUO-NEB) 0.5-2.5 mg/3 ml nebulizer Take 3 mL by nebulization Every 4 (Four) Hours As Needed for Wheezing or Shortness of Air for up to 30 days. 10/13/22 11/12/22  Jannet Headley APRN   lisinopril (PRINIVIL,ZESTRIL) 10 MG tablet Take 1 tablet by mouth Daily. 10/11/22   Eugenio Cedeño DO   methylPREDNISolone (MEDROL) 4 MG dose pack Take as directed on package instructions. 10/27/22   Eugenio Cedeño DO   metoprolol succinate XL (TOPROL-XL) 25 MG 24 hr tablet Take 0.5 tablets by mouth 2 (Two) Times a Day. 10/11/22   Eugenio Cedeño DO   naproxen (NAPROSYN) 500 MG tablet Take 1 tablet by mouth 2 (Two) Times a Day With Meals. 10/11/22   Eugenio Cedeño,    nicotine (NICODERM CQ) 21 MG/24HR patch Place 1 patch on the skin as directed by provider Daily. 10/13/22   Jannet Headley APRN   nitroglycerin (Nitrostat) 0.4 MG SL tablet Place 1 tablet under the tongue Every 5 (Five) Minutes  As Needed for Chest Pain. Take no more than 3 doses in 15 minutes. 2/21/22   Eugenio Cedeño DO   omeprazole (priLOSEC) 40 MG capsule Take 1 capsule by mouth Every Morning Before Breakfast. 10/10/22   Eugenio Cedeño DO   ondansetron (ZOFRAN) 8 MG tablet TAKE ONE TABLET BY MOUTH EVERY 8 HOURS AS NEEDED FOR FOR NAUSEA 1/4/23   Clayton King MD   prazosin (MINIPRESS) 2 MG capsule  11/7/21   Asher Chambers MD   prochlorperazine (COMPAZINE) 10 MG tablet TAKE 1 TABLET BY MOUTH EVERY 6 HOURS AS NEEDED FORNAUSEA AND VOMITING 10/9/22   Asher Chambers MD   promethazine (PHENERGAN) 25 MG tablet  9/21/22   Asher Chambers MD   QUEtiapine (SEROquel) 300 MG tablet Take 300 mg by mouth Every Night. 5/17/21   Asher Chambers MD   rosuvastatin (CRESTOR) 40 MG tablet Take 1 tablet by mouth every night at bedtime. 10/11/22   Eugenio Cedeño DO   tiotropium bromide monohydrate (SPIRIVA RESPIMAT) 2.5 MCG/ACT aerosol solution inhaler Inhale 2 puffs Daily for 30 days. 10/13/22 11/12/22  Jannet Headley APRN   venlafaxine XR (EFFEXOR-XR) 37.5 MG 24 hr capsule  10/12/22   Asher Chambers MD        Social History:   Social History     Tobacco Use   • Smoking status: Every Day     Packs/day: 1.00     Years: 40.00     Pack years: 40.00     Types: Cigarettes   • Smokeless tobacco: Former     Types: Chew   • Tobacco comments:     1PPD CURRENTLY   Vaping Use   • Vaping Use: Never used   Substance Use Topics   • Alcohol use: Not Currently   • Drug use: Yes     Comment: PAIN KILLERS         Review of Systems:  Review of Systems   Constitutional: Negative for chills and fever.   HENT: Negative for congestion, rhinorrhea and sore throat.    Eyes: Negative for pain and visual disturbance.   Respiratory: Positive for shortness of breath. Negative for apnea, cough and chest tightness.    Cardiovascular: Negative for chest pain and palpitations.   Gastrointestinal: Positive for abdominal  "pain. Negative for diarrhea, nausea and vomiting.   Genitourinary: Negative for difficulty urinating and dysuria.   Musculoskeletal: Negative for joint swelling and myalgias.   Skin: Negative for color change.   Neurological: Negative for seizures and headaches.   Psychiatric/Behavioral: Negative.    All other systems reviewed and are negative.       Physical Exam:  /90   Pulse 91   Temp 98.3 °F (36.8 °C) (Oral)   Resp 18   Ht 182.9 cm (72\")   Wt 76.5 kg (168 lb 10.4 oz)   SpO2 99%   BMI 22.87 kg/m²     Physical Exam  Vitals and nursing note reviewed.   Constitutional:       General: He is not in acute distress.     Appearance: Normal appearance. He is not toxic-appearing.   HENT:      Head: Normocephalic and atraumatic.      Jaw: There is normal jaw occlusion.   Eyes:      General: Lids are normal.      Extraocular Movements: Extraocular movements intact.      Conjunctiva/sclera: Conjunctivae normal.      Pupils: Pupils are equal, round, and reactive to light.   Cardiovascular:      Rate and Rhythm: Normal rate and regular rhythm.      Pulses: Normal pulses.      Heart sounds: Normal heart sounds.   Pulmonary:      Effort: Pulmonary effort is normal. No respiratory distress.      Breath sounds: Normal breath sounds. No wheezing or rhonchi.   Abdominal:      General: Abdomen is flat.      Palpations: Abdomen is soft.      Tenderness: There is abdominal tenderness in the right upper quadrant and left upper quadrant. There is no guarding or rebound.   Musculoskeletal:         General: Normal range of motion.      Cervical back: Normal range of motion and neck supple.      Right lower leg: No edema.      Left lower leg: No edema.   Skin:     General: Skin is warm and dry.   Neurological:      Mental Status: He is alert and oriented to person, place, and time. Mental status is at baseline.   Psychiatric:         Mood and Affect: Mood normal.                  Procedures:  Procedures      Medical Decision " Making:      Comorbidities that affect care:    COPD, Hypertension    External Notes reviewed:    None      The following orders were placed and all results were independently analyzed by me:  Orders Placed This Encounter   Procedures   • XR Chest 1 View   • CT Chest With Contrast Diagnostic   • CT Abdomen Pelvis With Contrast   • [place] Draw   • Comprehensive Metabolic Panel   • BNP   • High Sensitivity Troponin T   • CBC Auto Differential   • NPO Diet NPO Type: Strict NPO   • Undress & Gown   • Cardiac Monitoring   • Continuous Pulse Oximetry   • Vital Signs   • Oxygen Therapy- Nasal Cannula; 2 LPM; Titrate for SPO2: equal to or greater than, 92%   • ECG 12 Lead ED Triage Standing Order; SOA   • Insert Peripheral IV   • CBC & Differential   • Green Top (Gel)   • Lavender Top   • Gold Top - SST   • Light Blue Top       Medications Given in the Emergency Department:  Medications   sodium chloride 0.9 % flush 10 mL (has no administration in time range)   ipratropium-albuterol (DUO-NEB) nebulizer solution 3 mL (3 mL Nebulization Given 2/18/23 1608)   iopamidol (ISOVUE-370) 76 % injection 100 mL (100 mL Intravenous Given 2/18/23 1652)        ED Course:         Labs:    Lab Results (last 24 hours)     Procedure Component Value Units Date/Time    CBC & Differential [[id number]]  (Abnormal) Collected: 02/18/23 1414    Specimen: Blood Updated: 02/18/23 1455    Narrative:      The following orders were created for panel order CBC & Differential.  Procedure                               Abnormality         Status                     ---------                               -----------         ------                     CBC Auto Differential[[id number]]        Abnormal            Final result                 Please view results for these tests on the individual orders.    Comprehensive Metabolic Panel [[id number]] Collected: 02/18/23 1414    Specimen: Blood Updated: 02/18/23 1527     Glucose 79 mg/dL      BUN 17 mg/dL       Creatinine 0.90 mg/dL      Sodium 137 mmol/L      Potassium 4.7 mmol/L      Chloride 100 mmol/L      CO2 26.0 mmol/L      Calcium 9.6 mg/dL      Total Protein 7.4 g/dL      Albumin 3.8 g/dL      ALT (SGPT) 13 U/L      AST (SGOT) 18 U/L      Alkaline Phosphatase 93 U/L      Total Bilirubin 0.2 mg/dL      Globulin 3.6 gm/dL      A/G Ratio 1.1 g/dL      BUN/Creatinine Ratio 18.9     Anion Gap 11.0 mmol/L      eGFR 100.9 mL/min/1.73     Narrative:      GFR Normal >60  Chronic Kidney Disease <60  Kidney Failure <15      BNP [[id number]]  (Normal) Collected: 02/18/23 1414    Specimen: Blood Updated: 02/18/23 1525     proBNP <36.0 pg/mL     Narrative:      Among patients with dyspnea, NT-proBNP is highly sensitive for the detection of acute congestive heart failure. In addition NT-proBNP of <300 pg/ml effectively rules out acute congestive heart failure with 99% negative predictive value.      High Sensitivity Troponin T [[id number]]  (Normal) Collected: 02/18/23 1414    Specimen: Blood Updated: 02/18/23 1527     HS Troponin T 10 ng/L     Narrative:      High Sensitive Troponin T Reference Range:  <10.0 ng/L- Negative Female for AMI  <15.0 ng/L- Negative Male for AMI  >=10 - Abnormal Female indicating possible myocardial injury.  >=15 - Abnormal Male indicating possible myocardial injury.   Clinicians would have to utilize clinical acumen, EKG, Troponin, and serial changes to determine if it is an Acute Myocardial Infarction or myocardial injury due to an underlying chronic condition.         CBC Auto Differential [[id number]]  (Abnormal) Collected: 02/18/23 1414    Specimen: Blood Updated: 02/18/23 1455     WBC 11.62 10*3/mm3      RBC 4.69 10*6/mm3      Hemoglobin 14.3 g/dL      Hematocrit 43.0 %      MCV 91.7 fL      MCH 30.5 pg      MCHC 33.3 g/dL      RDW 14.4 %      RDW-SD 48.4 fl      MPV 10.5 fL      Platelets 336 10*3/mm3      Neutrophil % 62.2 %      Lymphocyte % 22.6 %      Monocyte % 12.0 %      Eosinophil % 2.2 %       Basophil % 0.5 %      Immature Grans % 0.5 %      Neutrophils, Absolute 7.22 10*3/mm3      Lymphocytes, Absolute 2.63 10*3/mm3      Monocytes, Absolute 1.39 10*3/mm3      Eosinophils, Absolute 0.26 10*3/mm3      Basophils, Absolute 0.06 10*3/mm3      Immature Grans, Absolute 0.06 10*3/mm3      nRBC 0.0 /100 WBC            Imaging:    CT Chest With Contrast Diagnostic    Result Date: 2/18/2023  PROCEDURE: CT CHEST W CONTRAST DIAGNOSTIC  COMPARISON:  [facility], CT, CT CHEST W CONTRAST DIAGNOSTIC, 7/07/2022, 9:56. INDICATIONS: shortness of breath  TECHNIQUE: After obtaining the patient's consent, CT images were obtained with non-ionic intravenous contrast material.   PROTOCOL:   Standard imaging protocol performed    RADIATION:   DLP: 254.7 mGy*cm   Automated exposure control was utilized to minimize radiation dose. CONTRAST: 100 cc Isovue 370 I.V.  FINDINGS:  There is no evidence of pulmonary embolism.  An upper right paratracheal lymph node measuring 0.7 cm in short axis is noted, previously 0.4 cm.  Several sub cm lymph nodes are noted in the AP window measuring up to 0.8 cm, also larger in the interval.  A subcarinal lymph node measures 1.1 cm in short axis, larger in the interval.  Left hilar adenopathy measures 1.4 cm in short axis, larger in the interval.  No axillary adenopathy is seen.  No pleural or pericardial effusion is seen.  There have been previous right middle and right lower lobectomies.  Emphysematous changes are noted.  A few irregular densities in the right upper lobe are noted, favored to be post inflammatory in etiology.  Posteriorly within the right upper lobe is irregular density which may represent pneumonia versus scarring.  A 0.7 cm nodule in the lingula is new in the interval.  Small airspace opacities in the left lower lobe near the diaphragm may represent atelectasis versus small infiltrates.  Along the peripheral margin of the right hepatic lobe on image 129 is a  1.4 cm fat density which likely represents a pseudo lipoma of the Gavin capsule.  The upper abdomen appears otherwise unremarkable.  No destructive osseous lesion is seen.        1. Interval development of mediastinal and hilar adenopathy, as above.  This can be either infectious or neoplastic in etiology. 2. Previous right middle and right lower lobectomies 3. Mild airspace opacity in the right upper lobe may represent atelectasis/scarring versus infiltrate.  Correlate with patient symptoms.  Consider a follow-up short-term chest CT in 2 months to re-evaluate. 4. Indeterminate 0.7 cm nodular density in the lingula. 5. Small infiltrate versus atelectasis in the left lower lobe.     Darwin Felipe M.D.       Electronically Signed and Approved By: Darwin Felipe M.D. on 2/18/2023 at 18:05             CT Abdomen Pelvis With Contrast    Result Date: 2/18/2023  PROCEDURE: CT ABDOMEN PELVIS W CONTRAST  COMPARISON: [facility], CT, CT ABDOMEN PELVIS W CONTRAST, 7/07/2022, 9:56.  INDICATIONS: ruq pain history of cancer  TECHNIQUE: After obtaining the patient's consent, CT images were created with non-ionic intravenous contrast material.   PROTOCOL:   Standard imaging protocol performed    RADIATION:   DLP: 642 mGy*cm   Automated exposure control was utilized to minimize radiation dose. CONTRAST: 100 cc Isovue 370 I.V. LABS:   eGFR: >60 ml/min/1.73m2  FINDINGS:  Please refer to the chest CT for findings superior to the diaphragm.  Along the lateral periphery of the right hepatic lobe is a 1.3 cm fat containing density, likely representing a pseudo lipoma of the Gavin capsule.  No focal hepatic lesion is seen.  The gallbladder, spleen, pancreas and adrenal glands appear unremarkable.  Both kidneys appear normal.    No adenopathy or free fluid is seen in the abdomen or pelvis.  The urinary bladder, prostate and seminal vesicles appear normal for age.  A small to moderate amount of stool is noted in the  large bowel.  No acute bowel abnormality is identified.  No destructive osseous lesion is seen.        1. Small to moderate amount of stool in the large bowel.  Correlate clinically for constipation.     Darwin Felipe M.D.       Electronically Signed and Approved By: Darwin Felipe M.D. on 2/18/2023 at 18:11             XR Chest 1 View    Result Date: 2/18/2023  PROCEDURE: XR CHEST 1 VW  COMPARISON: [facility], CT, CT CHEST W CONTRAST DIAGNOSTIC, 7/07/2022, 9:56.  [facility], CR, XR CHEST 1 VW, 11/21/2022, 17:38.  INDICATIONS: SOA Triage Protocol  FINDINGS:  Diffusely increased interstitial markings are noted bilaterally.  Emphysematous changes are present.  No acute consolidation or effusion is identified.  The cardiac and mediastinal silhouettes appear normal.        1. Emphysematous changes 2. Increased interstitial markings likely related to chronic interstitial lung disease such as secondary to smoking. 3. No acute consolidation       Darwin Felipe M.D.       Electronically Signed and Approved By: Darwin Felipe M.D. on 2/18/2023 at 15:41                 Differential Diagnosis and Discussion:    Dyspnea: Differential diagnosis includes but is not limited to metabolic acidosis, neurological disorders, psychogenic, asthma, pneumothorax, upper airway obstruction, COPD, pneumonia, noncardiogenic pulmonary edema, interstitial lung disease, anemia, congestive heart failure, and pulmonary embolism    All labs were reviewed and interpreted by me.    MDM     I discussed with the patient via phone about the enlarged lymph nodes that could be neoplastic in nature.  CT of the abdomen showed probable constipation.  Patient has known previous cancer and needs to follow-up with oncologist.  Have voiced my concern with the adenopathy and importance of close follow-up      Consultants/Shared Management Plan:    None    Social Determinants of Health:    Patient is independent, reliable, and has  access to care.       Disposition and Care Coordination:    Discharged: I considered escalation of care by admitting this patient for observation, however the patient has improved and is suitable and  stable for discharge.    I have explained discharge medications and the need for follow up with the patient/caretakers. This was also printed in the discharge instructions. Patient was discharged with the following medications and follow up:      Medication List      No changes were made to your prescriptions during this visit.      No follow-up provider specified.     Final diagnoses:   Generalized abdominal pain        ED Disposition     ED Disposition   AMA    Condition   --    Comment   --             This medical record created using voice recognition software.             Kirk Levin  02/18/23 [id number]       Camilla Barker MD  02/18/23 [id number]

## 2023-02-19 LAB — QT INTERVAL: 342 MS

## 2023-02-20 ENCOUNTER — TELEPHONE (OUTPATIENT)
Dept: ONCOLOGY | Facility: HOSPITAL | Age: 58
End: 2023-02-20
Payer: MEDICAID

## 2023-02-20 RX ORDER — DULOXETIN HYDROCHLORIDE 30 MG/1
CAPSULE, DELAYED RELEASE ORAL
Qty: 180 CAPSULE | OUTPATIENT
Start: 2023-02-20

## 2023-02-20 NOTE — TELEPHONE ENCOUNTER
Caller: Hollis Haji    Relationship: Self    Best call back number: [phone]    What is the best time to reach you: ANYTIME    Who are you requesting to speak with (clinical staff, provider, specific staff member): SCHEDULING    What was the call regarding: PT WAS SEEN IN THE ER ON 2/18 - STATED HE NEEDS A F/U WITH DR CAMACHO. PLEASE CALL TO ADVISE.     Do you require a callback: YES

## 2023-03-01 NOTE — PROGRESS NOTES
Primary Care Provider  Eugenio Cedeño DO     Referring Provider  No ref. provider found     Chief Complaint  Cough, Shortness of Breath, Wheezing, COPD, and Follow-up    Subjective          History of Presenting Illness  Patient is a 57-year-old male, patient of Dr. Watson who presents for management of COPD and has a history of squamous cell carcinoma who presents for a follow up visit today.   Patient was seen in the emergency room on 2/18/2023 due to shortness of breath and some right-sided pain.  Patient does have a history of having a lobectomy of the right lung.  Patient is under the care of Dr. Simpson and Dr. King. Patient had a chest CT scan completed in the emergency room on 2/18/2023.  Report states interval development of mediastinal and hilar adenopathy. Previous right middle and right lower lobectomies. Mild airspace opacity in the right upper lobe may represent atelectasis/scarring versus infiltrate.  Correlate with patient symptoms.  Indeterminate 0.7 cm nodular density in the lingula.  Small infiltrate versus atelectasis in the left lower lobe.  Patient states that he does get short of breath that is worse with exertion, moderate severity, and improved with rest.  Patient states at times he does have right-sided pain where he had a lobectomy.  Patient states that he is taking Symbicort and Spiriva every day as prescribed use albuterol inhaler and DuoNeb nebulizer treatments as needed.  Patient is on 2 L of oxygen per minute via nasal cannula as needed. Patient denies fever, chills, night sweats, swollen glands in the head and neck, unintentional weight loss, hemoptysis, purulent sputum production, dysphagia, chest pain, palpitations, chest tightness, abdominal pain, nausea, vomiting, and diarrhea.  Patient also denies any myalgias, changes in sense of taste and/or smell, sore throat, any other coronavirus or flu-like symptoms.  Patient denies any leg swelling, orthopnea, paroxysmal  nocturnal dyspnea.  Patient is able to perform activities of daily living.        Review of Systems   Constitutional: Negative for activity change, appetite change, chills, diaphoresis, fatigue, fever, unexpected weight gain and unexpected weight loss.        Negative for Insomnia   HENT: Negative for congestion (Nasal), mouth sores, nosebleeds, postnasal drip, sore throat, swollen glands and trouble swallowing.         Negative for Thrush  Negative for Hoarseness  Negative for Allergies/Hay Fever  Negative for Recent Head injury  Negative for Ear Fullness  Negative for Nasal or Sinus pain  Negative for Dry lips  Negative for Nasal discharge   Respiratory: Positive for cough and shortness of breath. Negative for apnea, chest tightness and wheezing.         Negative for Hemoptysis  Negative for Pleuritic pain   Cardiovascular: Negative for chest pain, palpitations and leg swelling.        Negative for Claudication  Negative for Cyanosis  Negative for Dyspnea on exertion   Gastrointestinal: Negative for abdominal pain, diarrhea, nausea, vomiting and GERD.   Musculoskeletal: Negative for joint swelling and myalgias.        Negative for Joint pain  Negative for Joint stiffness   Skin: Negative for color change, dry skin, pallor and rash.   Neurological: Negative for syncope, weakness and headache.   Hematological: Negative for adenopathy. Does not bruise/bleed easily.        Family History   Adopted: Yes   Problem Relation Age of Onset   • No Known Problems Mother    • No Known Problems Father    • No Known Problems Sister    • No Known Problems Brother    • No Known Problems Maternal Grandmother    • No Known Problems Maternal Grandfather    • No Known Problems Paternal Grandmother    • No Known Problems Paternal Grandfather    • No Known Problems Other         Social History     Socioeconomic History   • Marital status:    Tobacco Use   • Smoking status: Every Day     Packs/day: 1.00     Years: 40.00     Pack  years: 40.00     Types: Cigarettes   • Smokeless tobacco: Former     Types: Chew   • Tobacco comments:     1PPD CURRENTLY   Vaping Use   • Vaping Use: Never used   Substance and Sexual Activity   • Alcohol use: Not Currently   • Drug use: Yes     Comment: PAIN KILLERS        Past Medical History:   Diagnosis Date   • Acid reflux disease    • Anxiety    • Arthritis    • COPD (chronic obstructive pulmonary disease) (HCC)    • Depression    • Heart attack (HCC)     2 STNTS   • Lung cancer (HCC)    • PTSD (post-traumatic stress disorder)    • SOB (shortness of breath)    • 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 (HCC) 6/7/2021        Immunization History   Administered Date(s) Administered   • COVID-19 (PFIZER) PURPLE CAP 04/13/2021, 04/13/2021, 05/04/2021, 05/04/2021, 11/10/2021   • Flu Vaccine Quad PF >36MO 10/29/2019, 11/17/2020   • Flu Vaccine Split Quad 10/29/2019, 11/17/2020   • FluLaval/Fluzone >6mos 10/29/2019, 11/17/2020, 10/13/2022   • Fluzone Quad >6mos (Multi-dose) 10/30/2018, 09/30/2019   • Influenza, Unspecified 12/01/2020   • Pneumococcal Polysaccharide (PPSV23) 01/08/2018       No Known Allergies       Current Outpatient Medications:   •  budesonide-formoterol (Symbicort) 160-4.5 MCG/ACT inhaler, Inhale 2 puffs 2 (Two) Times a Day for 30 days. Rinse mouth out after each use, Disp: 1 each, Rfl: 11  •  DULoxetine (CYMBALTA) 30 MG capsule, Take 1 capsule by mouth every night at bedtime., Disp: 90 capsule, Rfl: 1  •  ezetimibe (ZETIA) 10 MG tablet, TAKE ONE TABLET BY MOUTH DAILY, Disp: 90 tablet, Rfl: 3  •  famotidine (PEPCID) 20 MG tablet, Take 1 tablet by mouth 2 (Two) Times a Day., Disp: 180 tablet, Rfl: 1  •  gabapentin (NEURONTIN) 800 MG tablet, Take 1 tablet by mouth 3 (Three) Times a Day., Disp: 90 tablet, Rfl: 5  •  HYDROcodone-acetaminophen (NORCO)  MG per tablet, Take 1 tablet by mouth 4 (Four) Times a Day As Needed., Disp: , Rfl:   •  hydrOXYzine (ATARAX) 50 MG tablet, Take 1  tablet by mouth 3 (Three) Times a Day As Needed for Anxiety., Disp: 90 tablet, Rfl: 1  •  ipratropium-albuterol (DUO-NEB) 0.5-2.5 mg/3 ml nebulizer, Take 3 mL by nebulization Every 4 (Four) Hours As Needed for Wheezing or Shortness of Air for up to 30 days., Disp: 360 mL, Rfl: 11  •  lisinopril (PRINIVIL,ZESTRIL) 10 MG tablet, Take 1 tablet by mouth Daily., Disp: 90 tablet, Rfl: 1  •  metoprolol succinate XL (TOPROL-XL) 25 MG 24 hr tablet, Take 0.5 tablets by mouth 2 (Two) Times a Day., Disp: 180 tablet, Rfl: 1  •  nitroglycerin (Nitrostat) 0.4 MG SL tablet, Place 1 tablet under the tongue Every 5 (Five) Minutes As Needed for Chest Pain. Take no more than 3 doses in 15 minutes., Disp: 25 tablet, Rfl: 4  •  omeprazole (priLOSEC) 40 MG capsule, Take 1 capsule by mouth Every Morning Before Breakfast., Disp: 90 capsule, Rfl: 3  •  ondansetron (ZOFRAN) 8 MG tablet, TAKE ONE TABLET BY MOUTH EVERY 8 HOURS AS NEEDED FOR FOR NAUSEA, Disp: 120 tablet, Rfl: 1  •  prochlorperazine (COMPAZINE) 10 MG tablet, TAKE 1 TABLET BY MOUTH EVERY 6 HOURS AS NEEDED FORNAUSEA AND VOMITING, Disp: , Rfl:   •  QUEtiapine (SEROquel) 300 MG tablet, Take 1 tablet by mouth Every Night., Disp: , Rfl:   •  rosuvastatin (CRESTOR) 40 MG tablet, Take 1 tablet by mouth every night at bedtime., Disp: 90 tablet, Rfl: 1  •  tiotropium bromide monohydrate (SPIRIVA RESPIMAT) 2.5 MCG/ACT aerosol solution inhaler, Inhale 2 puffs Daily for 30 days., Disp: 1 each, Rfl: 11  •  venlafaxine XR (EFFEXOR-XR) 37.5 MG 24 hr capsule, , Disp: , Rfl:   •  Ventolin  (90 Base) MCG/ACT inhaler, Inhale 2 puffs Every 4 (Four) Hours As Needed for Wheezing for up to 30 days., Disp: 18 g, Rfl: 11  •  aspirin 81 MG EC tablet, Aspir-81 81 mg oral tablet,delayed release (DR/EC) take 1 tablet (81 mg) by oral route once daily   Active, Disp: , Rfl:   •  azithromycin (Zithromax Z-Amaury) 250 MG tablet, Take 2 tablets by mouth on day 1, then 1 tablet daily on days 2-5, Disp: 6  "tablet, Rfl: 0  •  methylPREDNISolone (MEDROL) 4 MG dose pack, Take as directed on package instructions., Disp: 21 tablet, Rfl: 0  •  naproxen (NAPROSYN) 500 MG tablet, Take 1 tablet by mouth 2 (Two) Times a Day With Meals., Disp: 60 tablet, Rfl: 5  •  nicotine (NICODERM CQ) 21 MG/24HR patch, Place 1 patch on the skin as directed by provider Daily., Disp: 30 patch, Rfl: 0  •  prazosin (MINIPRESS) 2 MG capsule, , Disp: , Rfl:   •  promethazine (PHENERGAN) 25 MG tablet, , Disp: , Rfl:      Objective     Physical Exam  Vital Signs:   WDWN, Alert, NAD.    HEENT:  PERRL, EOMI.  OP, nares clear, no sinus tenderness  Neck:  Supple, no JVD, no thyromegaly.  Lymph: no axillary, cervical, supraclavicular lymphadenopathy noted bilaterally  Chest:  decreased breath sounds throughout. No wheezes, rales, or rhonchi appreciated.  Normal work of breathing noted.  Patient is able speak full sentences without difficulty.  CV: RRR, no MGR, pulses 2+, equal.  Abd:  Soft, NT, ND, + BS, no HSM  EXT:  no clubbing, no cyanosis, no edema, no joint tenderness  Neuro:  A&Ox3, CN grossly intact, no focal deficits.  Skin: No rashes or lesions noted.    /80 (BP Location: Left arm, Patient Position: Sitting, Cuff Size: Large Adult)   Pulse 88   Temp 97.1 °F (36.2 °C) (Tympanic)   Resp 16   Ht 182.9 cm (72.01\")   Wt 78.3 kg (172 lb 11.2 oz)   SpO2 94% Comment: room air  BMI 23.42 kg/m²         Result Review :   I have reviewed my last office visit note.  I also reviewed chest CT report dated from 2/18/2023.  See scanned report.    Procedures:         Assessment and Plan      Assessment:  1.  COPD without acute exacerbation.  Patient is on triple inhaler therapy.   2.  3.8 cm right hilar mass, positive for squamous cell carcinoma with VATS exploration with posterior lateral thoracotomy and lower and middle bilobectomy. Patient care Dr. Calvin King.    3.  History of streptococcuspneumoniae and streptococcus agalacticae group B on " bronch. Pt treated.  4.  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 Villa, did not light up on PET scan.   5.  History of lung nodule.   6.  Abnormal chest CT scan.    7.  Mediastinal lymphadenopathy.  8.  Hilar lymphadenopathy.  9. Tobacco abuse with cigarettes, ongoing.           Plan:  1.  Patient had a chest CT scan completed on 2/18/2023 which is showing interval development of mediastinal and hilar adenopathy.  Communicated with Dr. Simpson prior to patient's office visit today.  We will send patient for EBUS/bronchoscopy with bronchoalveolar lavage, brushings, biopsy.  I have discussed the risks of the procedure with the patient including pneumothorax, hemothorax, bleeding, hypoxia, required mechanical ventilation and death. The patient recognizes these findings, acknowledges these findings and is agreeable to the procedure.  Patient is advised hold aspirin 2 days prior to procedure.  2.  Will order a pet scan. Order placed today.   3.  Patient is advised to follow-up with Dr. Simpson and Dr. King.  4.  Continue Symbicort and Spiriva as prescribed and rinse mouth out after each use.  5.  Continue albuterol inhaler and DuoNeb nebulizer treatments as needed.  6.  Continue oxygen to keep SPO2 at 89% and above.  7.  Vaccination status: patient reports they are up-to-date with flu, pneumonia, and Covid vaccines.  Patient is advised to continue to follow CDC recommendations such as social distancing wearing a mask and washing hands for at least 20 seconds.  8.  Smoking status: patient is a current cigarette smoker.  I spent counseled the patient on smoking cessation.  I counseled the patient on the risks of continued smoking including the risk of lung cancer, head and neck cancer, renal cancer, heart disease, stroke, and early death.  Patient refuses nicotine replacement therapy or pharmacotherapy at this time.  Patient is advised to decrease the number of cigarettes they  are smoking up until the point to where they can quit.  9.  Patient to call the office, 911, or go to the ER with new or worsening symptoms.  10.  Follow-up after EBUS/bronchoscopy completed, sooner if needed.          I spent 42 minutes caring for Hollis on this date of service. This time includes time spent by me in the following activities:preparing for the visit, reviewing tests, obtaining and/or reviewing a separately obtained history, performing a medically appropriate examination and/or evaluation , counseling and educating the patient/family/caregiver, ordering medications, tests, or procedures, referring and communicating with other health care professionals , documenting information in the medical record, independently interpreting results and communicating that information with the patient/family/caregiver and care coordination    Follow Up   Return for with Dr. Simpson as scheduled on 3/22/23.  Patient was given instructions and counseling regarding his condition or for health maintenance advice. Please see specific information pulled into the AVS if appropriate.

## 2023-03-02 ENCOUNTER — OFFICE VISIT (OUTPATIENT)
Dept: PULMONOLOGY | Facility: CLINIC | Age: 58
End: 2023-03-02
Payer: MEDICAID

## 2023-03-02 VITALS
BODY MASS INDEX: 23.39 KG/M2 | OXYGEN SATURATION: 94 % | TEMPERATURE: 97.1 F | HEART RATE: 88 BPM | HEIGHT: 72 IN | RESPIRATION RATE: 16 BRPM | DIASTOLIC BLOOD PRESSURE: 80 MMHG | WEIGHT: 172.7 LBS | SYSTOLIC BLOOD PRESSURE: 123 MMHG

## 2023-03-02 DIAGNOSIS — Z72.0 TOBACCO ABUSE: ICD-10-CM

## 2023-03-02 DIAGNOSIS — C34.90 MALIGNANT NEOPLASM OF LUNG, UNSPECIFIED LATERALITY, UNSPECIFIED PART OF LUNG: ICD-10-CM

## 2023-03-02 DIAGNOSIS — C34.90 SQUAMOUS CELL CARCINOMA OF LUNG, UNSPECIFIED LATERALITY: ICD-10-CM

## 2023-03-02 DIAGNOSIS — C34.31 SQUAMOUS CELL CARCINOMA OF BRONCHUS IN RIGHT LOWER LOBE: Primary | ICD-10-CM

## 2023-03-02 DIAGNOSIS — R91.1 LUNG NODULE: ICD-10-CM

## 2023-03-02 DIAGNOSIS — R59.0 HILAR ADENOPATHY: ICD-10-CM

## 2023-03-02 DIAGNOSIS — J44.9 CHRONIC OBSTRUCTIVE PULMONARY DISEASE, UNSPECIFIED COPD TYPE: ICD-10-CM

## 2023-03-02 DIAGNOSIS — R59.0 MEDIASTINAL ADENOPATHY: ICD-10-CM

## 2023-03-02 PROCEDURE — 99215 OFFICE O/P EST HI 40 MIN: CPT | Performed by: NURSE PRACTITIONER

## 2023-03-02 RX ORDER — ALBUTEROL SULFATE 90 UG/1
2 AEROSOL, METERED RESPIRATORY (INHALATION) EVERY 4 HOURS PRN
COMMUNITY
Start: 2023-02-14 | End: 2023-03-02 | Stop reason: SDUPTHER

## 2023-03-02 RX ORDER — IPRATROPIUM BROMIDE AND ALBUTEROL SULFATE 2.5; .5 MG/3ML; MG/3ML
3 SOLUTION RESPIRATORY (INHALATION) EVERY 4 HOURS PRN
Qty: 360 ML | Refills: 11 | Status: SHIPPED | OUTPATIENT
Start: 2023-03-02 | End: 2023-04-01

## 2023-03-02 RX ORDER — ALBUTEROL SULFATE 90 UG/1
2 AEROSOL, METERED RESPIRATORY (INHALATION) EVERY 4 HOURS PRN
Qty: 18 G | Refills: 11 | Status: SHIPPED | OUTPATIENT
Start: 2023-03-02 | End: 2023-04-01

## 2023-03-02 RX ORDER — BUDESONIDE AND FORMOTEROL FUMARATE DIHYDRATE 160; 4.5 UG/1; UG/1
2 AEROSOL RESPIRATORY (INHALATION) 2 TIMES DAILY
Qty: 1 EACH | Refills: 11 | Status: SHIPPED | OUTPATIENT
Start: 2023-03-02 | End: 2023-04-01

## 2023-03-03 ENCOUNTER — TELEPHONE (OUTPATIENT)
Dept: ONCOLOGY | Facility: HOSPITAL | Age: 58
End: 2023-03-03
Payer: MEDICAID

## 2023-03-03 NOTE — TELEPHONE ENCOUNTER
Caller: Hollis Haji    Relationship to patient: Self    Best call back number: [phone]    Chief complaint:PATIENT CANCELLED 3/9/23 BY MISTAKE. CALLING TO REQUEST RESCHEDULE FOR SAME DAY

## 2023-03-24 ENCOUNTER — TELEPHONE (OUTPATIENT)
Dept: PULMONOLOGY | Facility: CLINIC | Age: 58
End: 2023-03-24
Payer: MEDICAID

## 2023-03-24 NOTE — TELEPHONE ENCOUNTER
Received communication from Thelma in [facility], patient did not show for bronchoscopy as scheduled.  Was able to reach patient's sister, Claribel Malloy, @ [phone].  She sates patient has the flu or similar illness and was currently sleeping.  Left message for patient to call when he is feeling better and can reschedule.  Ms. Malloy verbalized understanding.  [phone] - voice mail box full.  [phone] - number not in service.

## 2023-04-19 DIAGNOSIS — G62.9 NEUROPATHY: ICD-10-CM

## 2023-04-20 RX ORDER — GABAPENTIN 800 MG/1
TABLET ORAL
Qty: 90 TABLET | OUTPATIENT
Start: 2023-04-20

## 2023-06-08 RX ORDER — FAMOTIDINE 20 MG/1
20 TABLET, FILM COATED ORAL 2 TIMES DAILY
Qty: 180 TABLET | Refills: 1 | Status: SHIPPED | OUTPATIENT
Start: 2023-06-08 | End: 2024-06-07

## 2023-07-25 RX ORDER — OMEPRAZOLE 40 MG/1
40 CAPSULE, DELAYED RELEASE ORAL
Qty: 90 CAPSULE | Refills: 0 | Status: SHIPPED | OUTPATIENT
Start: 2023-07-25

## 2023-07-31 ENCOUNTER — TELEPHONE (OUTPATIENT)
Dept: ONCOLOGY | Facility: HOSPITAL | Age: 58
End: 2023-07-31

## 2023-07-31 NOTE — TELEPHONE ENCOUNTER
"    Caller: Hollis Haji \"Yash\"    Relationship to patient: Self    Best call back number:   [phone]    Chief complaint: PATIENT REQUESTING TO RESCHEDULE WITH OFFICE    Type of visit: LAB AND FU    Requested date: NEXT AVAILABLE    Additional notes:PATIENT LAST SCHEDULED FOR HOSP FU 3/9/23 BUT CANCELLED    LAST SEEN 6/3/21 IN OFFICE    PATIENT STATES HE MOST RECENTLY SAW MD. SOLORZANO IN THE PAST YEAR [facility]  THROUGH [facility].  "

## 2023-08-29 ENCOUNTER — OFFICE VISIT (OUTPATIENT)
Dept: ONCOLOGY | Facility: HOSPITAL | Age: 58
End: 2023-08-29
Payer: MEDICAID

## 2023-08-29 VITALS
RESPIRATION RATE: 18 BRPM | OXYGEN SATURATION: 98 % | TEMPERATURE: 98.5 F | SYSTOLIC BLOOD PRESSURE: 128 MMHG | BODY MASS INDEX: 21.31 KG/M2 | HEART RATE: 103 BPM | DIASTOLIC BLOOD PRESSURE: 80 MMHG | WEIGHT: 157.19 LBS

## 2023-08-29 DIAGNOSIS — C34.31 SQUAMOUS CELL CARCINOMA OF BRONCHUS IN RIGHT LOWER LOBE: Primary | ICD-10-CM

## 2023-08-29 DIAGNOSIS — Z72.0 TOBACCO ABUSE: ICD-10-CM

## 2023-08-29 PROBLEM — C34.90 LUNG CANCER: Status: RESOLVED | Noted: 2021-06-04 | Resolved: 2023-08-29

## 2023-08-29 PROBLEM — C34.90 SQUAMOUS CELL CARCINOMA OF LUNG: Status: RESOLVED | Noted: 2021-06-15 | Resolved: 2023-08-29

## 2023-08-29 PROCEDURE — G0463 HOSPITAL OUTPT CLINIC VISIT: HCPCS | Performed by: INTERNAL MEDICINE

## 2023-08-29 RX ORDER — ALBUTEROL SULFATE 90 UG/1
2 AEROSOL, METERED RESPIRATORY (INHALATION) EVERY 4 HOURS PRN
COMMUNITY
Start: 2023-08-20

## 2023-08-29 NOTE — PROGRESS NOTES
Chief Complaint  Lung Cancer    Eugenio Cedeño*  Eugenio Cedeño, DO    Subjective          Hollis Haji presents to [facility] for for follow-up of lung cancer.  He has not been seen in this clinic for more than 2 years.  He reports that he received adjuvant chemotherapy with Dr. Cartagena in [facility].  He followed with Dr. Simpson, thoracic oncology approximately a year ago.  Earlier this year he had CT scan of his seen by pulmonology who recommended PET scan and bronchoscopy but he did not keep those appointments.  Patient reports that he had some issues with Dr. Heller office and wanted to follow-up here.  He continues to smoke and is not interested in smoking cessation.    Oncology/Hematology History   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   6/7/2021 Initial Diagnosis    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 (CMS/HCC)     6/7/2021 Cancer Staged    Staging form: Lung, AJCC 8th Edition  - Clinical: Stage IIB (cT2a, cN1, cM0) - Signed by Clayton King MD on 6/7/2021     8/10/2021 - 8/10/2021 Chemotherapy    OP LUNG DOCEtaxel / CISplatin           Review of Systems   Constitutional:  Positive for fatigue and unexpected weight loss. Negative for appetite change, diaphoresis, fever and unexpected weight gain.   HENT:  Negative for hearing loss, mouth sores, sore throat, swollen glands, trouble swallowing and voice change.    Eyes:  Negative for blurred vision.   Respiratory:  Positive for cough and shortness of breath. Negative for wheezing.    Cardiovascular:  Negative for chest pain and palpitations.   Gastrointestinal:  Negative for abdominal pain, blood in stool, constipation, diarrhea, nausea and vomiting.   Endocrine: Negative for cold intolerance and heat intolerance.   Genitourinary:  Negative for difficulty urinating, dysuria, frequency, hematuria and urinary incontinence.   Musculoskeletal:  Negative for  arthralgias, back pain and myalgias.   Skin:  Negative for rash, skin lesions and wound.   Neurological:  Positive for weakness. Negative for dizziness, seizures, numbness and headache.   Hematological:  Does not bruise/bleed easily.   Psychiatric/Behavioral:  Negative for depressed mood. The patient is not nervous/anxious.    Current Outpatient Medications on File Prior to Visit   Medication Sig Dispense Refill    aspirin 81 MG EC tablet Aspir-81 81 mg oral tablet,delayed release (DR/EC) take 1 tablet (81 mg) by oral route once daily   Active      DULoxetine (CYMBALTA) 30 MG capsule Take 1 capsule by mouth every night at bedtime. 90 capsule 1    ezetimibe (ZETIA) 10 MG tablet TAKE ONE TABLET BY MOUTH DAILY 90 tablet 3    famotidine (PEPCID) 20 MG tablet Take 1 tablet by mouth 2 (Two) Times a Day. 180 tablet 1    gabapentin (NEURONTIN) 800 MG tablet Take 1 tablet by mouth 3 (Three) Times a Day. 90 tablet 5    HYDROcodone-acetaminophen (NORCO)  MG per tablet Take 1 tablet by mouth 4 (Four) Times a Day As Needed.      hydrOXYzine (ATARAX) 50 MG tablet Take 1 tablet by mouth 3 (Three) Times a Day As Needed for Anxiety. 90 tablet 1    lisinopril (PRINIVIL,ZESTRIL) 10 MG tablet Take 1 tablet by mouth Daily. 90 tablet 1    methylPREDNISolone (MEDROL) 4 MG dose pack Take as directed on package instructions. 21 tablet 0    metoprolol succinate XL (TOPROL-XL) 25 MG 24 hr tablet Take 0.5 tablets by mouth 2 (Two) Times a Day. 180 tablet 1    naproxen (NAPROSYN) 500 MG tablet Take 1 tablet by mouth 2 (Two) Times a Day With Meals. 60 tablet 5    nitroglycerin (Nitrostat) 0.4 MG SL tablet Place 1 tablet under the tongue Every 5 (Five) Minutes As Needed for Chest Pain. Take no more than 3 doses in 15 minutes. 25 tablet 4    omeprazole (priLOSEC) 40 MG capsule Take 1 capsule by mouth Every Morning Before Breakfast. 90 capsule 0    ondansetron (ZOFRAN) 8 MG tablet TAKE ONE TABLET BY MOUTH EVERY 8 HOURS AS NEEDED FOR FOR NAUSEA  120 tablet 1    prazosin (MINIPRESS) 2 MG capsule       prochlorperazine (COMPAZINE) 10 MG tablet TAKE 1 TABLET BY MOUTH EVERY 6 HOURS AS NEEDED FORNAUSEA AND VOMITING      promethazine (PHENERGAN) 25 MG tablet       rosuvastatin (CRESTOR) 40 MG tablet Take 1 tablet by mouth every night at bedtime. 90 tablet 1    venlafaxine XR (EFFEXOR-XR) 37.5 MG 24 hr capsule       Ventolin  (90 Base) MCG/ACT inhaler 2 puffs Every 4 (Four) Hours As Needed for Wheezing.      azithromycin (Zithromax Z-Amaury) 250 MG tablet Take 2 tablets by mouth on day 1, then 1 tablet daily on days 2-5 (Patient not taking: Reported on 8/29/2023) 6 tablet 0    budesonide-formoterol (Symbicort) 160-4.5 MCG/ACT inhaler Inhale 2 puffs 2 (Two) Times a Day for 30 days. Rinse mouth out after each use 1 each 11    ipratropium-albuterol (DUO-NEB) 0.5-2.5 mg/3 ml nebulizer Take 3 mL by nebulization Every 4 (Four) Hours As Needed for Wheezing or Shortness of Air for up to 30 days. 360 mL 11    nicotine (NICODERM CQ) 21 MG/24HR patch Place 1 patch on the skin as directed by provider Daily. (Patient not taking: Reported on 8/29/2023) 30 patch 0    QUEtiapine (SEROquel) 300 MG tablet Take 1 tablet by mouth Every Night. (Patient not taking: Reported on 8/29/2023)      tiotropium bromide monohydrate (SPIRIVA RESPIMAT) 2.5 MCG/ACT aerosol solution inhaler Inhale 2 puffs Daily for 30 days. 1 each 11     No current facility-administered medications on file prior to visit.       No Known Allergies  Past Medical History:   Diagnosis Date    Acid reflux disease     Anxiety     Arthritis     COPD (chronic obstructive pulmonary disease)     Depression     Heart attack     2 STNTS    Lung cancer     PTSD (post-traumatic stress disorder)     SOB (shortness of breath)     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 6/7/2021     Past Surgical History:   Procedure Laterality Date    LUNG BIOPSY      LUNG REMOVAL, PARTIAL  03/26/2021    [facility]  History     Socioeconomic History    Marital status:    Tobacco Use    Smoking status: Every Day     Packs/day: 1.00     Years: 40.00     Pack years: 40.00     Types: Cigarettes    Smokeless tobacco: Former     Types: Chew    Tobacco comments:     1PPD CURRENTLY   Vaping Use    Vaping Use: Never used   Substance and Sexual Activity    Alcohol use: Not Currently    Drug use: Yes     Comment: PAIN KILLERS    Sexual activity: Defer     Family History   Adopted: Yes   Problem Relation Age of Onset    No Known Problems Mother     No Known Problems Father     No Known Problems Sister     No Known Problems Brother     No Known Problems Maternal Grandmother     No Known Problems Maternal Grandfather     No Known Problems Paternal Grandmother     No Known Problems Paternal Grandfather     No Known Problems Other        Objective   Physical Exam  Vitals reviewed. Exam conducted with a chaperone present.   Constitutional:       General: He is not in acute distress.     Appearance: Normal appearance.   Cardiovascular:      Rate and Rhythm: Normal rate and regular rhythm.      Heart sounds: Normal heart sounds. No murmur heard.    No gallop.   Pulmonary:      Effort: Pulmonary effort is normal.      Breath sounds: Normal breath sounds.   Abdominal:      General: Abdomen is flat. Bowel sounds are normal.      Palpations: Abdomen is soft.   Musculoskeletal:      Right lower leg: No edema.      Left lower leg: No edema.   Neurological:      Mental Status: He is alert and oriented to person, place, and time.   Psychiatric:         Mood and Affect: Mood normal.         Behavior: Behavior normal.       Vitals:    08/29/23 0906   BP: 128/80   Pulse: 103   Resp: 18   Temp: 98.5 øF (36.9 øC)   TempSrc: Temporal   SpO2: 98%   Weight: 71.3 kg (157 lb 3 oz)   PainSc:   8   PainLoc: Rib Cage  Comment: right side     ECOG score: 0         PHQ-9 Total Score:                    Result Review :   The following data was reviewed by: Clayton  STEPHANIA King MD on 08/29/2023:  Lab Results   Component Value Date    HGB 14.3 02/18/2023    HCT 43.0 02/18/2023    MCV 91.7 02/18/2023     02/18/2023    WBC 11.62 (H) 02/18/2023    NEUTROABS 5.5 08/22/2023    LYMPHSABS 2.63 02/18/2023    MONOSABS 1.39 (H) 02/18/2023    EOSABS 0.1 08/22/2023    BASOSABS 0.2 08/22/2023     Lab Results   Component Value Date    GLUCOSE 79 02/18/2023    BUN 17 02/18/2023    CREATININE 0.90 02/18/2023     02/18/2023    K 4.7 02/18/2023     02/18/2023    CO2 26.0 02/18/2023    CALCIUM 9.6 02/18/2023    PROTEINTOT 7.4 02/18/2023    ALBUMIN 3.8 02/18/2023    BILITOT 0.2 02/18/2023    ALKPHOS 93 02/18/2023    AST 18 02/18/2023    ALT 13 02/18/2023     Lab Results   Component Value Date    MG 1.9 12/30/2021    FREET4 0.9 12/13/2019    TSH 1.630 12/13/2019     Lab Results   Component Value Date    IRON 61 (L) 10/13/2021    LABIRON 19 10/13/2021    TRANSFERRIN 271.00 12/16/2019    TIBC 324 10/13/2021     Lab Results   Component Value Date    FERRITIN 127.9 10/13/2021    [id number] 614 10/13/2021     No results found for: PSA, CEA, AFP, ,           Assessment and Plan    Diagnoses and all orders for this visit:    1. 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 (Primary)  Assessment & Plan:  Patient had CT scan earlier this year which showed some borderline lymphadenopathy.  PET scan and bronchoscopy was recommended by pulmonology but he did not keep those appointments.  I will schedule PET scan in the near future with further recommendations based on results.  I will see him back after PET to review.    Orders:  -     NM PET/CT Skull Base to Mid Thigh; Future    2. Tobacco abuse  Assessment & Plan:  Patient continues to smoke.  We discussed smoking cessation counseling with our nurse practitioner who is certified.  He is not interested at this time.              Patient Follow Up: After PET scan    Patient was given instructions and counseling regarding  his condition or for health maintenance advice. Please see specific information pulled into the AVS if appropriate.     Clayton King MD    8/29/2023

## 2023-08-29 NOTE — ASSESSMENT & PLAN NOTE
Patient had CT scan earlier this year which showed some borderline lymphadenopathy.  PET scan and bronchoscopy was recommended by pulmonology but he did not keep those appointments.  I will schedule PET scan in the near future with further recommendations based on results.  I will see him back after PET to review.

## 2023-08-29 NOTE — ASSESSMENT & PLAN NOTE
Patient continues to smoke.  We discussed smoking cessation counseling with our nurse practitioner who is certified.  He is not interested at this time.

## 2023-10-05 ENCOUNTER — HOSPITAL ENCOUNTER (OUTPATIENT)
Dept: PET IMAGING | Facility: HOSPITAL | Age: 58
Discharge: HOME | End: 2023-10-05
Payer: MEDICAID

## 2023-10-05 ENCOUNTER — TELEPHONE (OUTPATIENT)
Dept: ONCOLOGY | Facility: HOSPITAL | Age: 58
End: 2023-10-05
Payer: MEDICAID

## 2023-10-05 DIAGNOSIS — C34.31 SQUAMOUS CELL CARCINOMA OF BRONCHUS IN RIGHT LOWER LOBE: ICD-10-CM

## 2023-10-05 NOTE — TELEPHONE ENCOUNTER
The pt called and states that he had pain in his back from his lungs. The pt states that he had Pleurisy 3 months ago and his pain never went away and is now a 8/10. The pt is asking if Dr King would prescribe a pain med for him. The pt states that he uses the [facility] in [place].

## 2023-10-05 NOTE — TELEPHONE ENCOUNTER
"Caller: Hollis Haji \"Yash\"    Relationship to patient: Self    Best call back number: [phone]    Patient is needing: TO REQUEST CALL FROM NURSE. HE WAS NOT ABLE TO GET PET SCAN TODAY DUE TO HAVING CANDY. AND WAS NOT RESCHEDULED. HE WANTS TO KNOW IF DR. CAMACHO WOULD PRESCRIBE HIM PAIN MEDICATION.     "

## 2024-01-29 ENCOUNTER — TRANSCRIBE ORDERS (OUTPATIENT)
Dept: ADMINISTRATIVE | Facility: HOSPITAL | Age: 59
End: 2024-01-29
Payer: MEDICAID

## 2024-01-29 DIAGNOSIS — R93.89 ABNORMAL CHEST X-RAY: Primary | ICD-10-CM

## 2024-01-29 DIAGNOSIS — F17.210 CIGARETTE SMOKER: ICD-10-CM

## 2024-01-29 DIAGNOSIS — Z90.2 HISTORY OF LOBECTOMY OF LUNG: ICD-10-CM

## 2024-01-29 DIAGNOSIS — Z90.2 S/P LOBECTOMY OF LUNG: ICD-10-CM

## 2024-01-29 DIAGNOSIS — J44.9 CHRONIC OBSTRUCTIVE PULMONARY DISEASE, UNSPECIFIED COPD TYPE: ICD-10-CM

## 2024-01-29 DIAGNOSIS — C34.91 SQUAMOUS CELL CARCINOMA OF LUNG, STAGE II, RIGHT: ICD-10-CM

## 2024-01-30 ENCOUNTER — HOSPITAL ENCOUNTER (OUTPATIENT)
Dept: CT IMAGING | Facility: HOSPITAL | Age: 59
Discharge: HOME | End: 2024-01-30
Admitting: NURSE PRACTITIONER
Payer: MEDICAID

## 2024-01-30 DIAGNOSIS — F17.210 CIGARETTE SMOKER: ICD-10-CM

## 2024-01-30 DIAGNOSIS — C34.91 SQUAMOUS CELL CARCINOMA OF LUNG, STAGE II, RIGHT: ICD-10-CM

## 2024-01-30 DIAGNOSIS — Z90.2 HISTORY OF LOBECTOMY OF LUNG: ICD-10-CM

## 2024-01-30 DIAGNOSIS — J44.9 CHRONIC OBSTRUCTIVE PULMONARY DISEASE, UNSPECIFIED COPD TYPE: ICD-10-CM

## 2024-01-30 DIAGNOSIS — R93.89 ABNORMAL CHEST X-RAY: ICD-10-CM

## 2024-01-30 PROCEDURE — 25510000001 IOPAMIDOL PER 1 ML: Performed by: NURSE PRACTITIONER

## 2024-01-30 PROCEDURE — 71260 CT THORAX DX C+: CPT

## 2024-01-30 RX ADMIN — IOPAMIDOL 100 ML: 755 INJECTION, SOLUTION INTRAVENOUS at 12:53

## 2024-01-31 ENCOUNTER — TELEPHONE (OUTPATIENT)
Dept: ONCOLOGY | Facility: HOSPITAL | Age: 59
End: 2024-01-31
Payer: MEDICAID

## 2024-01-31 NOTE — TELEPHONE ENCOUNTER
Caller: DEBORAH MCKEON    Relationship: WIFE    Best call back number: [phone]     What is the best time to reach you: ASAP    Who are you requesting to speak with (clinical staff, provider,  specific staff member): CLINICAL      What was the call regarding: CALLER IS ASKING TO SPEAK TO DR CAMACHO'S NURSE, SHE HAS MEDICAL QUESTIONS FOR SOMEONE ABOUT THE PATIENT'S CONDITION.  NO BH VERBAL ON FILE FOR THE OFFICE.

## 2024-01-31 NOTE — TELEPHONE ENCOUNTER
Informed spouse that we last saw the patient in August of 23 and had ordered a scan on the patient that he no showed for in October and that he canceled the follow up appointment with Dr. King.

## 2024-02-05 ENCOUNTER — TELEPHONE (OUTPATIENT)
Dept: ONCOLOGY | Facility: HOSPITAL | Age: 59
End: 2024-02-05
Payer: MEDICAID

## 2024-02-05 NOTE — TELEPHONE ENCOUNTER
"    Caller: Hollis Haji \"Yash\"    Relationship to patient: Self    Best call back number: [phone]    Chief complaint:     Type of visit: F/U 2     Requested date: CALL TO R/S    If rescheduling, when is the original appointment: 10/25/2023    Additional notes:COULD NOT R/S WITHIN THE TIMEFRAME, TRANSFERRED PATIENT TO CENTRAL SCHEDULING TO DAVID THE PET SCAN     "

## 2024-02-06 NOTE — TELEPHONE ENCOUNTER
CALLED AND LEFT PT DETAILED VM LETTING HIM KNOW THAT WE HAVE HIM SCHEDULED FOR A FOLLOW UP APPT WITH DR. CAMACHO TO REVIEW PET SCAN RESULTS ON 2/14/24 AT 8:30AM. I REQUESTED THAT PATIENT CALL ME BACK TO CONFIRM HE GOT MY MESSAGE. DR. CAMACHO DOES NOT HAVE ANY OTHER AVAILABILITY IN THE MONTH OF FEBRURARY, SO IF PATIENT IS UNABLE TO MAKE THIS APPOINTMENT, WE WILL HAVE TO LOOK IN MARCH.

## 2024-02-09 ENCOUNTER — TELEPHONE (OUTPATIENT)
Dept: ONCOLOGY | Facility: HOSPITAL | Age: 59
End: 2024-02-09
Payer: MEDICAID

## 2024-02-12 ENCOUNTER — HOSPITAL ENCOUNTER (OUTPATIENT)
Dept: PET IMAGING | Facility: HOSPITAL | Age: 59
Discharge: HOME | End: 2024-02-12
Payer: MEDICAID

## 2024-02-12 PROCEDURE — 78815 PET IMAGE W/CT SKULL-THIGH: CPT

## 2024-02-12 PROCEDURE — 0 FLUDEOXYGLUCOSE F18 SOLUTION: Performed by: INTERNAL MEDICINE

## 2024-02-12 PROCEDURE — A9552 F18 FDG: HCPCS | Performed by: INTERNAL MEDICINE

## 2024-02-12 RX ADMIN — FLUDEOXYGLUCOSE F 18 1 DOSE: 200 INJECTION, SOLUTION INTRAVENOUS at 14:12

## 2024-02-14 ENCOUNTER — OFFICE VISIT (OUTPATIENT)
Dept: PULMONOLOGY | Facility: CLINIC | Age: 59
End: 2024-02-14
Payer: MEDICAID

## 2024-02-14 ENCOUNTER — OFFICE VISIT (OUTPATIENT)
Dept: ONCOLOGY | Facility: HOSPITAL | Age: 59
End: 2024-02-14
Payer: MEDICAID

## 2024-02-14 VITALS
RESPIRATION RATE: 18 BRPM | HEIGHT: 72 IN | WEIGHT: 167.5 LBS | SYSTOLIC BLOOD PRESSURE: 114 MMHG | BODY MASS INDEX: 22.69 KG/M2 | DIASTOLIC BLOOD PRESSURE: 74 MMHG | OXYGEN SATURATION: 100 % | TEMPERATURE: 98.1 F | HEART RATE: 81 BPM

## 2024-02-14 VITALS
RESPIRATION RATE: 18 BRPM | HEIGHT: 72 IN | HEART RATE: 81 BPM | TEMPERATURE: 97 F | DIASTOLIC BLOOD PRESSURE: 81 MMHG | BODY MASS INDEX: 22.87 KG/M2 | WEIGHT: 168.87 LBS | OXYGEN SATURATION: 100 % | SYSTOLIC BLOOD PRESSURE: 129 MMHG

## 2024-02-14 DIAGNOSIS — Z72.0 TOBACCO ABUSE: ICD-10-CM

## 2024-02-14 DIAGNOSIS — J44.9 CHRONIC OBSTRUCTIVE PULMONARY DISEASE, UNSPECIFIED COPD TYPE: ICD-10-CM

## 2024-02-14 DIAGNOSIS — J18.9 MULTIFOCAL PNEUMONIA: ICD-10-CM

## 2024-02-14 DIAGNOSIS — R59.0 MEDIASTINAL ADENOPATHY: ICD-10-CM

## 2024-02-14 DIAGNOSIS — R91.1 LUNG NODULE: ICD-10-CM

## 2024-02-14 DIAGNOSIS — G62.9 NEUROPATHY: ICD-10-CM

## 2024-02-14 DIAGNOSIS — C34.31 SQUAMOUS CELL CARCINOMA OF BRONCHUS IN RIGHT LOWER LOBE: Primary | ICD-10-CM

## 2024-02-14 PROCEDURE — 1160F RVW MEDS BY RX/DR IN RCRD: CPT | Performed by: NURSE PRACTITIONER

## 2024-02-14 PROCEDURE — G0463 HOSPITAL OUTPT CLINIC VISIT: HCPCS | Performed by: INTERNAL MEDICINE

## 2024-02-14 PROCEDURE — 3074F SYST BP LT 130 MM HG: CPT | Performed by: NURSE PRACTITIONER

## 2024-02-14 PROCEDURE — 3078F DIAST BP <80 MM HG: CPT | Performed by: NURSE PRACTITIONER

## 2024-02-14 PROCEDURE — 99214 OFFICE O/P EST MOD 30 MIN: CPT | Performed by: NURSE PRACTITIONER

## 2024-02-14 PROCEDURE — 1159F MED LIST DOCD IN RCRD: CPT | Performed by: NURSE PRACTITIONER

## 2024-02-14 RX ORDER — IPRATROPIUM BROMIDE AND ALBUTEROL SULFATE 2.5; .5 MG/3ML; MG/3ML
3 SOLUTION RESPIRATORY (INHALATION) EVERY 4 HOURS PRN
Qty: 360 ML | Refills: 11 | Status: SHIPPED | OUTPATIENT
Start: 2024-02-14 | End: 2024-03-15

## 2024-02-14 RX ORDER — DULOXETIN HYDROCHLORIDE 30 MG/1
30 CAPSULE, DELAYED RELEASE ORAL
Qty: 90 CAPSULE | Refills: 1 | Status: SHIPPED | OUTPATIENT
Start: 2024-02-14

## 2024-02-14 RX ORDER — ALBUTEROL SULFATE 90 UG/1
2 AEROSOL, METERED RESPIRATORY (INHALATION) EVERY 4 HOURS PRN
Qty: 18 G | Refills: 11 | Status: SHIPPED | OUTPATIENT
Start: 2024-02-14 | End: 2024-03-15

## 2024-02-14 RX ORDER — DULOXETIN HYDROCHLORIDE 30 MG/1
CAPSULE, DELAYED RELEASE ORAL
Qty: 180 CAPSULE | OUTPATIENT
Start: 2024-02-14

## 2024-02-14 RX ORDER — BUDESONIDE, GLYCOPYRROLATE, AND FORMOTEROL FUMARATE 160; 9; 4.8 UG/1; UG/1; UG/1
2 AEROSOL, METERED RESPIRATORY (INHALATION) 2 TIMES DAILY
Qty: 1 EACH | Refills: 11 | Status: SHIPPED | OUTPATIENT
Start: 2024-02-14

## 2024-02-14 RX ORDER — DOXYCYCLINE HYCLATE 100 MG/1
100 CAPSULE ORAL 2 TIMES DAILY
Qty: 14 CAPSULE | Refills: 0 | Status: SHIPPED | OUTPATIENT
Start: 2024-02-14 | End: 2024-02-21

## 2024-02-15 ENCOUNTER — TELEPHONE (OUTPATIENT)
Dept: PULMONOLOGY | Facility: CLINIC | Age: 59
End: 2024-02-15
Payer: MEDICAID

## 2024-02-29 ENCOUNTER — OFFICE VISIT (OUTPATIENT)
Dept: CARDIOLOGY | Facility: CLINIC | Age: 59
End: 2024-02-29
Payer: MEDICAID

## 2024-02-29 VITALS
WEIGHT: 183.6 LBS | DIASTOLIC BLOOD PRESSURE: 66 MMHG | HEART RATE: 78 BPM | BODY MASS INDEX: 24.87 KG/M2 | SYSTOLIC BLOOD PRESSURE: 105 MMHG | HEIGHT: 72 IN

## 2024-02-29 DIAGNOSIS — Z98.61 CAD S/P PERCUTANEOUS CORONARY ANGIOPLASTY: ICD-10-CM

## 2024-02-29 DIAGNOSIS — F17.200 SMOKING: ICD-10-CM

## 2024-02-29 DIAGNOSIS — R06.09 DYSPNEA ON EXERTION: ICD-10-CM

## 2024-02-29 DIAGNOSIS — I25.10 CAD S/P PERCUTANEOUS CORONARY ANGIOPLASTY: ICD-10-CM

## 2024-02-29 DIAGNOSIS — R07.89 CHEST DISCOMFORT: Primary | ICD-10-CM

## 2024-02-29 DIAGNOSIS — E78.2 MIXED DYSLIPIDEMIA: ICD-10-CM

## 2024-02-29 RX ORDER — METOPROLOL SUCCINATE 25 MG/1
12.5 TABLET, EXTENDED RELEASE ORAL DAILY
Qty: 45 TABLET | Refills: 3 | Status: SHIPPED | OUTPATIENT
Start: 2024-02-29

## 2024-02-29 RX ORDER — ASPIRIN 81 MG/1
81 TABLET ORAL DAILY
Qty: 90 TABLET | Refills: 3 | Status: SHIPPED | OUTPATIENT
Start: 2024-02-29

## 2024-02-29 RX ORDER — NITROGLYCERIN 0.4 MG/1
0.4 TABLET SUBLINGUAL
Qty: 25 TABLET | Refills: 4 | Status: SHIPPED | OUTPATIENT
Start: 2024-02-29

## 2024-02-29 NOTE — PROGRESS NOTES
Chief Complaint  Coronary Artery Disease    Subjective      Patient returns clinic for follow-up on coronary artery disease.  He is status post remote PCI of the right coronary artery.  He has been having sporadic episodes of anterior chest discomfort exacerbated by physical activities.  His symptoms started a while back and have been stable in pattern.  He has chronic dyspnea on mild effort which has been stable as well.  He has history of lung cancer, status post previous surgical resection.  The patient continues to smoke but has cut back to less than half a pack per day.  He has no palpitations, dizziness, presyncope or syncope.    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 Rinse mouth out after each use, Disp: 1 each, Rfl: 11    DULoxetine (CYMBALTA) 30 MG capsule, Take 1 capsule by mouth every night at bedtime., Disp: 90 capsule, Rfl: 1    hydrOXYzine (ATARAX) 50 MG tablet, Take 1 tablet by mouth 3 (Three) Times a Day As Needed for Anxiety., Disp: 90 tablet, Rfl: 1    ipratropium-albuterol (DUO-NEB) 0.5-2.5 mg/3 ml nebulizer, Take 3 mL by nebulization Every 4 (Four) Hours As Needed for Wheezing or Shortness of Air for up to 30 days., Disp: 360 mL, Rfl: 11    nitroglycerin (Nitrostat) 0.4 MG SL tablet, Place 1 tablet under the tongue Every 5 (Five) Minutes As Needed for Chest Pain. Take no more than 3 doses in 15 minutes., Disp: 25 tablet, Rfl: 4    omeprazole (priLOSEC) 40 MG capsule, Take 1 capsule by mouth Every Morning Before Breakfast., Disp: 90 capsule,  Rfl: 0    ondansetron (ZOFRAN) 8 MG tablet, TAKE ONE TABLET BY MOUTH EVERY 8 HOURS AS NEEDED FOR FOR NAUSEA, Disp: 120 tablet, Rfl: 1    prazosin (MINIPRESS) 2 MG capsule, , Disp: , Rfl:     prochlorperazine (COMPAZINE) 10 MG tablet, , Disp: , Rfl:     promethazine (PHENERGAN) 25 MG tablet, , Disp: , Rfl:     rosuvastatin (CRESTOR) 40 MG tablet, Take 1 tablet by mouth every night at bedtime., Disp: 90 tablet, Rfl: 1    venlafaxine XR (EFFEXOR-XR) 37.5 MG 24 hr capsule, , Disp: , Rfl:     Ventolin  (90 Base) MCG/ACT inhaler, Inhale 2 puffs Every 4 (Four) Hours As Needed for Wheezing or Shortness of Air for up to 30 days., Disp: 18 g, Rfl: 11    aspirin 81 MG EC tablet, Take 1 tablet by mouth Daily., Disp: 90 tablet, Rfl: 3    methylPREDNISolone (MEDROL) 4 MG dose pack, Take as directed on package instructions. (Patient not taking: Reported on 2/14/2024), Disp: 21 tablet, Rfl: 0    metoprolol succinate XL (TOPROL-XL) 25 MG 24 hr tablet, Take 0.5 tablets by mouth Daily., Disp: 45 tablet, Rfl: 3    tiotropium bromide monohydrate (SPIRIVA RESPIMAT) 2.5 MCG/ACT aerosol solution inhaler, Inhale 2 puffs Daily for 30 days. (Patient not taking: Reported on 2/14/2024), Disp: 1 each, Rfl: 11    Medications Discontinued During This Encounter   Medication Reason    aspirin 81 MG EC tablet *Therapy completed    ezetimibe (ZETIA) 10 MG tablet *Therapy completed    famotidine (PEPCID) 20 MG tablet *Therapy completed    gabapentin (NEURONTIN) 800 MG tablet *Therapy completed    HYDROcodone-acetaminophen (NORCO)  MG per tablet *Therapy completed    lisinopril (PRINIVIL,ZESTRIL) 10 MG tablet *Therapy completed    metoprolol succinate XL (TOPROL-XL) 25 MG 24 hr tablet *Therapy completed    naproxen (NAPROSYN) 500 MG tablet *Therapy completed    nicotine (NICODERM CQ) 21 MG/24HR patch *Therapy completed    QUEtiapine (SEROquel) 300 MG tablet *Therapy completed    nitroglycerin (Nitrostat) 0.4 MG SL tablet Reorder     No  "Known Allergies     Social History     Tobacco Use    Smoking status: Every Day     Packs/day: 0.50     Years: 40.00     Additional pack years: 0.00     Total pack years: 20.00     Types: Cigarettes     Start date: 1984    Smokeless tobacco: Former     Types: Chew    Tobacco comments:     1PPD CURRENTLY   Vaping Use    Vaping Use: Never used   Substance Use Topics    Alcohol use: Not Currently    Drug use: Not Currently     Comment: PAIN KILLERS       Family History   Adopted: Yes   Problem Relation Age of Onset    No Known Problems Mother     No Known Problems Father     No Known Problems Sister     No Known Problems Brother     No Known Problems Maternal Grandmother     No Known Problems Maternal Grandfather     No Known Problems Paternal Grandmother     No Known Problems Paternal Grandfather     No Known Problems Other         Objective     /66   Pulse 78   Ht 182.9 cm (72.01\")   Wt 83.3 kg (183 lb 9.6 oz)   BMI 24.90 kg/m²       Physical Exam    General Appearance:   no acute distress  Alert and oriented x3  HENT:   lips not cyanotic  Atraumatic  Neck:  No jvd   supple  Respiratory:  no respiratory distress  normal breath sounds  no rales  Cardiovascular:  no S3, no S4   no murmur  no rub  Extremities  No cyanosis  lower extremity edema: none    Skin:   warm, dry  No rashes      Result Review :     proBNP   Date Value Ref Range Status   02/18/2023 <36.0 0.0 - 900.0 pg/mL Final          Lab Results   Component Value Date    TSH 1.630 12/13/2019      Lab Results   Component Value Date    FREET4 0.9 12/13/2019      No results found for: \"DDIMERQUANT\"  Magnesium   Date Value Ref Range Status   12/30/2021 1.9 1.6 - 2.6 mg/dL Final      No results found for: \"DIGOXIN\"   Lab Results   Component Value Date    TROPONINT 10 02/18/2023             Lab Results   Component Value Date    POCTROP 0.01 02/03/2021       Results for orders placed in visit on 07/23/21    Adult Transthoracic Echo Complete w/ Color, " Spectral and Contrast if necessary per protocol    Interpretation Summary  · Normal left ventricle systolic function with a calculated LV ejection fraction of 58%.  · Left ventricular diastolic function was normal.  · There are no hemodynamically significant valvular abnormalities.                 Diagnoses and all orders for this visit:    1. Chest discomfort (Primary)  -     Stress Test With Myocardial Perfusion One Day; Future  -     Adult Transthoracic Echo Complete W/ Cont if Necessary Per Protocol; Future    2. Dyspnea on exertion  -     Stress Test With Myocardial Perfusion One Day; Future  -     Adult Transthoracic Echo Complete W/ Cont if Necessary Per Protocol; Future    3. Mixed dyslipidemia    4. CAD S/P percutaneous coronary angioplasty    5. Smoking    Other orders  -     aspirin 81 MG EC tablet; Take 1 tablet by mouth Daily.  Dispense: 90 tablet; Refill: 3  -     nitroglycerin (Nitrostat) 0.4 MG SL tablet; Place 1 tablet under the tongue Every 5 (Five) Minutes As Needed for Chest Pain. Take no more than 3 doses in 15 minutes.  Dispense: 25 tablet; Refill: 4  -     metoprolol succinate XL (TOPROL-XL) 25 MG 24 hr tablet; Take 0.5 tablets by mouth Daily.  Dispense: 45 tablet; Refill: 3      Assessment:    Dyspnea on exertion/chest discomfort: The patient has dyspnea on mild effort which is chronic and more likely by smoking related lung disease.  He has sporadic episodes of chest discomfort which is exacerbated by physical activities.  He has known CAD, status post remote PCI of the right coronary artery.  He will be scheduled for Lexiscan stress test to assess for myocardial ischemia.  Echo will be done for LVEF, valvular function and PA systolic pressure.  He will be started on aspirin and metoprolol.  Continue atorvastatin therapy.  Further recommendations to follow.    Mixed dyslipidemia: On statin therapy.  Continue the same.  Most recent lipid profile was noted and was unremarkable.    Smoking: The  importance of smoking cessation was discussed with the patient.  He expressed understanding and willingness to quit.  He was encouraged.      Follow Up     Return for Return to clinic after diagnostic testing, With Michelle AVERY.        Patient was given instructions and counseling regarding his condition or for health maintenance advice. Please see specific information pulled into the AVS if appropriate.

## 2024-03-19 ENCOUNTER — HOSPITAL ENCOUNTER (OUTPATIENT)
Dept: GENERAL RADIOLOGY | Facility: HOSPITAL | Age: 59
Discharge: HOME | End: 2024-03-19
Admitting: INTERNAL MEDICINE
Payer: MEDICAID

## 2024-03-19 ENCOUNTER — OFFICE VISIT (OUTPATIENT)
Dept: PULMONOLOGY | Facility: CLINIC | Age: 59
End: 2024-03-19
Payer: MEDICAID

## 2024-03-19 VITALS
BODY MASS INDEX: 23.09 KG/M2 | RESPIRATION RATE: 18 BRPM | OXYGEN SATURATION: 100 % | HEART RATE: 77 BPM | DIASTOLIC BLOOD PRESSURE: 92 MMHG | HEIGHT: 72 IN | SYSTOLIC BLOOD PRESSURE: 132 MMHG | WEIGHT: 170.5 LBS

## 2024-03-19 DIAGNOSIS — J44.9 CHRONIC OBSTRUCTIVE PULMONARY DISEASE, UNSPECIFIED COPD TYPE: ICD-10-CM

## 2024-03-19 DIAGNOSIS — R59.0 MEDIASTINAL ADENOPATHY: ICD-10-CM

## 2024-03-19 DIAGNOSIS — J18.9 MULTIFOCAL PNEUMONIA: ICD-10-CM

## 2024-03-19 DIAGNOSIS — C34.31 SQUAMOUS CELL CARCINOMA OF BRONCHUS IN RIGHT LOWER LOBE: ICD-10-CM

## 2024-03-19 DIAGNOSIS — C34.31 SQUAMOUS CELL CARCINOMA OF BRONCHUS IN RIGHT LOWER LOBE: Primary | ICD-10-CM

## 2024-03-19 PROCEDURE — 1159F MED LIST DOCD IN RCRD: CPT | Performed by: INTERNAL MEDICINE

## 2024-03-19 PROCEDURE — 1160F RVW MEDS BY RX/DR IN RCRD: CPT | Performed by: INTERNAL MEDICINE

## 2024-03-19 PROCEDURE — 3080F DIAST BP >= 90 MM HG: CPT | Performed by: INTERNAL MEDICINE

## 2024-03-19 PROCEDURE — 71046 X-RAY EXAM CHEST 2 VIEWS: CPT

## 2024-03-19 PROCEDURE — 3075F SYST BP GE 130 - 139MM HG: CPT | Performed by: INTERNAL MEDICINE

## 2024-03-19 PROCEDURE — 90677 PCV20 VACCINE IM: CPT | Performed by: INTERNAL MEDICINE

## 2024-03-19 PROCEDURE — 90471 IMMUNIZATION ADMIN: CPT | Performed by: INTERNAL MEDICINE

## 2024-03-19 PROCEDURE — 99214 OFFICE O/P EST MOD 30 MIN: CPT | Performed by: INTERNAL MEDICINE

## 2024-03-19 RX ORDER — BUPROPION HYDROCHLORIDE 150 MG/1
150 TABLET, EXTENDED RELEASE ORAL 2 TIMES DAILY
Qty: 60 TABLET | Refills: 3 | Status: SHIPPED | OUTPATIENT
Start: 2024-03-19

## 2024-03-19 NOTE — PROGRESS NOTES
Primary Care Provider  Kaylah Goddard APRN     Referring Provider  No ref. 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   He is currently on Breztri inhaler twice daily and albuterol as needed.  He also has nebulizer machine at home which he uses 2-3 times a day.  He completed a course of doxycycline after he was seen by BENNIE Monson.  He has not had repeat chest x-ray afterwards.  He saw Dr. King last month, and has repeat CT scan of the chest ordered for August 2024.  He continues to smoke 7 cigarettes a day.  He is on oxygen at 2 L/min around-the-clock.  He has no nausea or vomiting.  No change in weight or appetite.  No leg swelling.  He has tried nicotine patch as well as Chantix in the past.  He is willing to try Wellbutrin.    Review of Systems     Family History   Adopted: Yes   Problem Relation Age of Onset    No Known Problems Mother     No Known Problems Father     No Known Problems Sister     No Known Problems Brother     No Known Problems Maternal Grandmother     No Known Problems Maternal Grandfather     No Known Problems Paternal Grandmother     No Known Problems Paternal Grandfather     No Known Problems Other         Social History     Socioeconomic History    Marital status: Other   Tobacco Use    Smoking status: Every Day     Current packs/day: 0.50     Average packs/day: 0.5 packs/day for 40.2 years (20.1 ttl pk-yrs)     Types: Cigarettes     Start date: 1984     Passive exposure: Current    Smokeless tobacco: Former     Types: Chew    Tobacco comments:     1PPD CURRENTLY   Vaping Use    Vaping status: Never Used   Substance and Sexual Activity    Alcohol use: Not Currently     Drug use: Not Currently     Comment: PAIN KILLERS    Sexual activity: Defer        Past Medical History:   Diagnosis Date    Acid reflux disease     Anxiety     Arthritis     COPD (chronic obstructive pulmonary disease)     Depression     Heart attack     2 STNTS    Lung cancer     PTSD (post-traumatic stress disorder)     SOB (shortness of breath)     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 6/7/2021        Immunization History   Administered Date(s) Administered    COVID-19 (PFIZER) Purple Cap Monovalent 04/13/2021, 04/13/2021, 05/04/2021, 05/04/2021, 11/10/2021    Flu Vaccine Quad PF >36MO 10/29/2019, 11/17/2020    Flu Vaccine Split Quad 10/29/2019, 11/17/2020    Fluzone (or Fluarix & Flulaval for VFC) >6mos 10/29/2019, 11/17/2020, 10/13/2022    Fluzone Quad >6mos (Multi-dose) 10/30/2018, 09/30/2019    Influenza, Unspecified 12/01/2020    Pneumococcal Polysaccharide (PPSV23) 01/08/2018       No Known Allergies       Current Outpatient Medications:     aspirin 81 MG EC tablet, Take 1 tablet by mouth Daily., Disp: 90 tablet, Rfl: 3    Budeson-Glycopyrrol-Formoterol (Breztri Aerosphere) 160-9-4.8 MCG/ACT aerosol inhaler, Inhale 2 puffs 2 (Two) Times a Day. Rinse mouth out after each use, Disp: 1 each, Rfl: 11    DULoxetine (CYMBALTA) 30 MG capsule, Take 1 capsule by mouth every night at bedtime., Disp: 90 capsule, Rfl: 1    hydrOXYzine (ATARAX) 50 MG tablet, Take 1 tablet by mouth 3 (Three) Times a Day As Needed for Anxiety., Disp: 90 tablet, Rfl: 1    ipratropium-albuterol (DUO-NEB) 0.5-2.5 mg/3 ml nebulizer, Take 3 mL by nebulization Every 4 (Four) Hours As Needed for Wheezing or Shortness of Air for up to 30 days., Disp: 360 mL, Rfl: 11    metoprolol succinate XL (TOPROL-XL) 25 MG 24 hr tablet, Take 0.5 tablets by mouth Daily., Disp: 45 tablet, Rfl: 3    nitroglycerin (Nitrostat) 0.4 MG SL tablet, Place 1 tablet under the tongue Every 5 (Five) Minutes As Needed for Chest Pain. Take no more than 3  "doses in 15 minutes., Disp: 25 tablet, Rfl: 4    omeprazole (priLOSEC) 40 MG capsule, Take 1 capsule by mouth Every Morning Before Breakfast., Disp: 90 capsule, Rfl: 0    ondansetron (ZOFRAN) 8 MG tablet, TAKE ONE TABLET BY MOUTH EVERY 8 HOURS AS NEEDED FOR FOR NAUSEA, Disp: 120 tablet, Rfl: 1    prazosin (MINIPRESS) 2 MG capsule, , Disp: , Rfl:     prochlorperazine (COMPAZINE) 10 MG tablet, , Disp: , Rfl:     promethazine (PHENERGAN) 25 MG tablet, , Disp: , Rfl:     rosuvastatin (CRESTOR) 40 MG tablet, Take 1 tablet by mouth every night at bedtime., Disp: 90 tablet, Rfl: 1    venlafaxine XR (EFFEXOR-XR) 37.5 MG 24 hr capsule, , Disp: , Rfl:     buPROPion SR (Wellbutrin SR) 150 MG 12 hr tablet, Take 1 tablet by mouth 2 (Two) Times a Day., Disp: 60 tablet, Rfl: 3     Objective     Physical Exam  Vital Signs:   WDWN, Alert, in mild distress, has conversational dyspnea, nasal oxygen  HEENT:  PERRL, EOMI.  OP, nares clear, no sinus tenderness  Neck:  Supple, no JVD, no thyromegaly.  Lymph: no axillary, cervical, supraclavicular lymphadenopathy noted bilaterally  Chest: decreased breath sounds bilaterally. No wheezes, rales, or rhonchi appreciated.  Normal work of breathing noted.  Patient is able speak full sentences without difficulty.   CV: RRR, no MGR, pulses 2+, equal.  Abd:  Soft, NT, ND, + BS, no HSM  EXT:  no clubbing, no cyanosis, no edema, no joint tenderness  Neuro:  A&Ox3, CN grossly intact, no focal deficits.  Skin: No rashes or lesions noted.    /92 (BP Location: Left arm, Patient Position: Sitting, Cuff Size: Adult)   Pulse 77   Resp 18   Ht 182.9 cm (72\")   Wt 77.3 kg (170 lb 8 oz)   SpO2 100% Comment: 2L  BMI 23.12 kg/m²         Result Review :   I have reviewed my last office visit note.  I also reviewed chest CT report dated from 1/30/2024.  I also reviewed PET/CT report ordered by oncology dated from 2/12/2024.  See scanned reports.    Procedures:      CT Chest With Contrast " Diagnostic    Result Date: 1/30/2024     1. No acute pulmonary abnormality.  There has been interval resolution of previously demonstrated pneumonia and reactive mediastinal and hilar adenopathy  2. Stable nodules in the right upper lobe, lingula, and left lower lobe  3. Emphysema  4. Status post right middle and right lower lobectomies     NOHEMY PENA MD       Electronically Signed and Approved By: NOHEMY PENA MD on 1/30/2024 at 14:19             PET scan from February 2024 showed mild consolidative opacity right chest.        Assessment and Plan      Assessment:  1.  COPD without acute exacerbation.  Patient is on triple inhaler therapy.   2.  Multifocal pneumonia on PET CT scan completed on 2/12/2024.  3.  3.8 cm right hilar mass, positive for squamous cell carcinoma with VATS exploration with posterior lateral thoracotomy and lower and middle bilobectomy. Patient care Dr. Calvin King.    4.  History of streptococcuspneumoniae and streptococcus agalacticae group B on bronch. Pt treated.  5.  History of lung nodule.   6.  Abnormal PET scan.    7.  Mediastinal lymphadenopathy.  8.  Hilar lymphadenopathy.  9. Tobacco abuse with cigarettes, ongoing.     10. Encounter for immunization: Flu vaccine given to the patient in the office today.           Plan:  He completed doxycycline for a week.   Repeat chest x-ray now.  Continue follow-up with Dr. Simpson and Dr. King.  Patient will be having a follow-up chest CT scan in August 2024 that has been ordered by Dr. King.    Continue with Breztri inhaler 2 puffs twice daily.  Continue with DuoNeb nebulizer 2-4 times a day to help with airway clearance.  Continue oxygen at 2 L/min with rest activities and sleep to keep SPO2 at 89% and above.    Vaccination status: He had Pneumovax 23 in 2018.  We will administer pneumococcal 20 vaccine today.  He is up-to-date on flu vaccine.    Smoking status: patient is a current cigarette smoker.  I spent counseled the  patient on smoking cessation.  I counseled the patient on the risks of continued smoking including the risk of lung cancer, head and neck cancer, renal cancer, heart disease, stroke, and early death.  Patient refuses nicotine replacement therapy but is willing to try Wellbutrin. I have sent the prescription for Wellbutrin today..  Patient is advised to decrease the number of cigarettes they are smoking up until the point to where they can quit.      Follow Up   Return in about 6 months (around 9/19/2024).  Patient was given instructions and counseling regarding his condition or for health maintenance advice. Please see specific information pulled into the AVS if appropriate.

## 2024-05-13 RX ORDER — BUDESONIDE AND FORMOTEROL FUMARATE DIHYDRATE 160; 4.5 UG/1; UG/1
2 AEROSOL RESPIRATORY (INHALATION) 2 TIMES DAILY
Qty: 10.2 G | OUTPATIENT
Start: 2024-05-13

## 2024-06-29 DIAGNOSIS — G62.9 NEUROPATHY: ICD-10-CM

## 2024-07-01 RX ORDER — BUDESONIDE, GLYCOPYRROLATE, AND FORMOTEROL FUMARATE 160; 9; 4.8 UG/1; UG/1; UG/1
2 AEROSOL, METERED RESPIRATORY (INHALATION) 2 TIMES DAILY
Qty: 10.7 G | OUTPATIENT
Start: 2024-07-01

## 2024-07-01 RX ORDER — ASPIRIN 81 MG/1
81 TABLET, COATED ORAL DAILY
Qty: 90 TABLET | Refills: 3 | OUTPATIENT
Start: 2024-07-01

## 2024-07-01 RX ORDER — DULOXETIN HYDROCHLORIDE 30 MG/1
30 CAPSULE, DELAYED RELEASE ORAL
Qty: 90 CAPSULE | Refills: 1 | Status: SHIPPED | OUTPATIENT
Start: 2024-07-01

## 2024-07-01 RX ORDER — IPRATROPIUM BROMIDE AND ALBUTEROL SULFATE 2.5; .5 MG/3ML; MG/3ML
SOLUTION RESPIRATORY (INHALATION)
Qty: 360 ML | Refills: 11 | OUTPATIENT
Start: 2024-07-01

## 2024-07-02 ENCOUNTER — TELEPHONE (OUTPATIENT)
Dept: PULMONOLOGY | Facility: CLINIC | Age: 59
End: 2024-07-02

## 2024-07-02 NOTE — TELEPHONE ENCOUNTER
Caller: STEPHANE MCKEON     Relationship to Patient: SELF    Phone Number: [phone]     Reason for Call: THE PT NEEDS A NEW PRESCRIPTION FOR HIS OXYGEN. PLEASE ADVISE

## 2024-07-05 NOTE — TELEPHONE ENCOUNTER
Per Sole with [employer], pt [facility]  Pt will have to be requalified.  I called pt to get him set up with an appt.  He said he wasn't worried about it and hung up.

## 2024-09-23 RX ORDER — BUDESONIDE AND FORMOTEROL FUMARATE DIHYDRATE 160; 4.5 UG/1; UG/1
2 AEROSOL RESPIRATORY (INHALATION) 2 TIMES DAILY
Qty: 10.2 G | OUTPATIENT
Start: 2024-09-23

## 2024-09-23 RX ORDER — ALBUTEROL SULFATE 90 UG/1
2 AEROSOL, METERED RESPIRATORY (INHALATION) EVERY 4 HOURS PRN
Qty: 18 G | Refills: 11 | Status: SHIPPED | OUTPATIENT
Start: 2024-09-23

## 2024-11-12 RX ORDER — PROCHLORPERAZINE MALEATE 10 MG
10 TABLET ORAL EVERY 6 HOURS PRN
Qty: 28 TABLET | OUTPATIENT
Start: 2024-11-12

## 2024-11-12 RX ORDER — DOXYCYCLINE 100 MG/1
100 CAPSULE ORAL 2 TIMES DAILY
Qty: 14 CAPSULE | Refills: 0 | OUTPATIENT
Start: 2024-11-12

## 2024-12-04 ENCOUNTER — TELEPHONE (OUTPATIENT)
Dept: CARDIOLOGY | Facility: CLINIC | Age: 59
End: 2024-12-04
Payer: MEDICAID

## 2024-12-04 RX ORDER — NITROGLYCERIN 0.4 MG/1
0.4 TABLET SUBLINGUAL
Qty: 25 TABLET | Refills: 1 | Status: SHIPPED | OUTPATIENT
Start: 2024-12-04

## 2024-12-04 NOTE — TELEPHONE ENCOUNTER
[facility] received a fax that requires your attention. The document has been indexed to the patient’s chart for your review.      Reason for sending: EXTERNAL MEDICAL RECORD NOTIFICATION     Documents Description: NITRO TLDOSB-[id number]    Name of Sender: MARKO     Date Indexed: 11.29.24

## 2024-12-04 NOTE — TELEPHONE ENCOUNTER
Rx Refill Note  Requested Prescriptions     Pending Prescriptions Disp Refills    nitroglycerin (Nitrostat) 0.4 MG SL tablet 25 tablet 4     Sig: Place 1 tablet under the tongue Every 5 (Five) Minutes As Needed for Chest Pain. Take no more than 3 doses in 15 minutes.        LAST OFFICE VISIT:  2/29/2024     NEXT OFFICE VISIT:  NO F/U SCHEDULED    Does the medication requests match the last office note:    [x] Yes   [] No    Does this refill request meet protocol details for MA to approve:     [x] Yes   [] No

## 2024-12-17 DIAGNOSIS — R11.0 NAUSEA: ICD-10-CM

## 2024-12-17 RX ORDER — IPRATROPIUM BROMIDE AND ALBUTEROL SULFATE 2.5; .5 MG/3ML; MG/3ML
SOLUTION RESPIRATORY (INHALATION)
Qty: 360 ML | Refills: 11 | Status: SHIPPED | OUTPATIENT
Start: 2024-12-17

## 2024-12-17 RX ORDER — ONDANSETRON 8 MG/1
8 TABLET, FILM COATED ORAL EVERY 8 HOURS PRN
Qty: 120 TABLET | Refills: 1 | Status: SHIPPED | OUTPATIENT
Start: 2024-12-17

## 2024-12-17 RX ORDER — ALBUTEROL SULFATE 90 UG/1
2 AEROSOL, METERED RESPIRATORY (INHALATION) EVERY 4 HOURS PRN
Qty: 18 G | Refills: 11 | OUTPATIENT
Start: 2024-12-17

## 2025-01-08 ENCOUNTER — TELEPHONE (OUTPATIENT)
Dept: ONCOLOGY | Facility: HOSPITAL | Age: 60
End: 2025-01-08
Payer: MEDICAID

## 2025-01-08 NOTE — TELEPHONE ENCOUNTER
LEFT PATIENT DETAILED VM LETTING HIM KNOW THAT WE HAD TO CANCEL HIS FOLLOW UP ON 1/13/25 WITH DR. CAMACHO SINCE PATIENT NO SHOWED HIS CT SCANS TODAY. LET PATIENT KNOW TO CALL OFFICE IF HE WOULD LIKE TO RESCHEDULE AND THAT IF HE IS HAVING BARRIERS TO CARE REGARDING TRANSPORTATION AND WOULD LIKE ASSISTANCE WITH THIS TO LET US KNOW.

## 2025-08-15 PROCEDURE — 99283 EMERGENCY DEPT VISIT LOW MDM: CPT

## 2025-08-16 ENCOUNTER — HOSPITAL ENCOUNTER (EMERGENCY)
Facility: HOSPITAL | Age: 60
Discharge: HOME | End: 2025-08-16
Attending: EMERGENCY MEDICINE
Payer: MEDICAID

## 2025-08-16 VITALS
SYSTOLIC BLOOD PRESSURE: 144 MMHG | HEIGHT: 72 IN | BODY MASS INDEX: 21.54 KG/M2 | TEMPERATURE: 98.3 F | WEIGHT: 159 LBS | DIASTOLIC BLOOD PRESSURE: 98 MMHG | HEART RATE: 95 BPM | RESPIRATION RATE: 20 BRPM | OXYGEN SATURATION: 95 %

## 2025-08-16 DIAGNOSIS — R21 SKIN RASH: ICD-10-CM

## 2025-08-16 DIAGNOSIS — L29.9 PRURITUS: Primary | ICD-10-CM

## 2025-08-16 RX ORDER — HYDROXYZINE HYDROCHLORIDE 25 MG/1
25 TABLET, FILM COATED ORAL ONCE
Status: COMPLETED | OUTPATIENT
Start: 2025-08-16 | End: 2025-08-16

## 2025-08-16 RX ORDER — PERMETHRIN 50 MG/G
1 CREAM TOPICAL ONCE
Qty: 1 G | Refills: 0 | Status: SHIPPED | OUTPATIENT
Start: 2025-08-16 | End: 2025-08-16

## 2025-08-16 RX ORDER — HYDROXYZINE HYDROCHLORIDE 25 MG/1
25 TABLET, FILM COATED ORAL 3 TIMES DAILY PRN
Qty: 20 TABLET | Refills: 0 | Status: SHIPPED | OUTPATIENT
Start: 2025-08-16 | End: 2025-08-16

## 2025-08-16 RX ORDER — HYDROXYZINE HYDROCHLORIDE 25 MG/1
25 TABLET, FILM COATED ORAL 3 TIMES DAILY PRN
Qty: 20 TABLET | Refills: 0 | Status: SHIPPED | OUTPATIENT
Start: 2025-08-16

## 2025-08-16 RX ADMIN — HYDROXYZINE HYDROCHLORIDE 25 MG: 25 TABLET, FILM COATED ORAL at 01:26

## 2025-08-18 ENCOUNTER — HOSPITAL ENCOUNTER (EMERGENCY)
Facility: HOSPITAL | Age: 60
Discharge: HOME | End: 2025-08-18
Attending: EMERGENCY MEDICINE | Admitting: EMERGENCY MEDICINE
Payer: MEDICAID

## 2025-08-18 VITALS
HEART RATE: 88 BPM | BODY MASS INDEX: 22.13 KG/M2 | WEIGHT: 163.36 LBS | HEIGHT: 72 IN | DIASTOLIC BLOOD PRESSURE: 92 MMHG | RESPIRATION RATE: 20 BRPM | TEMPERATURE: 98.1 F | OXYGEN SATURATION: 100 % | SYSTOLIC BLOOD PRESSURE: 129 MMHG

## 2025-08-18 DIAGNOSIS — B86 SCABIES: Primary | ICD-10-CM

## 2025-08-18 PROCEDURE — 63710000001 PREDNISONE PER 5 MG

## 2025-08-18 PROCEDURE — 99283 EMERGENCY DEPT VISIT LOW MDM: CPT

## 2025-08-18 RX ORDER — IVERMECTIN 3 MG/1
200 TABLET ORAL ONCE
Qty: 5 TABLET | Refills: 0 | Status: SHIPPED | OUTPATIENT
Start: 2025-08-18 | End: 2025-08-18

## 2025-08-18 RX ORDER — DIAPER,BRIEF,INFANT-TODD,DISP
1 EACH MISCELLANEOUS 2 TIMES DAILY
Qty: 56 G | Refills: 0 | Status: SHIPPED | OUTPATIENT
Start: 2025-08-18

## 2025-08-18 RX ORDER — PREDNISONE 10 MG/1
20 TABLET ORAL ONCE
Status: COMPLETED | OUTPATIENT
Start: 2025-08-18 | End: 2025-08-18

## 2025-08-18 RX ADMIN — PREDNISONE 20 MG: 10 TABLET ORAL at 22:33

## 2025-08-23 ENCOUNTER — HOSPITAL ENCOUNTER (EMERGENCY)
Facility: HOSPITAL | Age: 60
Discharge: HOME | End: 2025-08-24
Attending: EMERGENCY MEDICINE
Payer: MEDICAID